# Patient Record
Sex: MALE | Race: WHITE | Employment: OTHER | URBAN - NONMETROPOLITAN AREA
[De-identification: names, ages, dates, MRNs, and addresses within clinical notes are randomized per-mention and may not be internally consistent; named-entity substitution may affect disease eponyms.]

---

## 2017-02-06 DIAGNOSIS — E78.5 HYPERLIPIDEMIA: Primary | ICD-10-CM

## 2017-02-06 RX ORDER — SIMVASTATIN 20 MG
TABLET ORAL
Qty: 30 TABLET | Refills: 0 | Status: SHIPPED | OUTPATIENT
Start: 2017-02-06 | End: 2017-02-09 | Stop reason: DRUGHIGH

## 2017-02-09 ENCOUNTER — OFFICE VISIT (OUTPATIENT)
Dept: CARDIOLOGY | Age: 72
End: 2017-02-09
Payer: MEDICARE

## 2017-02-09 VITALS
WEIGHT: 171 LBS | HEIGHT: 71 IN | DIASTOLIC BLOOD PRESSURE: 70 MMHG | SYSTOLIC BLOOD PRESSURE: 118 MMHG | BODY MASS INDEX: 23.94 KG/M2 | HEART RATE: 60 BPM

## 2017-02-09 DIAGNOSIS — Z95.810 AICD (AUTOMATIC CARDIOVERTER/DEFIBRILLATOR) PRESENT: ICD-10-CM

## 2017-02-09 DIAGNOSIS — I10 ESSENTIAL HYPERTENSION: ICD-10-CM

## 2017-02-09 DIAGNOSIS — I25.5 ISCHEMIC CARDIOMYOPATHY: Primary | ICD-10-CM

## 2017-02-09 DIAGNOSIS — I25.10 CORONARY ARTERY DISEASE INVOLVING NATIVE CORONARY ARTERY OF NATIVE HEART WITHOUT ANGINA PECTORIS: ICD-10-CM

## 2017-02-09 PROCEDURE — 3017F COLORECTAL CA SCREEN DOC REV: CPT | Performed by: CLINICAL NURSE SPECIALIST

## 2017-02-09 PROCEDURE — 93289 INTERROG DEVICE EVAL HEART: CPT | Performed by: CLINICAL NURSE SPECIALIST

## 2017-02-09 PROCEDURE — 1036F TOBACCO NON-USER: CPT | Performed by: CLINICAL NURSE SPECIALIST

## 2017-02-09 PROCEDURE — 99213 OFFICE O/P EST LOW 20 MIN: CPT | Performed by: CLINICAL NURSE SPECIALIST

## 2017-02-09 PROCEDURE — G8420 CALC BMI NORM PARAMETERS: HCPCS | Performed by: CLINICAL NURSE SPECIALIST

## 2017-02-09 PROCEDURE — 1123F ACP DISCUSS/DSCN MKR DOCD: CPT | Performed by: CLINICAL NURSE SPECIALIST

## 2017-02-09 PROCEDURE — G8484 FLU IMMUNIZE NO ADMIN: HCPCS | Performed by: CLINICAL NURSE SPECIALIST

## 2017-02-09 PROCEDURE — G8427 DOCREV CUR MEDS BY ELIG CLIN: HCPCS | Performed by: CLINICAL NURSE SPECIALIST

## 2017-02-09 PROCEDURE — G8598 ASA/ANTIPLAT THER USED: HCPCS | Performed by: CLINICAL NURSE SPECIALIST

## 2017-02-09 PROCEDURE — 4040F PNEUMOC VAC/ADMIN/RCVD: CPT | Performed by: CLINICAL NURSE SPECIALIST

## 2017-02-09 RX ORDER — SIMVASTATIN 10 MG
TABLET ORAL
COMMUNITY
Start: 2017-01-04 | End: 2018-02-09 | Stop reason: SDUPTHER

## 2017-05-11 ENCOUNTER — TELEPHONE (OUTPATIENT)
Dept: CARDIOLOGY | Age: 72
End: 2017-05-11

## 2017-05-11 DIAGNOSIS — I25.5 ISCHEMIC CARDIOMYOPATHY: ICD-10-CM

## 2017-05-11 DIAGNOSIS — Z95.810 AICD (AUTOMATIC CARDIOVERTER/DEFIBRILLATOR) PRESENT: Primary | ICD-10-CM

## 2017-05-11 PROCEDURE — 93296 REM INTERROG EVL PM/IDS: CPT | Performed by: CLINICAL NURSE SPECIALIST

## 2017-05-11 PROCEDURE — 93295 DEV INTERROG REMOTE 1/2/MLT: CPT | Performed by: CLINICAL NURSE SPECIALIST

## 2017-08-16 ENCOUNTER — OFFICE VISIT (OUTPATIENT)
Dept: CARDIOLOGY | Age: 72
End: 2017-08-16
Payer: MEDICARE

## 2017-08-16 VITALS
SYSTOLIC BLOOD PRESSURE: 118 MMHG | WEIGHT: 167 LBS | HEART RATE: 73 BPM | HEIGHT: 70 IN | BODY MASS INDEX: 23.91 KG/M2 | DIASTOLIC BLOOD PRESSURE: 70 MMHG

## 2017-08-16 DIAGNOSIS — I25.5 ISCHEMIC CARDIOMYOPATHY: ICD-10-CM

## 2017-08-16 DIAGNOSIS — Z95.810 AICD (AUTOMATIC CARDIOVERTER/DEFIBRILLATOR) PRESENT: ICD-10-CM

## 2017-08-16 PROCEDURE — 99214 OFFICE O/P EST MOD 30 MIN: CPT | Performed by: INTERNAL MEDICINE

## 2017-08-16 PROCEDURE — 3017F COLORECTAL CA SCREEN DOC REV: CPT | Performed by: INTERNAL MEDICINE

## 2017-08-16 PROCEDURE — 93282 PRGRMG EVAL IMPLANTABLE DFB: CPT | Performed by: INTERNAL MEDICINE

## 2017-08-16 PROCEDURE — G8420 CALC BMI NORM PARAMETERS: HCPCS | Performed by: INTERNAL MEDICINE

## 2017-08-16 PROCEDURE — 1123F ACP DISCUSS/DSCN MKR DOCD: CPT | Performed by: INTERNAL MEDICINE

## 2017-08-16 PROCEDURE — 1036F TOBACCO NON-USER: CPT | Performed by: INTERNAL MEDICINE

## 2017-08-16 PROCEDURE — G8427 DOCREV CUR MEDS BY ELIG CLIN: HCPCS | Performed by: INTERNAL MEDICINE

## 2017-08-16 PROCEDURE — G8598 ASA/ANTIPLAT THER USED: HCPCS | Performed by: INTERNAL MEDICINE

## 2017-08-16 PROCEDURE — 4040F PNEUMOC VAC/ADMIN/RCVD: CPT | Performed by: INTERNAL MEDICINE

## 2017-08-16 RX ORDER — CARVEDILOL 6.25 MG/1
3.12 TABLET ORAL 2 TIMES DAILY WITH MEALS
COMMUNITY
End: 2018-04-19 | Stop reason: SDUPTHER

## 2017-09-25 ENCOUNTER — OFFICE VISIT (OUTPATIENT)
Dept: GASTROENTEROLOGY | Facility: CLINIC | Age: 72
End: 2017-09-25

## 2017-09-25 VITALS
HEART RATE: 60 BPM | OXYGEN SATURATION: 98 % | TEMPERATURE: 96 F | BODY MASS INDEX: 23.38 KG/M2 | WEIGHT: 167 LBS | DIASTOLIC BLOOD PRESSURE: 62 MMHG | SYSTOLIC BLOOD PRESSURE: 106 MMHG | HEIGHT: 71 IN

## 2017-09-25 DIAGNOSIS — Z80.0 FAMILY HX OF COLON CANCER: ICD-10-CM

## 2017-09-25 DIAGNOSIS — R12 HEARTBURN: ICD-10-CM

## 2017-09-25 DIAGNOSIS — R13.14 PHARYNGOESOPHAGEAL DYSPHAGIA: ICD-10-CM

## 2017-09-25 DIAGNOSIS — Z86.010 HX OF COLONIC POLYP: Primary | ICD-10-CM

## 2017-09-25 PROBLEM — Z86.0100 HX OF COLONIC POLYP: Status: ACTIVE | Noted: 2017-09-25

## 2017-09-25 PROCEDURE — 99214 OFFICE O/P EST MOD 30 MIN: CPT | Performed by: NURSE PRACTITIONER

## 2017-09-25 RX ORDER — ASPIRIN 81 MG/1
81 TABLET ORAL DAILY
COMMUNITY

## 2017-09-25 RX ORDER — ISOSORBIDE MONONITRATE 30 MG/1
30 TABLET, EXTENDED RELEASE ORAL DAILY
COMMUNITY
Start: 2016-06-21

## 2017-09-25 RX ORDER — CARVEDILOL 3.12 MG/1
3.12 TABLET ORAL 2 TIMES DAILY WITH MEALS
COMMUNITY

## 2017-09-25 RX ORDER — NITROGLYCERIN 0.4 MG/1
0.4 TABLET SUBLINGUAL
COMMUNITY
Start: 2015-06-24

## 2017-09-25 RX ORDER — SIMVASTATIN 10 MG
10 TABLET ORAL NIGHTLY
COMMUNITY
Start: 2017-01-04 | End: 2020-12-17

## 2017-09-25 NOTE — PROGRESS NOTES
[facility]    Primary Physician Josiah Martines MD    9/25/2017    Zackery Molina   1945      Chief Complaint   Patient presents with   • Colonoscopy   Difficulty swallowing  Reflux    Subjective     HPI    Zackery Molina is a 72 y.o. male who presents as a referral for preventative maintenance. He has no complaints of nausea or vomiting. No change in bowels. No wt loss. No BRBPR. No melena. No abdominal pain.   His father has history of colon polyps in his 70s.  Patient also has personal history of colon polyps.  His last colonoscopy was June 2012 noting mild diverticulosis and was recommended repeat colonoscopy again in 5 years.    He also has had intermittent dysphagia over the last year.  He points to the nape of the neck where he feels like solids or liquids will hang.  Coughing will usually help when these situations occur.  He does have some heartburn at least 2 days a week and is not taking prescription medications.  He has tried Tums or Rolaids over-the-counter.  No history of upper endoscopy.  No hematemesis.  No nausea or vomiting.  No fevers or chills.  No unintentional weight loss.  No abdominal pain.    Past Medical History:   Diagnosis Date   • CHF (congestive heart failure)     dr crow    • Colon polyp    • Dysuria    • GERD (gastroesophageal reflux disease)    • Hyperlipidemia    • Myocardial infarction    • Shingles        Past Surgical History:   Procedure Laterality Date   • APPENDECTOMY     • CHOLECYSTECTOMY     • COLONOSCOPY     • CORONARY ARTERY BYPASS GRAFT     • ENDOSCOPY  04/06/2016   • HIP SURGERY Left        Outpatient Prescriptions Marked as Taking for the 9/25/17 encounter (Office Visit) with MOODY Reese   Medication Sig Dispense Refill   • aspirin 81 MG EC tablet Take  by mouth.     • carvedilol (COREG) 6.25 MG tablet Take 3.125 mg by mouth 2 (Two) Times a Day With Meals.     • isosorbide mononitrate (IMDUR) 30 MG 24 hr tablet Take  by  mouth.     • nitroglycerin (NITROSTAT) 0.4 MG SL tablet Place  under the tongue.     • simvastatin (ZOCOR) 10 MG tablet Every Night.         No Known Allergies    Social History     Social History   • Marital status:      Spouse name: N/A   • Number of children: N/A   • Years of education: N/A     Occupational History   • Not on file.     Social History Main Topics   • Smoking status: Former Smoker   • Smokeless tobacco: Never Used   • Alcohol use No   • Drug use: No   • Sexual activity: Not on file     Other Topics Concern   • Not on file     Social History Narrative       Family History   Problem Relation Age of Onset   • Colon cancer Father        Review of Systems   Constitutional: Negative for appetite change, chills, fatigue, fever and unexpected weight change.   HENT: Positive for trouble swallowing. Negative for sore throat.    Respiratory: Negative for cough, chest tightness, shortness of breath and wheezing.    Cardiovascular: Negative for chest pain and palpitations.   Gastrointestinal: Negative for abdominal distention, abdominal pain, anal bleeding, blood in stool, constipation, diarrhea, nausea, rectal pain and vomiting.        As mentioned in hpi   Genitourinary: Negative for difficulty urinating, dysuria and hematuria.   Musculoskeletal: Negative for arthralgias and back pain.   Skin: Negative for color change and rash.   Neurological: Negative for dizziness, syncope, light-headedness and headaches.   Psychiatric/Behavioral: Negative for confusion. The patient is not nervous/anxious.        Objective     Vitals:    09/25/17 0913   BP: 106/62   Pulse: 60   Temp: 96 °F (35.6 °C)   SpO2: 98%     Last 2 weights    09/25/17 0913   Weight: 167 lb (75.8 kg)     Body mass index is 23.62 kg/(m^2).    Physical Exam   Constitutional: He appears well-developed and well-nourished. No distress.   HENT:   Head: Normocephalic and atraumatic.   Eyes: EOM are normal. No scleral icterus.   Neck: Neck supple. No  JVD present.   Cardiovascular: Normal rate, regular rhythm and normal heart sounds.    Pulmonary/Chest: Effort normal and breath sounds normal. No stridor.   Abdominal: Soft. Bowel sounds are normal. He exhibits no distension and no mass. There is no tenderness. There is no rebound and no guarding.   Musculoskeletal: He exhibits no edema.   Neurological: He is alert.   Skin: Skin is warm and dry. No rash noted.   Psychiatric: He has a normal mood and affect. His behavior is normal.   Vitals reviewed.      Imaging Results (most recent)     None          Assessment/Plan     Zackery was seen today for colonoscopy.    Diagnoses and all orders for this visit:    Hx of colonic polyp  -     Case Request; Standing  -     Case Request    Family hx of colon cancer  -     Case Request; Standing  -     Case Request    Pharyngoesophageal dysphagia  -     Case Request; Standing  -     Case Request    Heartburn    Other orders  -     Implement Anesthesia Orders Day of Procedure; Standing  -     Obtain Informed Consent; Standing  -     polyethylene glycol (GOLYTELY) 236 g solution; Take 4,000 mL by mouth 1 (One) Time for 1 dose. Take as directed per instruction sheet.    Plan for colonoscopy.  In regards to difficulty swallowing, differential diagnosis discussed, recommend cut food in small pieces and chew well.  We discussed antireflux precautions and would recommend he follow those.  He declines trying any kind of medication for acid reflux.  He is agreeable to having upper endoscopy as well.    ESOPHAGOGASTRODUODENOSCOPY WITH ANESTHESIA (N/A), COLONOSCOPY WITH ANESTHESIA (N/A)  All risks, benefits, alternatives, and indications of colonoscopy procedure have been discussed with the patient. Risks to include perforation of the colon requiring possible surgery or colostomy, risk of bleeding from biopsies or removal of colon tissue, possibility of missing a colon polyp or cancer, or adverse drug reaction.  Benefits to include the  diagnosis and management of disease of the colon and rectum. Alternatives to include barium enema, radiographic evaluation, lab testing or no intervention. Pt verbalizes understanding and agrees.       Risk, benefits, and alternatives of endoscopy were explained in full.  They understand that there is a risk of bleeding, perforation, and infection.  The risk of perforation goes up with esophageal dilation.  Other options to evaluate UGI complaints could involve barium swallow or UGI series, but these would be diagnostic tests only.  Patient was given time to ask questions.  I answered them to their satisfaction and they are agreeable to proceeding      MOODY Aponte      EMR Dragon/transcription disclaimer:  Much of this encounter note is electronic transcription/translation of spoken language to printed text.  The electronic translation of spoken language may be erroneous, or at times, nonsensical words or phrases may be inadvertently transcribed.  Although I have reviewed the note for such errors, some may still exist.

## 2017-10-20 ENCOUNTER — HOSPITAL ENCOUNTER (OUTPATIENT)
Facility: HOSPITAL | Age: 72
Setting detail: HOSPITAL OUTPATIENT SURGERY
Discharge: HOME | End: 2017-10-20
Attending: INTERNAL MEDICINE | Admitting: INTERNAL MEDICINE

## 2017-10-20 ENCOUNTER — ANESTHESIA (OUTPATIENT)
Dept: GASTROENTEROLOGY | Facility: HOSPITAL | Age: 72
End: 2017-10-20

## 2017-10-20 ENCOUNTER — ANESTHESIA EVENT (OUTPATIENT)
Dept: GASTROENTEROLOGY | Facility: HOSPITAL | Age: 72
End: 2017-10-20

## 2017-10-20 VITALS
OXYGEN SATURATION: 95 % | BODY MASS INDEX: 23.19 KG/M2 | DIASTOLIC BLOOD PRESSURE: 68 MMHG | RESPIRATION RATE: 18 BRPM | SYSTOLIC BLOOD PRESSURE: 110 MMHG | WEIGHT: 162 LBS | HEIGHT: 70 IN | HEART RATE: 55 BPM | TEMPERATURE: 97.6 F

## 2017-10-20 DIAGNOSIS — Z86.010 HX OF COLONIC POLYP: ICD-10-CM

## 2017-10-20 DIAGNOSIS — Z80.0 FAMILY HX OF COLON CANCER: ICD-10-CM

## 2017-10-20 DIAGNOSIS — R13.14 PHARYNGOESOPHAGEAL DYSPHAGIA: ICD-10-CM

## 2017-10-20 PROCEDURE — 43239 EGD BIOPSY SINGLE/MULTIPLE: CPT | Performed by: INTERNAL MEDICINE

## 2017-10-20 PROCEDURE — 43248 EGD GUIDE WIRE INSERTION: CPT | Performed by: INTERNAL MEDICINE

## 2017-10-20 PROCEDURE — 45385 COLONOSCOPY W/LESION REMOVAL: CPT | Performed by: INTERNAL MEDICINE

## 2017-10-20 PROCEDURE — 88305 TISSUE EXAM BY PATHOLOGIST: CPT | Performed by: INTERNAL MEDICINE

## 2017-10-20 PROCEDURE — 25010000002 PROPOFOL 10 MG/ML EMULSION: Performed by: NURSE ANESTHETIST, CERTIFIED REGISTERED

## 2017-10-20 RX ORDER — SODIUM CHLORIDE 0.9 % (FLUSH) 0.9 %
3 SYRINGE (ML) INJECTION AS NEEDED
Status: DISCONTINUED | OUTPATIENT
Start: 2017-10-20 | End: 2017-10-20 | Stop reason: HOSPADM

## 2017-10-20 RX ORDER — ONDANSETRON 2 MG/ML
4 INJECTION INTRAMUSCULAR; INTRAVENOUS ONCE AS NEEDED
Status: DISCONTINUED | OUTPATIENT
Start: 2017-10-20 | End: 2017-10-20 | Stop reason: HOSPADM

## 2017-10-20 RX ORDER — SODIUM CHLORIDE 9 MG/ML
500 INJECTION, SOLUTION INTRAVENOUS CONTINUOUS PRN
Status: DISCONTINUED | OUTPATIENT
Start: 2017-10-20 | End: 2017-10-20 | Stop reason: HOSPADM

## 2017-10-20 RX ORDER — LIDOCAINE HYDROCHLORIDE 20 MG/ML
INJECTION, SOLUTION INFILTRATION; PERINEURAL AS NEEDED
Status: DISCONTINUED | OUTPATIENT
Start: 2017-10-20 | End: 2017-10-20 | Stop reason: SURG

## 2017-10-20 RX ORDER — PROPOFOL 10 MG/ML
VIAL (ML) INTRAVENOUS AS NEEDED
Status: DISCONTINUED | OUTPATIENT
Start: 2017-10-20 | End: 2017-10-20 | Stop reason: SURG

## 2017-10-20 RX ADMIN — SODIUM CHLORIDE 500 ML: 0.9 INJECTION, SOLUTION INTRAVENOUS at 11:42

## 2017-10-20 RX ADMIN — PROPOFOL 100 MG: 10 INJECTION, EMULSION INTRAVENOUS at 13:05

## 2017-10-20 RX ADMIN — PROPOFOL 50 MG: 10 INJECTION, EMULSION INTRAVENOUS at 12:54

## 2017-10-20 RX ADMIN — PROPOFOL 50 MG: 10 INJECTION, EMULSION INTRAVENOUS at 12:58

## 2017-10-20 RX ADMIN — LIDOCAINE HYDROCHLORIDE 50 MG: 20 INJECTION, SOLUTION INFILTRATION; PERINEURAL at 12:54

## 2017-10-20 RX ADMIN — PROPOFOL 50 MG: 10 INJECTION, EMULSION INTRAVENOUS at 12:55

## 2017-10-20 NOTE — PLAN OF CARE
Problem: GI Endoscopy (Adult)  Goal: Signs and Symptoms of Listed Potential Problems Will be Absent or Manageable (GI Endoscopy)  Outcome: Outcome(s) achieved Date Met:  10/20/17

## 2017-10-20 NOTE — ANESTHESIA POSTPROCEDURE EVALUATION
"Patient: Zackery Molina    Procedure Summary     Date Anesthesia Start Anesthesia Stop Room / Location    10/20/17 1251 1325  [facility] / [facility]       Procedure Diagnosis Surgeon Provider    ESOPHAGOGASTRODUODENOSCOPY WITH ANESTHESIA (N/A Esophagus); COLONOSCOPY WITH ANESTHESIA (N/A ) Pharyngoesophageal dysphagia; Family hx of colon cancer; Hx of colonic polyp  (Pharyngoesophageal dysphagia [R13.14]; Family hx of colon cancer [Z80.0]; Hx of colonic polyp [Z86.010]) MD Christy Tejeda CRNA          Anesthesia Type: general  Last vitals  BP   110/68 (10/20/17 1125)   Temp   97.6 °F (36.4 °C) (10/20/17 1125)   Pulse   55 (10/20/17 1125)   Resp   18 (10/20/17 1125)     SpO2   95 % (10/20/17 1125)     Post Anesthesia Care and Evaluation    Patient location during evaluation: bedside  Patient participation: complete - patient participated  Level of consciousness: awake and awake and alert  Pain score: 0  Pain management: adequate  Airway patency: patent  Anesthetic complications: No anesthetic complications  PONV Status: none  Cardiovascular status: acceptable  Respiratory status: acceptable  Hydration status: acceptable    Comments: Patient discharged according to acceptable Jv score per RN assessment. See nursing records for further information.     Blood pressure 110/68, pulse 55, temperature 97.6 °F (36.4 °C), temperature source Temporal Artery , resp. rate 18, height 70\" (177.8 cm), weight 162 lb (73.5 kg), SpO2 95 %.        "

## 2017-10-20 NOTE — ANESTHESIA PREPROCEDURE EVALUATION
Anesthesia Evaluation     Patient summary reviewed   no history of anesthetic complications:  NPO Solid Status: > 8 hours  NPO Liquid Status: > 8 hours     Airway   Mallampati: I  TM distance: >3 FB  Neck ROM: full  no difficulty expected  Dental - normal exam     Pulmonary    (-) COPD, asthma, sleep apnea, not a smoker  Cardiovascular     Patient on routine beta blocker and Beta blocker given within 24 hours of surgery    (+) pacemaker, past MI , CAD, CABG (6 years ago, 4V), CHF,   (-) angina      Neuro/Psych  (-) seizures, TIA, CVA  GI/Hepatic/Renal/Endo    (+)  GERD,   (-) liver disease, no renal disease, diabetes    Musculoskeletal     Abdominal    Substance History      OB/GYN          Other                                        Anesthesia Plan    ASA 3     general   total IV anesthesia  intravenous induction   Anesthetic plan and risks discussed with patient.

## 2017-10-20 NOTE — PLAN OF CARE
Problem: Patient Care Overview (Adult)  Goal: Plan of Care Review  Outcome: Outcome(s) achieved Date Met:  10/20/17    10/20/17 4545   Coping/Psychosocial Response Interventions   Plan Of Care Reviewed With patient;spouse   Patient Care Overview   Progress improving   Outcome Evaluation   Outcome Summary/Follow up Plan discharge criteria met

## 2017-10-20 NOTE — H&P (VIEW-ONLY)
[facility]    Primary Physician Josiah Martines MD    9/25/2017    Zackery Molina   1945      Chief Complaint   Patient presents with   • Colonoscopy   Difficulty swallowing  Reflux    Subjective     HPI    Zackery Molina is a 72 y.o. male who presents as a referral for preventative maintenance. He has no complaints of nausea or vomiting. No change in bowels. No wt loss. No BRBPR. No melena. No abdominal pain.   His father has history of colon polyps in his 70s.  Patient also has personal history of colon polyps.  His last colonoscopy was June 2012 noting mild diverticulosis and was recommended repeat colonoscopy again in 5 years.    He also has had intermittent dysphagia over the last year.  He points to the nape of the neck where he feels like solids or liquids will hang.  Coughing will usually help when these situations occur.  He does have some heartburn at least 2 days a week and is not taking prescription medications.  He has tried Tums or Rolaids over-the-counter.  No history of upper endoscopy.  No hematemesis.  No nausea or vomiting.  No fevers or chills.  No unintentional weight loss.  No abdominal pain.    Past Medical History:   Diagnosis Date   • CHF (congestive heart failure)     dr crow    • Colon polyp    • Dysuria    • GERD (gastroesophageal reflux disease)    • Hyperlipidemia    • Myocardial infarction    • Shingles        Past Surgical History:   Procedure Laterality Date   • APPENDECTOMY     • CHOLECYSTECTOMY     • COLONOSCOPY     • CORONARY ARTERY BYPASS GRAFT     • ENDOSCOPY  04/06/2016   • HIP SURGERY Left        Outpatient Prescriptions Marked as Taking for the 9/25/17 encounter (Office Visit) with MOODY Reese   Medication Sig Dispense Refill   • aspirin 81 MG EC tablet Take  by mouth.     • carvedilol (COREG) 6.25 MG tablet Take 3.125 mg by mouth 2 (Two) Times a Day With Meals.     • isosorbide mononitrate (IMDUR) 30 MG 24 hr tablet Take  by  mouth.     • nitroglycerin (NITROSTAT) 0.4 MG SL tablet Place  under the tongue.     • simvastatin (ZOCOR) 10 MG tablet Every Night.         No Known Allergies    Social History     Social History   • Marital status:      Spouse name: N/A   • Number of children: N/A   • Years of education: N/A     Occupational History   • Not on file.     Social History Main Topics   • Smoking status: Former Smoker   • Smokeless tobacco: Never Used   • Alcohol use No   • Drug use: No   • Sexual activity: Not on file     Other Topics Concern   • Not on file     Social History Narrative       Family History   Problem Relation Age of Onset   • Colon cancer Father        Review of Systems   Constitutional: Negative for appetite change, chills, fatigue, fever and unexpected weight change.   HENT: Positive for trouble swallowing. Negative for sore throat.    Respiratory: Negative for cough, chest tightness, shortness of breath and wheezing.    Cardiovascular: Negative for chest pain and palpitations.   Gastrointestinal: Negative for abdominal distention, abdominal pain, anal bleeding, blood in stool, constipation, diarrhea, nausea, rectal pain and vomiting.        As mentioned in hpi   Genitourinary: Negative for difficulty urinating, dysuria and hematuria.   Musculoskeletal: Negative for arthralgias and back pain.   Skin: Negative for color change and rash.   Neurological: Negative for dizziness, syncope, light-headedness and headaches.   Psychiatric/Behavioral: Negative for confusion. The patient is not nervous/anxious.        Objective     Vitals:    09/25/17 0913   BP: 106/62   Pulse: 60   Temp: 96 °F (35.6 °C)   SpO2: 98%     Last 2 weights    09/25/17 0913   Weight: 167 lb (75.8 kg)     Body mass index is 23.62 kg/(m^2).    Physical Exam   Constitutional: He appears well-developed and well-nourished. No distress.   HENT:   Head: Normocephalic and atraumatic.   Eyes: EOM are normal. No scleral icterus.   Neck: Neck supple. No  JVD present.   Cardiovascular: Normal rate, regular rhythm and normal heart sounds.    Pulmonary/Chest: Effort normal and breath sounds normal. No stridor.   Abdominal: Soft. Bowel sounds are normal. He exhibits no distension and no mass. There is no tenderness. There is no rebound and no guarding.   Musculoskeletal: He exhibits no edema.   Neurological: He is alert.   Skin: Skin is warm and dry. No rash noted.   Psychiatric: He has a normal mood and affect. His behavior is normal.   Vitals reviewed.      Imaging Results (most recent)     None          Assessment/Plan     Zackery was seen today for colonoscopy.    Diagnoses and all orders for this visit:    Hx of colonic polyp  -     Case Request; Standing  -     Case Request    Family hx of colon cancer  -     Case Request; Standing  -     Case Request    Pharyngoesophageal dysphagia  -     Case Request; Standing  -     Case Request    Heartburn    Other orders  -     Implement Anesthesia Orders Day of Procedure; Standing  -     Obtain Informed Consent; Standing  -     polyethylene glycol (GOLYTELY) 236 g solution; Take 4,000 mL by mouth 1 (One) Time for 1 dose. Take as directed per instruction sheet.    Plan for colonoscopy.  In regards to difficulty swallowing, differential diagnosis discussed, recommend cut food in small pieces and chew well.  We discussed antireflux precautions and would recommend he follow those.  He declines trying any kind of medication for acid reflux.  He is agreeable to having upper endoscopy as well.    ESOPHAGOGASTRODUODENOSCOPY WITH ANESTHESIA (N/A), COLONOSCOPY WITH ANESTHESIA (N/A)  All risks, benefits, alternatives, and indications of colonoscopy procedure have been discussed with the patient. Risks to include perforation of the colon requiring possible surgery or colostomy, risk of bleeding from biopsies or removal of colon tissue, possibility of missing a colon polyp or cancer, or adverse drug reaction.  Benefits to include the  diagnosis and management of disease of the colon and rectum. Alternatives to include barium enema, radiographic evaluation, lab testing or no intervention. Pt verbalizes understanding and agrees.       Risk, benefits, and alternatives of endoscopy were explained in full.  They understand that there is a risk of bleeding, perforation, and infection.  The risk of perforation goes up with esophageal dilation.  Other options to evaluate UGI complaints could involve barium swallow or UGI series, but these would be diagnostic tests only.  Patient was given time to ask questions.  I answered them to their satisfaction and they are agreeable to proceeding      MOODY Aponte      EMR Dragon/transcription disclaimer:  Much of this encounter note is electronic transcription/translation of spoken language to printed text.  The electronic translation of spoken language may be erroneous, or at times, nonsensical words or phrases may be inadvertently transcribed.  Although I have reviewed the note for such errors, some may still exist.

## 2017-10-23 LAB
CYTO UR: NORMAL
LAB AP CASE REPORT: NORMAL
LAB AP CLINICAL INFORMATION: NORMAL
Lab: NORMAL
PATH REPORT.FINAL DX SPEC: NORMAL
PATH REPORT.GROSS SPEC: NORMAL

## 2017-11-16 DIAGNOSIS — I25.5 ISCHEMIC CARDIOMYOPATHY: ICD-10-CM

## 2017-11-16 DIAGNOSIS — Z95.810 AICD (AUTOMATIC CARDIOVERTER/DEFIBRILLATOR) PRESENT: Primary | ICD-10-CM

## 2017-11-16 PROCEDURE — 93295 DEV INTERROG REMOTE 1/2/MLT: CPT | Performed by: CLINICAL NURSE SPECIALIST

## 2017-11-16 PROCEDURE — 93296 REM INTERROG EVL PM/IDS: CPT | Performed by: CLINICAL NURSE SPECIALIST

## 2018-02-09 RX ORDER — SIMVASTATIN 10 MG
10 TABLET ORAL NIGHTLY
Qty: 90 TABLET | Refills: 0 | Status: SHIPPED | OUTPATIENT
Start: 2018-02-09 | End: 2018-04-16 | Stop reason: SDUPTHER

## 2018-02-21 ENCOUNTER — OFFICE VISIT (OUTPATIENT)
Dept: CARDIOLOGY | Age: 73
End: 2018-02-21
Payer: MEDICARE

## 2018-02-21 VITALS
HEIGHT: 70 IN | SYSTOLIC BLOOD PRESSURE: 132 MMHG | DIASTOLIC BLOOD PRESSURE: 64 MMHG | HEART RATE: 70 BPM | BODY MASS INDEX: 24.48 KG/M2 | WEIGHT: 171 LBS

## 2018-02-21 DIAGNOSIS — I10 ESSENTIAL HYPERTENSION: ICD-10-CM

## 2018-02-21 DIAGNOSIS — Z95.810 AICD (AUTOMATIC CARDIOVERTER/DEFIBRILLATOR) PRESENT: ICD-10-CM

## 2018-02-21 DIAGNOSIS — I25.5 ISCHEMIC CARDIOMYOPATHY: Primary | ICD-10-CM

## 2018-02-21 DIAGNOSIS — I25.10 CORONARY ARTERY DISEASE INVOLVING NATIVE CORONARY ARTERY OF NATIVE HEART WITHOUT ANGINA PECTORIS: ICD-10-CM

## 2018-02-21 PROCEDURE — 4040F PNEUMOC VAC/ADMIN/RCVD: CPT | Performed by: CLINICAL NURSE SPECIALIST

## 2018-02-21 PROCEDURE — 1123F ACP DISCUSS/DSCN MKR DOCD: CPT | Performed by: CLINICAL NURSE SPECIALIST

## 2018-02-21 PROCEDURE — 99213 OFFICE O/P EST LOW 20 MIN: CPT | Performed by: CLINICAL NURSE SPECIALIST

## 2018-02-21 PROCEDURE — 1036F TOBACCO NON-USER: CPT | Performed by: CLINICAL NURSE SPECIALIST

## 2018-02-21 PROCEDURE — G8484 FLU IMMUNIZE NO ADMIN: HCPCS | Performed by: CLINICAL NURSE SPECIALIST

## 2018-02-21 PROCEDURE — 93289 INTERROG DEVICE EVAL HEART: CPT | Performed by: CLINICAL NURSE SPECIALIST

## 2018-02-21 PROCEDURE — G8598 ASA/ANTIPLAT THER USED: HCPCS | Performed by: CLINICAL NURSE SPECIALIST

## 2018-02-21 PROCEDURE — 3017F COLORECTAL CA SCREEN DOC REV: CPT | Performed by: CLINICAL NURSE SPECIALIST

## 2018-02-21 PROCEDURE — G8427 DOCREV CUR MEDS BY ELIG CLIN: HCPCS | Performed by: CLINICAL NURSE SPECIALIST

## 2018-02-21 PROCEDURE — G8420 CALC BMI NORM PARAMETERS: HCPCS | Performed by: CLINICAL NURSE SPECIALIST

## 2018-02-21 RX ORDER — ISOSORBIDE MONONITRATE 30 MG/1
30 TABLET, EXTENDED RELEASE ORAL DAILY
Qty: 90 TABLET | Refills: 3 | Status: SHIPPED | OUTPATIENT
Start: 2018-02-21 | End: 2019-03-11 | Stop reason: SDUPTHER

## 2018-02-21 NOTE — PATIENT INSTRUCTIONS
[facility] will send automatically in 3 months  Follow up in 6 mos With Dr. Cooper Anis  Call with any questions or concerns  Follow up with Josseline Means MD for non cardiac problems  Report any new problems  Cardiovascular Fitness-Exercise as tolerated. Strive for 30 minutes of exercise most days of the week. Cardiac / Healthy Diet  Continue current medications as directed  Continue plan of treatment  It is always recommended that you bring your medications bottles with you to each visit - this is for your safety!

## 2018-04-17 RX ORDER — SIMVASTATIN 10 MG
TABLET ORAL
Qty: 90 TABLET | Refills: 3 | Status: SHIPPED | OUTPATIENT
Start: 2018-04-17 | End: 2019-06-10 | Stop reason: SDUPTHER

## 2018-04-19 RX ORDER — CARVEDILOL 6.25 MG/1
TABLET ORAL
Qty: 180 TABLET | Refills: 3 | Status: SHIPPED | OUTPATIENT
Start: 2018-04-19 | End: 2018-08-29 | Stop reason: DRUGHIGH

## 2018-05-23 DIAGNOSIS — I25.5 ISCHEMIC CARDIOMYOPATHY: ICD-10-CM

## 2018-05-23 DIAGNOSIS — Z95.810 AICD (AUTOMATIC CARDIOVERTER/DEFIBRILLATOR) PRESENT: Primary | ICD-10-CM

## 2018-05-23 PROCEDURE — 93296 REM INTERROG EVL PM/IDS: CPT | Performed by: CLINICAL NURSE SPECIALIST

## 2018-05-23 PROCEDURE — 93295 DEV INTERROG REMOTE 1/2/MLT: CPT | Performed by: CLINICAL NURSE SPECIALIST

## 2018-05-24 NOTE — PROGRESS NOTES
Subjective    Mr. Molina is 72 y.o. male    Chief Complaint: Elevated PSA    History of Present Illness     Elevated PSA  Patient is here with an elevated PSA. The PSA was drawn1 week(s). He does not have a family history of prostate cancer. His AUA Symptom Score is 2 /35. Voiding symptoms include Urgency. Denies Incomplete emptying, Frequency, Intermittency and Weakened stream. Voiding symptoms began several years  . These have been gradual in onset. none  Previous PSA values are :   No results found for: PSA         PSA 01/2016 3.0   PSA 03/16/2018 5.9 (H)   PSA 04/27/2018 4.9 (H)     Most recent PSA on 04/27/2018 is 4.9.    The following portions of the patient's history were reviewed and updated as appropriate: allergies, current medications, past family history, past medical history, past social history, past surgical history and problem list.    Review of Systems   Constitutional: Negative for appetite change, chills, fever and unexpected weight change.   HENT: Negative for congestion, ear pain, facial swelling, hearing loss, nosebleeds, trouble swallowing and voice change.    Eyes: Negative for photophobia, pain, discharge and visual disturbance.   Respiratory: Negative for cough, choking, chest tightness and shortness of breath.    Cardiovascular: Negative for chest pain and palpitations.   Gastrointestinal: Negative for abdominal distention, abdominal pain, blood in stool, constipation, diarrhea, nausea and vomiting.   Endocrine: Negative for cold intolerance, heat intolerance and polydipsia.   Genitourinary: Positive for urgency. Negative for discharge, dysuria, flank pain, frequency, genital sores, hematuria, scrotal swelling and testicular pain.   Musculoskeletal: Negative for arthralgias, joint swelling, neck pain and neck stiffness.   Skin: Negative for pallor and rash.   Allergic/Immunologic: Negative for immunocompromised state.   Neurological: Negative for dizziness, tremors, seizures, syncope,  light-headedness and headaches.   Hematological: Negative for adenopathy. Does not bruise/bleed easily.   Psychiatric/Behavioral: Negative for agitation, confusion, dysphoric mood, hallucinations, self-injury and suicidal ideas.         Current Outpatient Prescriptions:   •  aspirin 81 MG EC tablet, Take  by mouth., Disp: , Rfl:   •  carvedilol (COREG) 6.25 MG tablet, Take 3.125 mg by mouth 2 (Two) Times a Day With Meals., Disp: , Rfl:   •  isosorbide mononitrate (IMDUR) 30 MG 24 hr tablet, Take  by mouth., Disp: , Rfl:   •  nitroglycerin (NITROSTAT) 0.4 MG SL tablet, Place  under the tongue., Disp: , Rfl:   •  simvastatin (ZOCOR) 10 MG tablet, Every Night., Disp: , Rfl:     Past Medical History:   Diagnosis Date   • CHF (congestive heart failure)     dr crow    • Colon polyp    • Coronary artery disease    • Dysuria    • GERD (gastroesophageal reflux disease)    • Hyperlipidemia    • Myocardial infarction    • Shingles        Past Surgical History:   Procedure Laterality Date   • APPENDECTOMY     • CARDIAC DEFIBRILLATOR PLACEMENT     • CHOLECYSTECTOMY     • COLONOSCOPY     • COLONOSCOPY N/A 10/20/2017    Procedure: COLONOSCOPY WITH ANESTHESIA;  Surgeon: Donny Mock MD;  Location: [facility] ENDOSCOPY;  Service:    • CORONARY ARTERY BYPASS GRAFT     • ENDOSCOPY  04/06/2016   • ENDOSCOPY N/A 10/20/2017    Procedure: ESOPHAGOGASTRODUODENOSCOPY WITH ANESTHESIA;  Surgeon: Donny Mock MD;  Location: [facility] ENDOSCOPY;  Service:    • HIP SURGERY Left    • PACEMAKER IMPLANTATION      defibrillator also medtronic       Social History     Social History   • Marital status:      Social History Main Topics   • Smoking status: Former Smoker     Quit date: 10/20/1978   • Smokeless tobacco: Never Used   • Alcohol use No   • Drug use: No     Other Topics Concern   • Not on file       Family History   Problem Relation Age of Onset   • Colon cancer Father        Objective    /66   Temp 98.4 °F (36.9 °C)    "Ht 177.8 cm (70\")   Wt 77.9 kg (171 lb 12.8 oz)   BMI 24.65 kg/m²     Physical Exam   Constitutional: He is oriented to person, place, and time. He appears well-developed and well-nourished. No distress.   HENT:   Nose: Nose normal.   Neck: Normal range of motion. Neck supple. No tracheal deviation present. No thyromegaly present.   Cardiovascular: Normal rate, regular rhythm and intact distal pulses.    No significant edema or tenderness    Pulmonary/Chest: Breath sounds normal. No accessory muscle usage. No respiratory distress.   Abdominal: Soft. Bowel sounds are normal. He exhibits no distension, no ascites and no mass. There is no hepatosplenomegaly. There is no tenderness. There is no rebound, no guarding and no CVA tenderness. No hernia.   Stool specimen is not indicated for my portion of the exam   Genitourinary: Testes normal and penis normal. Rectal exam shows no mass, no tenderness, anal tone normal and guaiac negative stool. Tender: no nodules. Right testis shows no mass, no swelling and no tenderness. Left testis shows no mass, no swelling and no tenderness. No penile tenderness (no lesion or deformities).   Genitourinary Comments:  The urethral meatus normal in position without evidence of stricture. Epididymis without mass or tenderness. Vas Deferens is palpably normal.Anus and perineum without mass or tenderness. The seminal vesicle are without mass or enlargement. The prostate is approximately 35 ml. It is Symmetric, with a Soft consistency. There are no nodules present. . The seminal vesicles are Not palpable due to the size of the prostate.     Lymphadenopathy:     He has no cervical adenopathy. No inguinal adenopathy noted on the right or left side.        Right: No inguinal adenopathy present.        Left: No inguinal adenopathy present.   Neurological: He is alert and oriented to person, place, and time.   Skin: Skin is warm and dry. No rash noted. He is not diaphoretic. No pallor. "   Psychiatric: He has a normal mood and affect. His behavior is normal.   Vitals reviewed.          Results for orders placed or performed in visit on 05/25/18   POC Urinalysis Dipstick, Automated   Result Value Ref Range    Color Yellow Yellow, Straw, Dark Yellow, [place]    Clarity, UA Clear Clear    Glucose, UA Negative Negative, 1000 mg/dL (3+) mg/dL    Bilirubin Negative Negative    Ketones, UA Negative Negative    Specific Gravity  1.020 1.005 - 1.030    Blood, UA Trace (A) Negative    pH, Urine 5.5 5.0 - 8.0    Protein, POC Negative Negative mg/dL    Urobilinogen, UA Normal Normal    Leukocytes Negative Negative    Nitrite, UA Negative Negative     Assessment and Plan    Zackery was seen today for elevated psa.    Diagnoses and all orders for this visit:    Elevated prostate specific antigen (PSA)  -     POC Urinalysis Dipstick, Automated    BPH with urinary obstruction    Regarding his PSA,  I have recommended a transrectal ultrasound guided prostate biopsy.  We discussed risks including hematuria,  hematochezia, hematospermia.  I discussed the 4% risk of infection requiring hospitalization.  Also discussed 1% risk of urinary retention.  I discussed my antimicrobial regimen which includes 3 days of antibiotics prior plus shot of IM gentamicin  The day of the procedure.  The patient voiced understanding of this and wishes to proceed.

## 2018-05-25 ENCOUNTER — OFFICE VISIT (OUTPATIENT)
Dept: UROLOGY | Facility: CLINIC | Age: 73
End: 2018-05-25

## 2018-05-25 VITALS
DIASTOLIC BLOOD PRESSURE: 66 MMHG | BODY MASS INDEX: 24.6 KG/M2 | HEIGHT: 70 IN | TEMPERATURE: 98.4 F | WEIGHT: 171.8 LBS | SYSTOLIC BLOOD PRESSURE: 114 MMHG

## 2018-05-25 DIAGNOSIS — N40.1 BPH WITH URINARY OBSTRUCTION: ICD-10-CM

## 2018-05-25 DIAGNOSIS — R97.20 ELEVATED PROSTATE SPECIFIC ANTIGEN (PSA): Primary | ICD-10-CM

## 2018-05-25 DIAGNOSIS — N13.8 BPH WITH URINARY OBSTRUCTION: ICD-10-CM

## 2018-05-25 LAB
BILIRUB BLD-MCNC: NEGATIVE MG/DL
CLARITY, POC: CLEAR
COLOR UR: YELLOW
GLUCOSE UR STRIP-MCNC: NEGATIVE MG/DL
KETONES UR QL: NEGATIVE
LEUKOCYTE EST, POC: NEGATIVE
NITRITE UR-MCNC: NEGATIVE MG/ML
PH UR: 5.5 [PH] (ref 5–8)
PROT UR STRIP-MCNC: NEGATIVE MG/DL
RBC # UR STRIP: ABNORMAL /UL
SP GR UR: 1.02 (ref 1–1.03)
UROBILINOGEN UR QL: NORMAL

## 2018-05-25 PROCEDURE — 99204 OFFICE O/P NEW MOD 45 MIN: CPT | Performed by: UROLOGY

## 2018-05-25 PROCEDURE — 81001 URINALYSIS AUTO W/SCOPE: CPT | Performed by: UROLOGY

## 2018-05-25 RX ORDER — CEPHALEXIN 500 MG/1
500 CAPSULE ORAL 3 TIMES DAILY
Qty: 9 CAPSULE | Refills: 0 | Status: SHIPPED | OUTPATIENT
Start: 2018-05-25 | End: 2018-05-28

## 2018-06-21 ENCOUNTER — PROCEDURE VISIT (OUTPATIENT)
Dept: UROLOGY | Facility: CLINIC | Age: 73
End: 2018-06-21

## 2018-06-21 DIAGNOSIS — R97.20 ELEVATED PROSTATE SPECIFIC ANTIGEN (PSA): Primary | ICD-10-CM

## 2018-06-21 LAB
BILIRUB BLD-MCNC: NEGATIVE MG/DL
CLARITY, POC: CLEAR
COLOR UR: YELLOW
GLUCOSE UR STRIP-MCNC: NEGATIVE MG/DL
KETONES UR QL: NEGATIVE
LEUKOCYTE EST, POC: NEGATIVE
NITRITE UR-MCNC: NEGATIVE MG/ML
PH UR: 5.5 [PH] (ref 5–8)
PROT UR STRIP-MCNC: NEGATIVE MG/DL
RBC # UR STRIP: ABNORMAL /UL
SP GR UR: 1.01 (ref 1–1.03)
UROBILINOGEN UR QL: NORMAL

## 2018-06-21 PROCEDURE — 55700 PR PROSTATE NEEDLE BIOPSY ANY APPROACH: CPT | Performed by: UROLOGY

## 2018-06-21 PROCEDURE — 76872 US TRANSRECTAL: CPT | Performed by: UROLOGY

## 2018-06-21 PROCEDURE — 96372 THER/PROPH/DIAG INJ SC/IM: CPT | Performed by: UROLOGY

## 2018-06-21 PROCEDURE — 81002 URINALYSIS NONAUTO W/O SCOPE: CPT | Performed by: UROLOGY

## 2018-06-21 PROCEDURE — G0416 PROSTATE BIOPSY, ANY MTHD: HCPCS | Performed by: UROLOGY

## 2018-06-21 PROCEDURE — 76942 ECHO GUIDE FOR BIOPSY: CPT | Performed by: UROLOGY

## 2018-06-21 RX ORDER — GENTAMICIN SULFATE 40 MG/ML
80 INJECTION, SOLUTION INTRAMUSCULAR; INTRAVENOUS ONCE
Status: COMPLETED | OUTPATIENT
Start: 2018-06-21 | End: 2018-06-21

## 2018-06-21 RX ADMIN — GENTAMICIN SULFATE 80 MG: 40 INJECTION, SOLUTION INTRAMUSCULAR; INTRAVENOUS at 11:35

## 2018-06-21 NOTE — PROGRESS NOTES
Indications:  Elevated PSA    Pre-operative prep:  fleets enema, oral antibiotic, IM gentamicin and stopped aspirin, coumadin, and other anticoagulants    Last PSA:  4.9    Procedure:    After proper identification of patient and procedure, patient was placed in the left later decubitus position.  2% lidocaine jelly was instilled per rectum  for topical anesthesia.  The ultrasound probe was gently inserted per rectum. Prostate was scanned from the base of the bladder to the apex.  20 cc of 1% lidocaine plain was then used to perform a prostate nerve block injecting the junction of the seminal vesicle and bladder laterally.      Prostate length: 4.57 cm    Prostate width: 4.57 cm    Prostate height: 2.96 cm    Prostate volume: 32.4 cc    PSA density: 0.15    Abnormal findings:  Hypoechoic lesions right mid    Median lobe:  no    A total of 12 biopsies were taken from the base, apex, and mid portion of the gland on both the right and the left sides.     Patient tolerated the procedure well    Complications: none    Estimated blood loss: minimal    Mr. Molina was given instructions for follow up.  He will notify the office if he has excessive hematuria, hematochezia, fevers, perineal, or abdominal pain.    Follow up:   Call with pathology results and schedule follow up.

## 2018-06-24 LAB
CYTO UR: NORMAL
LAB AP CASE REPORT: NORMAL
PATH REPORT.FINAL DX SPEC: NORMAL
PATH REPORT.GROSS SPEC: NORMAL

## 2018-06-25 DIAGNOSIS — R97.20 ELEVATED PROSTATE SPECIFIC ANTIGEN (PSA): Primary | ICD-10-CM

## 2018-08-29 ENCOUNTER — OFFICE VISIT (OUTPATIENT)
Dept: CARDIOLOGY | Age: 73
End: 2018-08-29
Payer: MEDICARE

## 2018-08-29 VITALS
SYSTOLIC BLOOD PRESSURE: 120 MMHG | DIASTOLIC BLOOD PRESSURE: 74 MMHG | WEIGHT: 168 LBS | BODY MASS INDEX: 24.05 KG/M2 | HEART RATE: 62 BPM | HEIGHT: 70 IN

## 2018-08-29 DIAGNOSIS — I25.10 CORONARY ARTERY DISEASE INVOLVING NATIVE CORONARY ARTERY OF NATIVE HEART WITHOUT ANGINA PECTORIS: Primary | ICD-10-CM

## 2018-08-29 DIAGNOSIS — I25.5 ISCHEMIC CARDIOMYOPATHY: ICD-10-CM

## 2018-08-29 DIAGNOSIS — I10 ESSENTIAL HYPERTENSION: ICD-10-CM

## 2018-08-29 DIAGNOSIS — Z95.810 AICD (AUTOMATIC CARDIOVERTER/DEFIBRILLATOR) PRESENT: ICD-10-CM

## 2018-08-29 PROCEDURE — 1123F ACP DISCUSS/DSCN MKR DOCD: CPT | Performed by: CLINICAL NURSE SPECIALIST

## 2018-08-29 PROCEDURE — 1036F TOBACCO NON-USER: CPT | Performed by: CLINICAL NURSE SPECIALIST

## 2018-08-29 PROCEDURE — 4040F PNEUMOC VAC/ADMIN/RCVD: CPT | Performed by: CLINICAL NURSE SPECIALIST

## 2018-08-29 PROCEDURE — 93289 INTERROG DEVICE EVAL HEART: CPT | Performed by: CLINICAL NURSE SPECIALIST

## 2018-08-29 PROCEDURE — 3017F COLORECTAL CA SCREEN DOC REV: CPT | Performed by: CLINICAL NURSE SPECIALIST

## 2018-08-29 PROCEDURE — G8598 ASA/ANTIPLAT THER USED: HCPCS | Performed by: CLINICAL NURSE SPECIALIST

## 2018-08-29 PROCEDURE — 99213 OFFICE O/P EST LOW 20 MIN: CPT | Performed by: CLINICAL NURSE SPECIALIST

## 2018-08-29 PROCEDURE — G8420 CALC BMI NORM PARAMETERS: HCPCS | Performed by: CLINICAL NURSE SPECIALIST

## 2018-08-29 PROCEDURE — G8427 DOCREV CUR MEDS BY ELIG CLIN: HCPCS | Performed by: CLINICAL NURSE SPECIALIST

## 2018-08-29 PROCEDURE — 1101F PT FALLS ASSESS-DOCD LE1/YR: CPT | Performed by: CLINICAL NURSE SPECIALIST

## 2018-08-29 RX ORDER — CARVEDILOL 6.25 MG/1
TABLET ORAL
COMMUNITY
End: 2019-06-10 | Stop reason: SDUPTHER

## 2018-08-29 NOTE — PATIENT INSTRUCTIONS
Take your diuretic with you to [place] in case of swelling  Follow up in 6 mos With Dr. Bria Rizo  Call with any questions or concerns  Follow up with Danni Mejia MD for non cardiac problems  Report any new problems  Cardiovascular Fitness-Exercise as tolerated. Strive for 15 minutes of exercise most days of the week. Cardiac / Healthy Diet  Continue current medications as directed  Continue plan of treatment  It is always recommended that you bring your medications bottles with you to each visit - this is for your safety!      ICD will send in 3 mos automatically

## 2018-08-29 NOTE — PROGRESS NOTES
hip removed     Family History   Problem Relation Age of Onset    Diabetes Mother     Cancer Sister         Pancreatic     Social History   Substance Use Topics    Smoking status: Former Smoker     Types: Cigarettes    Smokeless tobacco: Never Used    Alcohol use No      Current Outpatient Prescriptions   Medication Sig Dispense Refill    carvedilol (COREG) 6.25 MG tablet Take 0.5 tablet twice daily      simvastatin (ZOCOR) 10 MG tablet TAKE 1 TABLET EVERY NIGHT  (MUST  HAVE  LABS BEFORE ANY FURTHER REFILLS) 90 tablet 3    isosorbide mononitrate (IMDUR) 30 MG extended release tablet Take 1 tablet by mouth daily 90 tablet 3    nitroGLYCERIN (NITROSTAT) 0.4 MG SL tablet Place 1 tablet under the tongue every 5 minutes as needed 25 tablet 3    aspirin 81 MG EC tablet Take 81 mg by mouth daily. No current facility-administered medications for this visit. Allergies: Patient has no known allergies. Review of Systems  Constitutional - no significant activity change, appetite change, or unexpected weight change. No fever, chills or diaphoresis. No fatigue. HEENT - no significant rhinorrhea or epistaxis. No tinnitus or significant hearing loss. Eyes - no sudden vision change or amaurosis. Respiratory - no significant wheezing, stridor, apnea or cough. No dyspnea on exertion or shortness of breath. Cardiovascular - no exertional chest pain, orthopnea or PND. No sensation of arrhythmia or slow heart rate. No claudication or leg edema. Gastrointestinal - no abdominal swelling or pain. No blood in stool. No severe constipation, diarrhea, nausea, or vomiting. Genitourinary - no difficulty urinating, dysuria, frequency, or urgency. No flank pain or hematuria. Musculoskeletal - no back pain, gait disturbance, or myalgia. Skin - no color change or rash. No pallor. No new surgical incision. Neurologic - no speech difficulty, facial asymmetry or lateralizing weakness.   No seizures, stable. Mode VVI at 40  Recheck in 3 mos. Via [facility] monitoring system    Blood pressure and heart rate control. Medical management with aspirin, beta blocker, long-acting nitrate and statin    He was weaned off his diuretic 2 years ago and doing well. 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 We discussed watching for fluid retention with traveling in altitude. Suggestive take his diuretic with him to take as needed   States taking medications as prescribed  Stable cardiovascular status. No evidence of overt heart failure, angina or dysrhythmia. Plan  Take your diuretic with you to [place] in case of swelling  Follow up in 6 mos With Dr. Ryann Puga  Call with any questions or concerns  Follow up with Maida Buerger, MD for non cardiac problems  Report any new problems  Cardiovascular Fitness-Exercise as tolerated. Strive for 30 minutes of exercise most days of the week. Cardiac / Healthy Diet  Continue current medications as directed  Continue plan of treatment  It is always recommended that you bring your medications bottles with you to each visit - this is for your safety!        JONATHAN Gregg

## 2018-09-28 ENCOUNTER — TRANSCRIBE ORDERS (OUTPATIENT)
Dept: ADMINISTRATIVE | Facility: HOSPITAL | Age: 73
End: 2018-09-28

## 2018-09-28 DIAGNOSIS — S83.8X1A MENISCAL INJURY, RIGHT, INITIAL ENCOUNTER: Primary | ICD-10-CM

## 2018-10-04 ENCOUNTER — HOSPITAL ENCOUNTER (OUTPATIENT)
Dept: GENERAL RADIOLOGY | Facility: HOSPITAL | Age: 73
Discharge: HOME | End: 2018-10-04
Attending: ORTHOPAEDIC SURGERY | Admitting: ORTHOPAEDIC SURGERY

## 2018-10-04 ENCOUNTER — HOSPITAL ENCOUNTER (OUTPATIENT)
Dept: CT IMAGING | Facility: HOSPITAL | Age: 73
Discharge: HOME | End: 2018-10-04
Attending: ORTHOPAEDIC SURGERY

## 2018-10-04 VITALS — SYSTOLIC BLOOD PRESSURE: 132 MMHG | OXYGEN SATURATION: 97 % | DIASTOLIC BLOOD PRESSURE: 87 MMHG | HEART RATE: 71 BPM

## 2018-10-04 DIAGNOSIS — S83.8X1A MENISCAL INJURY, RIGHT, INITIAL ENCOUNTER: ICD-10-CM

## 2018-10-04 PROCEDURE — 77002 NEEDLE LOCALIZATION BY XRAY: CPT

## 2018-10-04 PROCEDURE — 73700 CT LOWER EXTREMITY W/O DYE: CPT

## 2018-10-04 PROCEDURE — 0 IOPAMIDOL 41 % SOLUTION: Performed by: ORTHOPAEDIC SURGERY

## 2018-10-04 RX ORDER — LIDOCAINE HYDROCHLORIDE 10 MG/ML
5 INJECTION, SOLUTION INFILTRATION; PERINEURAL ONCE
Status: COMPLETED | OUTPATIENT
Start: 2018-10-04 | End: 2018-10-04

## 2018-10-04 RX ADMIN — LIDOCAINE HYDROCHLORIDE 5 ML: 10 INJECTION, SOLUTION INFILTRATION; PERINEURAL at 10:33

## 2018-10-04 RX ADMIN — IOPAMIDOL 20 ML: 408 INJECTION, SOLUTION INTRATHECAL at 10:22

## 2018-11-01 ENCOUNTER — APPOINTMENT (OUTPATIENT)
Dept: PREADMISSION TESTING | Facility: HOSPITAL | Age: 73
End: 2018-11-01

## 2018-11-01 VITALS
DIASTOLIC BLOOD PRESSURE: 62 MMHG | HEART RATE: 69 BPM | OXYGEN SATURATION: 96 % | HEIGHT: 70 IN | BODY MASS INDEX: 24.27 KG/M2 | RESPIRATION RATE: 18 BRPM | SYSTOLIC BLOOD PRESSURE: 113 MMHG | WEIGHT: 169.53 LBS

## 2018-11-01 LAB
ANION GAP SERPL CALCULATED.3IONS-SCNC: 11 MMOL/L (ref 4–13)
BUN BLD-MCNC: 15 MG/DL (ref 5–21)
BUN/CREAT SERPL: 17 (ref 7–25)
CALCIUM SPEC-SCNC: 9.3 MG/DL (ref 8.4–10.4)
CHLORIDE SERPL-SCNC: 101 MMOL/L (ref 98–110)
CO2 SERPL-SCNC: 29 MMOL/L (ref 24–31)
CREAT BLD-MCNC: 0.88 MG/DL (ref 0.5–1.4)
DEPRECATED RDW RBC AUTO: 40.2 FL (ref 40–54)
ERYTHROCYTE [DISTWIDTH] IN BLOOD BY AUTOMATED COUNT: 11.9 % (ref 12–15)
GFR SERPL CREATININE-BSD FRML MDRD: 85 ML/MIN/1.73
GLUCOSE BLD-MCNC: 107 MG/DL (ref 70–100)
HCT VFR BLD AUTO: 39.5 % (ref 40–52)
HGB BLD-MCNC: 13.5 G/DL (ref 14–18)
MCH RBC QN AUTO: 31.3 PG (ref 28–32)
MCHC RBC AUTO-ENTMCNC: 34.2 G/DL (ref 33–36)
MCV RBC AUTO: 91.4 FL (ref 82–95)
PLATELET # BLD AUTO: 240 10*3/MM3 (ref 130–400)
PMV BLD AUTO: 11 FL (ref 6–12)
POTASSIUM BLD-SCNC: 4.3 MMOL/L (ref 3.5–5.3)
RBC # BLD AUTO: 4.32 10*6/MM3 (ref 4.8–5.9)
SODIUM BLD-SCNC: 141 MMOL/L (ref 135–145)
WBC NRBC COR # BLD: 5.97 10*3/MM3 (ref 4.8–10.8)

## 2018-11-01 PROCEDURE — 80048 BASIC METABOLIC PNL TOTAL CA: CPT | Performed by: ORTHOPAEDIC SURGERY

## 2018-11-01 PROCEDURE — 36415 COLL VENOUS BLD VENIPUNCTURE: CPT

## 2018-11-01 PROCEDURE — 85027 COMPLETE CBC AUTOMATED: CPT | Performed by: ORTHOPAEDIC SURGERY

## 2018-11-01 PROCEDURE — 93010 ELECTROCARDIOGRAM REPORT: CPT | Performed by: INTERNAL MEDICINE

## 2018-11-01 PROCEDURE — 93005 ELECTROCARDIOGRAM TRACING: CPT

## 2018-11-01 NOTE — DISCHARGE INSTRUCTIONS
DAY OF SURGERY INSTRUCTIONS        YOUR SURGEON: JONAH SCHULER    PROCEDURE: RIGHT KNEE ARTHROSCOPY WITH MENISECTOMY    DATE OF SURGERY: 11/7/2018    ARRIVAL TIME: AS DIRECTED BY OFFICE    DAY OF SURGERY TAKE ONLY THESE MEDICATIONS UNLESS OTHERWISE INSTRUCTED BY YOUR PHYSICIAN: COREG AND IMDUR        MANAGING PAIN AFTER SURGERY    We know you are probably wondering what your pain will be like after surgery.  Following surgery it is unrealistic to expect you will not have pain.   Pain is how our bodies let us know that something is wrong or cautions us to be careful.  That said, our goal is to make your pain tolerable.    Methods we may use to treat your pain include (oral or IV medications, PCAs, epidurals, nerve blocks, etc.)   While some procedures require IV pain medications for a short time after surgery, transitioning to pain medications by mouth allows for better management of pain.   Your nurse will encourage you to take oral pain medications whenever possible.  IV medications work almost immediately, but only last a short while.  Taking medications by mouth allows for a more constant level of medication in your blood stream for a longer period of time.      Once your pain is out of control it is harder to get back under control.  It is important you are aware when your next dose of pain medication is due.  If you are admitted, your nurse may write the time of your next dose on the white board in your room to help you remember.      We are interested in your pain and encourage you to inform us about aggravating factors during your visit.   Many times a simple repositioning every few hours can make a big difference.    If your physician says it is okay, do not let your pain prevent you from getting out of bed. Be sure to call your nurse for assistance prior to getting up so you do not fall.      Before surgery, please decide your tolerable pain goal.  These faces help describe the pain ratings we use on a  0-10 scale.   Be prepared to tell us your goal and whether or not you take pain or anxiety medications at home.          BEFORE YOU COME TO THE HOSPITAL  (Pre-op instructions)  • Do not eat, drink, smoke or chew gum after midnight the night before surgery.  This also includes no mints.  • Morning of surgery take only the medicines you have been instructed with a sip of water unless otherwise instructed  by your physician.  • Do not shave, wear makeup or dark nail polish.  • Remove all jewelry including rings.  • Leave anything you consider valuable at home.  • Leave your suitcase in the car until after your surgery.  • Bring the following with you if applicable:  o Picture ID and insurance, Medicare or Medicaid cards  o Co-pay/deductible required by insurance (cash, check, credit card)  o Copy of advance directive, living will or power-of- documents if not brought to PAT  o CPAP or BIPAP mask and tubing  o Relaxation aids (MP3 player, book, magazine)  • On the day of surgery check in at registration located at the main entrance of the hospital.       Outpatient Surgery Guidelines, Adult  Outpatient procedures are those for which the person having the procedure is allowed to go home the same day as the procedure. Various procedures are done on an outpatient basis. You should follow some general guidelines if you will be having an outpatient procedure.  LET YOUR HEALTH CARE PROVIDER KNOW ABOUT:  · Any allergies you have.  · All medicines you are taking, including vitamins, herbs, eye drops, creams, and over-the-counter medicines.  · Previous problems you or members of your family have had with the use of anesthetics.  · Any blood disorders you have.  · Previous surgeries you have had.  · Medical conditions you have.  RISKS AND COMPLICATIONS  Your health care provider will discuss possible risks and complications with you before surgery. Common risks and complications include:    · Problems due to the use of  anesthetics.  · Blood loss and replacement (does not apply to minor surgical procedures).  · Temporary increase in pain due to surgery.  · Uncorrected pain or problems that the surgery was meant to correct.  · Infection.  · New damage.  BEFORE THE PROCEDURE  · Ask your health care provider about changing or stopping your regular medicines. You may need to stop taking certain medicines in the days or weeks before the procedure.  · Stop smoking at least 2 weeks before surgery. This lowers your risk for complications during and after surgery. Ask your health care provider for help with this if needed.  · Eat your usual meals and a light supper the day before surgery. Continue fluid intake. Do not drink alcohol.  · Do not eat or drink after midnight the night before your surgery.   · Arrange for someone to take you home and to stay with you for 24 hours after the procedure. Medicine given for your procedure may affect your ability to drive or to care for yourself.  · Call your health care provider's office if you develop an illness or problem that may prevent you from safely having your procedure.  AFTER THE PROCEDURE  After surgery, you will be taken to a recovery area, where your progress will be monitored. If there are no complications, you will be allowed to go home when you are awake, stable, and taking fluids well. You may have numbness around the surgical site. Healing will take some time. You will have tenderness at the surgical site and may have some swelling and bruising. You may also have some nausea.  HOME CARE INSTRUCTIONS  · Do not drive for 24 hours, or as directed by your health care provider. Do not drive while taking prescription pain medicines.  · Do not drink alcohol for 24 hours.  · Do not make important decisions or sign legal documents for 24 hours.  · You may resume a normal diet and activities as directed.  · Do not lift anything heavier than 10 pounds (4.5 kg) or play contact sports until your  health care provider says it is okay.  · Change your bandages (dressings) as directed.  · Only take over-the-counter or prescription medicines as directed by your health care provider.  · Follow up with your health care provider as directed.  SEEK MEDICAL CARE IF:  · You have increased bleeding (more than a small spot) from the surgical site.  · You have redness, swelling, or increasing pain in the wound.  · You see pus coming from the wound.  · You have a fever.  · You notice a bad smell coming from the wound or dressing.  · You feel lightheaded or faint.  · You develop a rash.  · You have trouble breathing.  · You develop allergies.  MAKE SURE YOU:  · Understand these instructions.  · Will watch your condition.  · Will get help right away if you are not doing well or get worse.     This information is not intended to replace advice given to you by your health care provider. Make sure you discuss any questions you have with your health care provider.     Document Released: 09/12/2002 Document Revised: 05/03/2016 Document Reviewed: 05/22/2014  [employer] Interactive Patient Education ©2016 [employer]       Fall Prevention in Hospitals, Adult  As a hospital patient, your condition and the treatments you receive can increase your risk for falls. Some additional risk factors for falls in a hospital include:  · Being in an unfamiliar environment.  · Being on bed rest.  · Your surgery.  · Taking certain medicines.  · Your tubing requirements, such as intravenous (IV) therapy or catheters.  It is important that you learn how to decrease fall risks while at the hospital. Below are important tips that can help prevent falls.  SAFETY TIPS FOR PREVENTING FALLS  Talk about your risk of falling.  · Ask your health care provider why you are at risk for falling. Is it your medicine, illness, tubing placement, or something else?  · Make a plan with your health care provider to keep you safe from falls.  · Ask your health care  provider or pharmacist about side effects of your medicines. Some medicines can make you dizzy or affect your coordination.  Ask for help.  · Ask for help before getting out of bed. You may need to press your call button.  · Ask for assistance in getting safely to the toilet.  · Ask for a walker or cane to be put at your bedside. Ask that most of the side rails on your bed be placed up before your health care provider leaves the room.  · Ask family or friends to sit with you.  · Ask for things that are out of your reach, such as your glasses, hearing aids, telephone, bedside table, or call button.  Follow these tips to avoid falling:  · Stay lying or seated, rather than standing, while waiting for help.  · Wear rubber-soled slippers or shoes whenever you walk in the hospital.  · Avoid quick, sudden movements.  ¨ Change positions slowly.  ¨ Sit on the side of your bed before standing.  ¨ Stand up slowly and wait before you start to walk.  · Let your health care provider know if there is a spill on the floor.  · Pay careful attention to the medical equipment, electrical cords, and tubes around you.  · When you need help, use your call button by your bed or in the bathroom. Wait for one of your health care providers to help you.  · If you feel dizzy or unsure of your footing, return to bed and wait for assistance.  · Avoid being distracted by the TV, telephone, or another person in your room.  · Do not lean or support yourself on rolling objects, such as IV poles or bedside tables.     This information is not intended to replace advice given to you by your health care provider. Make sure you discuss any questions you have with your health care provider.     Document Released: 12/15/2001 Document Revised: 01/08/2016 Document Reviewed: 08/25/2013  [employer] Interactive Patient Education ©2016 [employer]       Surgical Site Infections FAQs  What is a Surgical Site Infection (SSI)?  A surgical site infection is an  infection that occurs after surgery in the part of the body where the surgery took place. Most patients who have surgery do not develop an infection. However, infections develop in about 1 to 3 out of every 100 patients who have surgery.  Some of the common symptoms of a surgical site infection are:  · Redness and pain around the area where you had surgery  · Drainage of cloudy fluid from your surgical wound  · Fever  Can SSIs be treated?  Yes. Most surgical site infections can be treated with antibiotics. The antibiotic given to you depends on the bacteria (germs) causing the infection. Sometimes patients with SSIs also need another surgery to treat the infection.  What are some of the things that hospitals are doing to prevent SSIs?  To prevent SSIs, doctors, nurses, and other healthcare providers:  · Clean their hands and arms up to their elbows with an antiseptic agent just before the surgery.  · Clean their hands with soap and water or an alcohol-based hand rub before and after caring for each patient.  · May remove some of your hair immediately before your surgery using electric clippers if the hair is in the same area where the procedure will occur. They should not shave you with a razor.  · Wear special hair covers, masks, gowns, and gloves during surgery to keep the surgery area clean.  · Give you antibiotics before your surgery starts. In most cases, you should get antibiotics within 60 minutes before the surgery starts and the antibiotics should be stopped within 24 hours after surgery.  · Clean the skin at the site of your surgery with a special soap that kills germs.  What can I do to help prevent SSIs?  Before your surgery:  · Tell your doctor about other medical problems you may have. Health problems such as allergies, diabetes, and obesity could affect your surgery and your treatment.  · Quit smoking. Patients who smoke get more infections. Talk to your doctor about how you can quit before your  surgery.  · Do not shave near where you will have surgery. Shaving with a razor can irritate your skin and make it easier to develop an infection.  At the time of your surgery:  · Speak up if someone tries to shave you with a razor before surgery. Ask why you need to be shaved and talk with your surgeon if you have any concerns.  · Ask if you will get antibiotics before surgery.  After your surgery:  · Make sure that your healthcare providers clean their hands before examining you, either with soap and water or an alcohol-based hand rub.  · If you do not see your providers clean their hands, please ask them to do so.  · Family and friends who visit you should not touch the surgical wound or dressings.  · Family and friends should clean their hands with soap and water or an alcohol-based hand rub before and after visiting you. If you do not see them clean their hands, ask them to clean their hands.  What do I need to do when I go home from the hospital?  · Before you go home, your doctor or nurse should explain everything you need to know about taking care of your wound. Make sure you understand how to care for your wound before you leave the hospital.  · Always clean your hands before and after caring for your wound.  · Before you go home, make sure you know who to contact if you have questions or problems after you get home.  · If you have any symptoms of an infection, such as redness and pain at the surgery site, drainage, or fever, call your doctor immediately.  If you have additional questions, please ask your doctor or nurse.  Developed and co-sponsored by The Society for Healthcare Epidemiology of [place] (SHEA); Infectious Diseases Society of [place] (IDSA); American Hospital Association; Association for Professionals in Infection Control and Epidemiology (APIC); Centers for Disease Control and Prevention (CDC); and The Joint Commission.     This information is not intended to replace advice given to you by  your health care provider. Make sure you discuss any questions you have with your health care provider.     Document Released: 12/23/2014 Document Revised: 01/08/2016 Document Reviewed: 03/02/2016  [employer] Interactive Patient Education ©2016 [employer]       [facility]  CHG 4% Patient Instruction Sheet    Preparing the Skin Before Surgery  Preparing or “prepping” skin before surgery can reduce the risk of infection at the surgical site. To make the process easier,[facility] has chosen 4% Chlorhexidine Gluconate (CHG) antiseptic solution.   The steps below outline the prepping process and should be carefully followed.                                                                                                                                                      Prep the skin at the following time(s):                                                      We recommend you shower the night before surgery, and again the morning of surgery with the 4% CHG antiseptic solution using half of the bottle and a cloth each time.  Dress in clean clothes/sleepwear after showering.  See instructions below for application.          Do not apply any lotions or moisturizers.       Do not shave the area to be prepped for at least 2 days prior to surgery.    Clipping the hair may be done immediately prior to your surgery at the hospital    if needed.    Directions:  Thoroughly rinse your body with water.  Apply 4% CHG to a cloth and wash skin gently, paying special attention to the operative site.  Rinse again thoroughly.  Once you have begun using this product do not apply anything else to your skin. If itching or redness persists, rinse affected areas and discontinue use.    When using this product:  • Keep out of eyes, ears, and mouth.  • If solution should contact these areas, rinse out promptly and thoroughly with water.  • For external use only.  • Do not use in genital area, on your face or  head.      PATIENT/FAMILY/RESPONSIBLE PARTY VERBALIZES UNDERSTANDING OF ABOVE EDUCATION.  COPY OF PAIN SCALE GIVEN AND REVIEWED WITH VERBALIZED UNDERSTANDING.

## 2018-11-07 ENCOUNTER — ANESTHESIA (OUTPATIENT)
Dept: PERIOP | Facility: HOSPITAL | Age: 73
End: 2018-11-07

## 2018-11-07 ENCOUNTER — ANESTHESIA EVENT (OUTPATIENT)
Dept: PERIOP | Facility: HOSPITAL | Age: 73
End: 2018-11-07

## 2018-11-07 ENCOUNTER — HOSPITAL ENCOUNTER (OUTPATIENT)
Facility: HOSPITAL | Age: 73
Setting detail: HOSPITAL OUTPATIENT SURGERY
Discharge: HOME | End: 2018-11-07
Attending: ORTHOPAEDIC SURGERY | Admitting: ORTHOPAEDIC SURGERY

## 2018-11-07 VITALS
RESPIRATION RATE: 16 BRPM | DIASTOLIC BLOOD PRESSURE: 66 MMHG | SYSTOLIC BLOOD PRESSURE: 120 MMHG | OXYGEN SATURATION: 97 % | HEART RATE: 70 BPM | TEMPERATURE: 97.4 F

## 2018-11-07 PROCEDURE — 25010000002 FENTANYL CITRATE (PF) 100 MCG/2ML SOLUTION: Performed by: NURSE ANESTHETIST, CERTIFIED REGISTERED

## 2018-11-07 PROCEDURE — 25010000002 ONDANSETRON PER 1 MG: Performed by: NURSE ANESTHETIST, CERTIFIED REGISTERED

## 2018-11-07 PROCEDURE — 25010000002 KETOROLAC TROMETHAMINE PER 15 MG: Performed by: NURSE ANESTHETIST, CERTIFIED REGISTERED

## 2018-11-07 PROCEDURE — 25010000002 PROPOFOL 10 MG/ML EMULSION: Performed by: NURSE ANESTHETIST, CERTIFIED REGISTERED

## 2018-11-07 PROCEDURE — 25010000002 DEXAMETHASONE PER 1 MG: Performed by: NURSE ANESTHETIST, CERTIFIED REGISTERED

## 2018-11-07 PROCEDURE — 25010000002 METHYLPREDNISOLONE PER 80 MG: Performed by: ORTHOPAEDIC SURGERY

## 2018-11-07 RX ORDER — ONDANSETRON 2 MG/ML
4 INJECTION INTRAMUSCULAR; INTRAVENOUS ONCE AS NEEDED
Status: DISCONTINUED | OUTPATIENT
Start: 2018-11-07 | End: 2018-11-07 | Stop reason: HOSPADM

## 2018-11-07 RX ORDER — DEXAMETHASONE SODIUM PHOSPHATE 4 MG/ML
INJECTION, SOLUTION INTRA-ARTICULAR; INTRALESIONAL; INTRAMUSCULAR; INTRAVENOUS; SOFT TISSUE AS NEEDED
Status: DISCONTINUED | OUTPATIENT
Start: 2018-11-07 | End: 2018-11-07 | Stop reason: SURG

## 2018-11-07 RX ORDER — METOCLOPRAMIDE HYDROCHLORIDE 5 MG/ML
5 INJECTION INTRAMUSCULAR; INTRAVENOUS
Status: DISCONTINUED | OUTPATIENT
Start: 2018-11-07 | End: 2018-11-07 | Stop reason: HOSPADM

## 2018-11-07 RX ORDER — PROPOFOL 10 MG/ML
VIAL (ML) INTRAVENOUS AS NEEDED
Status: DISCONTINUED | OUTPATIENT
Start: 2018-11-07 | End: 2018-11-07 | Stop reason: SURG

## 2018-11-07 RX ORDER — OXYCODONE AND ACETAMINOPHEN 7.5; 325 MG/1; MG/1
2 TABLET ORAL ONCE AS NEEDED
Status: DISCONTINUED | OUTPATIENT
Start: 2018-11-07 | End: 2018-11-07 | Stop reason: HOSPADM

## 2018-11-07 RX ORDER — NALOXONE HCL 0.4 MG/ML
0.4 VIAL (ML) INJECTION AS NEEDED
Status: DISCONTINUED | OUTPATIENT
Start: 2018-11-07 | End: 2018-11-07 | Stop reason: HOSPADM

## 2018-11-07 RX ORDER — IBUPROFEN 600 MG/1
600 TABLET ORAL ONCE AS NEEDED
Status: DISCONTINUED | OUTPATIENT
Start: 2018-11-07 | End: 2018-11-07 | Stop reason: HOSPADM

## 2018-11-07 RX ORDER — FENTANYL CITRATE 50 UG/ML
25 INJECTION, SOLUTION INTRAMUSCULAR; INTRAVENOUS AS NEEDED
Status: DISCONTINUED | OUTPATIENT
Start: 2018-11-07 | End: 2018-11-07 | Stop reason: HOSPADM

## 2018-11-07 RX ORDER — OXYCODONE AND ACETAMINOPHEN 10; 325 MG/1; MG/1
1 TABLET ORAL ONCE AS NEEDED
Status: DISCONTINUED | OUTPATIENT
Start: 2018-11-07 | End: 2018-11-07 | Stop reason: HOSPADM

## 2018-11-07 RX ORDER — BUPIVACAINE HYDROCHLORIDE AND EPINEPHRINE 5; 5 MG/ML; UG/ML
INJECTION, SOLUTION EPIDURAL; INTRACAUDAL; PERINEURAL AS NEEDED
Status: DISCONTINUED | OUTPATIENT
Start: 2018-11-07 | End: 2018-11-07 | Stop reason: HOSPADM

## 2018-11-07 RX ORDER — MEPERIDINE HYDROCHLORIDE 25 MG/ML
12.5 INJECTION INTRAMUSCULAR; INTRAVENOUS; SUBCUTANEOUS
Status: DISCONTINUED | OUTPATIENT
Start: 2018-11-07 | End: 2018-11-07 | Stop reason: HOSPADM

## 2018-11-07 RX ORDER — SODIUM CHLORIDE 0.9 % (FLUSH) 0.9 %
3 SYRINGE (ML) INJECTION EVERY 12 HOURS SCHEDULED
Status: DISCONTINUED | OUTPATIENT
Start: 2018-11-07 | End: 2018-11-07 | Stop reason: HOSPADM

## 2018-11-07 RX ORDER — SODIUM CHLORIDE, SODIUM LACTATE, POTASSIUM CHLORIDE, CALCIUM CHLORIDE 600; 310; 30; 20 MG/100ML; MG/100ML; MG/100ML; MG/100ML
9 INJECTION, SOLUTION INTRAVENOUS CONTINUOUS
Status: DISCONTINUED | OUTPATIENT
Start: 2018-11-07 | End: 2018-11-07 | Stop reason: HOSPADM

## 2018-11-07 RX ORDER — HYDROCODONE BITARTRATE AND ACETAMINOPHEN 7.5; 325 MG/1; MG/1
1 TABLET ORAL EVERY 6 HOURS PRN
Qty: 40 TABLET | Refills: 0 | Status: SHIPPED | OUTPATIENT
Start: 2018-11-07 | End: 2018-11-17

## 2018-11-07 RX ORDER — SODIUM CHLORIDE 0.9 % (FLUSH) 0.9 %
1-10 SYRINGE (ML) INJECTION AS NEEDED
Status: DISCONTINUED | OUTPATIENT
Start: 2018-11-07 | End: 2018-11-07 | Stop reason: HOSPADM

## 2018-11-07 RX ORDER — SODIUM CHLORIDE, SODIUM LACTATE, POTASSIUM CHLORIDE, CALCIUM CHLORIDE 600; 310; 30; 20 MG/100ML; MG/100ML; MG/100ML; MG/100ML
100 INJECTION, SOLUTION INTRAVENOUS CONTINUOUS PRN
Status: DISCONTINUED | OUTPATIENT
Start: 2018-11-07 | End: 2018-11-07 | Stop reason: HOSPADM

## 2018-11-07 RX ORDER — METHYLPREDNISOLONE ACETATE 80 MG/ML
INJECTION, SUSPENSION INTRA-ARTICULAR; INTRALESIONAL; INTRAMUSCULAR; SOFT TISSUE AS NEEDED
Status: DISCONTINUED | OUTPATIENT
Start: 2018-11-07 | End: 2018-11-07 | Stop reason: HOSPADM

## 2018-11-07 RX ORDER — ONDANSETRON 2 MG/ML
INJECTION INTRAMUSCULAR; INTRAVENOUS AS NEEDED
Status: DISCONTINUED | OUTPATIENT
Start: 2018-11-07 | End: 2018-11-07 | Stop reason: SURG

## 2018-11-07 RX ORDER — MAGNESIUM HYDROXIDE 1200 MG/15ML
LIQUID ORAL AS NEEDED
Status: DISCONTINUED | OUTPATIENT
Start: 2018-11-07 | End: 2018-11-07 | Stop reason: HOSPADM

## 2018-11-07 RX ORDER — DEXTROSE MONOHYDRATE 25 G/50ML
12.5 INJECTION, SOLUTION INTRAVENOUS AS NEEDED
Status: DISCONTINUED | OUTPATIENT
Start: 2018-11-07 | End: 2018-11-07 | Stop reason: HOSPADM

## 2018-11-07 RX ORDER — DEXAMETHASONE SODIUM PHOSPHATE 4 MG/ML
4 INJECTION, SOLUTION INTRA-ARTICULAR; INTRALESIONAL; INTRAMUSCULAR; INTRAVENOUS; SOFT TISSUE ONCE AS NEEDED
Status: DISCONTINUED | OUTPATIENT
Start: 2018-11-07 | End: 2018-11-07 | Stop reason: HOSPADM

## 2018-11-07 RX ORDER — LABETALOL HYDROCHLORIDE 5 MG/ML
5 INJECTION, SOLUTION INTRAVENOUS
Status: DISCONTINUED | OUTPATIENT
Start: 2018-11-07 | End: 2018-11-07 | Stop reason: HOSPADM

## 2018-11-07 RX ORDER — PHENYLEPHRINE HCL IN 0.9% NACL 0.8MG/10ML
SYRINGE (ML) INTRAVENOUS AS NEEDED
Status: DISCONTINUED | OUTPATIENT
Start: 2018-11-07 | End: 2018-11-07 | Stop reason: SURG

## 2018-11-07 RX ORDER — IPRATROPIUM BROMIDE AND ALBUTEROL SULFATE 2.5; .5 MG/3ML; MG/3ML
3 SOLUTION RESPIRATORY (INHALATION) ONCE AS NEEDED
Status: DISCONTINUED | OUTPATIENT
Start: 2018-11-07 | End: 2018-11-07 | Stop reason: HOSPADM

## 2018-11-07 RX ORDER — KETOROLAC TROMETHAMINE 30 MG/ML
INJECTION, SOLUTION INTRAMUSCULAR; INTRAVENOUS AS NEEDED
Status: DISCONTINUED | OUTPATIENT
Start: 2018-11-07 | End: 2018-11-07 | Stop reason: SURG

## 2018-11-07 RX ORDER — FENTANYL CITRATE 50 UG/ML
25 INJECTION, SOLUTION INTRAMUSCULAR; INTRAVENOUS
Status: DISCONTINUED | OUTPATIENT
Start: 2018-11-07 | End: 2018-11-07 | Stop reason: HOSPADM

## 2018-11-07 RX ORDER — FENTANYL CITRATE 50 UG/ML
INJECTION, SOLUTION INTRAMUSCULAR; INTRAVENOUS AS NEEDED
Status: DISCONTINUED | OUTPATIENT
Start: 2018-11-07 | End: 2018-11-07 | Stop reason: SURG

## 2018-11-07 RX ADMIN — FENTANYL CITRATE 75 MCG: 50 INJECTION, SOLUTION INTRAMUSCULAR; INTRAVENOUS at 10:41

## 2018-11-07 RX ADMIN — LIDOCAINE HYDROCHLORIDE 0.5 ML: 10 INJECTION, SOLUTION EPIDURAL; INFILTRATION; INTRACAUDAL; PERINEURAL at 08:40

## 2018-11-07 RX ADMIN — PROPOFOL 100 MG: 10 INJECTION, EMULSION INTRAVENOUS at 10:41

## 2018-11-07 RX ADMIN — FENTANYL CITRATE 25 MCG: 50 INJECTION, SOLUTION INTRAMUSCULAR; INTRAVENOUS at 10:45

## 2018-11-07 RX ADMIN — Medication 80 MCG: at 10:41

## 2018-11-07 RX ADMIN — SODIUM CHLORIDE, POTASSIUM CHLORIDE, SODIUM LACTATE AND CALCIUM CHLORIDE 100 ML/HR: 600; 310; 30; 20 INJECTION, SOLUTION INTRAVENOUS at 08:40

## 2018-11-07 RX ADMIN — EPHEDRINE SULFATE 10 MG: 50 INJECTION INTRAMUSCULAR; INTRAVENOUS; SUBCUTANEOUS at 11:03

## 2018-11-07 RX ADMIN — PROPOFOL 20 MG: 10 INJECTION, EMULSION INTRAVENOUS at 10:42

## 2018-11-07 RX ADMIN — KETOROLAC TROMETHAMINE 10 MG: 30 INJECTION, SOLUTION INTRAMUSCULAR at 10:51

## 2018-11-07 RX ADMIN — DEXAMETHASONE SODIUM PHOSPHATE 4 MG: 4 INJECTION, SOLUTION INTRAMUSCULAR; INTRAVENOUS at 10:52

## 2018-11-07 RX ADMIN — ONDANSETRON HYDROCHLORIDE 4 MG: 2 SOLUTION INTRAMUSCULAR; INTRAVENOUS at 10:52

## 2018-11-07 RX ADMIN — EPHEDRINE SULFATE 15 MG: 50 INJECTION INTRAMUSCULAR; INTRAVENOUS; SUBCUTANEOUS at 10:55

## 2018-11-07 RX ADMIN — CEFAZOLIN 1 G: 1 INJECTION, POWDER, FOR SOLUTION INTRAMUSCULAR; INTRAVENOUS; PARENTERAL at 10:52

## 2018-11-07 NOTE — ANESTHESIA PREPROCEDURE EVALUATION
Anesthesia Evaluation     Patient summary reviewed   no history of anesthetic complications:  NPO Solid Status: > 8 hours             Airway   Mallampati: I  TM distance: >3 FB  Neck ROM: full  Dental      Pulmonary    (-) asthma, sleep apnea, not a smoker  Cardiovascular   Exercise tolerance: excellent (>7 METS)    ECG reviewed  Patient on routine beta blocker and Beta blocker given within 24 hours of surgery    (+) pacemaker (medtronic protecta) ICD interrogated 1-3 months ago, past MI , CAD, CABG (2011), CHF,   (-) angina, cardiac stents      Neuro/Psych  (-) seizures, TIA, CVA  GI/Hepatic/Renal/Endo    (+)  GERD,    (-) liver disease, no renal disease, diabetes    Musculoskeletal     Abdominal    Substance History      OB/GYN          Other                        Anesthesia Plan    ASA 3     general   (5 lead ekg)  intravenous induction   Anesthetic plan, all risks, benefits, and alternatives have been provided, discussed and informed consent has been obtained with: patient.

## 2018-11-07 NOTE — DISCHARGE INSTRUCTIONS

## 2018-11-07 NOTE — ANESTHESIA POSTPROCEDURE EVALUATION
Patient: Zackery Molina    Procedure Summary     Date:  11/07/18 Room / Location:  [facility] OR  /  [facility] OR    Anesthesia Start:  1036 Anesthesia Stop:  1139    Procedure:  KNEE ARTHROSCOPY WITH MENISCECTOMY (Right Knee) Diagnosis:  (S83.231D)    Surgeon:  Hernesto Rodriguez MD Provider:  Zay Owens CRNA    Anesthesia Type:  general ASA Status:  3          Anesthesia Type: general  Last vitals  BP   129/74 (11/07/18 1200)   Temp   97.4 °F (36.3 °C) (11/07/18 1150)   Pulse   79 (11/07/18 1200)   Resp   16 (11/07/18 1200)     SpO2   95 % (11/07/18 1200)     Post Anesthesia Care and Evaluation    Patient location during evaluation: PACU  Patient participation: complete - patient participated  Level of consciousness: awake and alert  Pain management: adequate  Airway patency: patent  Anesthetic complications: No anesthetic complications    Cardiovascular status: acceptable  Respiratory status: acceptable  Hydration status: acceptable    Comments: Blood pressure 129/74, pulse 79, temperature 97.4 °F (36.3 °C), temperature source Temporal Artery , resp. rate 16, SpO2 95 %.    Pt discharged from PACU based on williams score >8

## 2018-11-07 NOTE — ANESTHESIA PROCEDURE NOTES
Airway    General Information and Staff    CRNA: CHRISTIAN SOLOMON    Indications and Patient Condition  Indications for airway management: CNS depression    Preoxygenated: yes  Mask difficulty assessment: 0 - not attempted    Final Airway Details  Final airway type: supraglottic airway      Successful airway: unique  Size 4    Number of attempts at approach: 1    Additional Comments  Atraumatic Insertion

## 2018-11-07 NOTE — OP NOTE
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    PREPARATION: Routine.    STAFF: Circulator: Isa Peck RN  Scrub Person: Harper Hicks  Assistant: Adrianne Mendenhall    ESTIMATED BLOOD LOSS: None    SPECIMENS: None    COMPLICATIONS: None    INDICATIONS: Zackery Molina is a 73 y.o. male who twisted his right knee has had pain along the medial joint line up.  A CT arthrogram revealed a torn medial meniscus.  Surgeries found to have a complex medial meniscus tear which was resected.  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  The lateral compartment was free of any chondromalacia or cartilage tears patellofemoral compartment was free of any major arthritis. The indications, risks, and possible complications of the procedure were explained to the patient, who voiced understanding and wished to proceed with surgery.     PROCEDURE IN DETAIL: The patient was taken to the operating room and placed on the operating table in a supine position. After general anesthesia was obtained, the right lower extremity was exsanguinated and a tourniquet was inflated to 300 mmHg.  It was then secured in the leg waldron and then it was prepped and draped in a sterile manner.  Anterolateral portal was then created and the arthroscope was inserted and the knee was inflated with saline.  A needle directed inferomedial portal was then created as an operating portal.  A sequential examination of the knee was carried out and the above findings were documented.  Attention was  then placed to the medial compartment using an up-biting Ronjair a partial medial meniscectomy was carried out.  A shaver was then inserted in a bits of meniscal debris were removed and the meniscal remnant was shaved an appropriate contour.  Any bits of cartilaginous debris were then shaved off the medial femoral condyle.  The knee was then thoroughly irrigated with saline and drained of any excess saline.  The portals were then closed with nylon suture.  80 mg of Depo-Medrol and Marcaine was then infiltrated into the knee.  Sterile dressings were applied. The patient tolerated the procedure well. Sponge and needle counts were correct. The patient was then awakened and extubated in the operating room and taken to the recovery room in good condition.    Hernesto Rodriguez MD  Date: 11/7/2018 Time: 11:38 AM

## 2018-11-29 DIAGNOSIS — Z95.810 AICD (AUTOMATIC CARDIOVERTER/DEFIBRILLATOR) PRESENT: Primary | ICD-10-CM

## 2018-11-29 DIAGNOSIS — I25.5 ISCHEMIC CARDIOMYOPATHY: ICD-10-CM

## 2018-11-29 PROCEDURE — 93296 REM INTERROG EVL PM/IDS: CPT | Performed by: CLINICAL NURSE SPECIALIST

## 2018-11-29 PROCEDURE — 93295 DEV INTERROG REMOTE 1/2/MLT: CPT | Performed by: CLINICAL NURSE SPECIALIST

## 2018-12-19 ENCOUNTER — TELEPHONE (OUTPATIENT)
Dept: UROLOGY | Facility: CLINIC | Age: 73
End: 2018-12-19

## 2018-12-19 NOTE — TELEPHONE ENCOUNTER
Called and spoke with the patient about his missed lab appointment. Patient went on ahead and rescheduled their lab appointment for the next day. I did inform the patient of [facility] 24 hr cancellation and no show policy. Patient verbally understood.

## 2018-12-21 LAB
PSA FREE MFR SERPL: 11 %
PSA FREE SERPL-MCNC: 0.77 NG/ML
PSA SERPL-MCNC: 7 NG/ML (ref 0–4)

## 2018-12-21 NOTE — PROGRESS NOTES
Subjective    Mr. Molina is 73 y.o. male    Chief Complaint: Elevated PSA    History of Present Illness     Patient is here with an elevated PSA. The PSA was drawn1 week(s). He does not have a family history of prostate cancer. His AUA Symptom Score is 2 /35. Voiding symptoms include Urgency. Denies Incomplete emptying, Frequency, Intermittency and Weakened stream. Voiding symptoms began several years  . These have been gradual in onset. None. Most recent PSA on 04/27/2018 is 4.9  Lab Results   Component Value Date    PSA 7.0 (H) 12/20/2018         The following portions of the patient's history were reviewed and updated as appropriate: allergies, current medications, past family history, past medical history, past social history, past surgical history and problem list.    Review of Systems   Constitutional: Negative for chills and fever.   Gastrointestinal: Negative for abdominal pain, anal bleeding and blood in stool.   Genitourinary: Negative for decreased urine volume, difficulty urinating, discharge, dysuria, enuresis, flank pain, frequency, genital sores, hematuria, penile pain, penile swelling, scrotal swelling, testicular pain and urgency.         Current Outpatient Medications:   •  aspirin 81 MG EC tablet, Take 81 mg by mouth Daily. WILL STOP 11/2/18, Disp: , Rfl:   •  carvedilol (COREG) 6.25 MG tablet, Take 3.125 mg by mouth 2 (Two) Times a Day With Meals., Disp: , Rfl:   •  isosorbide mononitrate (IMDUR) 30 MG 24 hr tablet, 30 mg Daily., Disp: , Rfl:   •  nitroglycerin (NITROSTAT) 0.4 MG SL tablet, Place  under the tongue., Disp: , Rfl:   •  simvastatin (ZOCOR) 10 MG tablet, Every Night., Disp: , Rfl:     Past Medical History:   Diagnosis Date   • CHF (congestive heart failure) (CMS/HCC)     dr crow    • Colon polyp    • Coronary artery disease    • Dysuria    • GERD (gastroesophageal reflux disease)    • Hyperlipidemia    • Myocardial infarction (CMS/HCC)    • Shingles        Past Surgical History:  "  Procedure Laterality Date   • APPENDECTOMY     • CARDIAC DEFIBRILLATOR PLACEMENT     • CHOLECYSTECTOMY     • COLONOSCOPY     • COLONOSCOPY N/A 10/20/2017    Procedure: COLONOSCOPY WITH ANESTHESIA;  Surgeon: Donny Mock MD;  Location: [facility] ENDOSCOPY;  Service:    • CORONARY ARTERY BYPASS GRAFT     • ENDOSCOPY  2016   • ENDOSCOPY N/A 10/20/2017    Procedure: ESOPHAGOGASTRODUODENOSCOPY WITH ANESTHESIA;  Surgeon: Donny Mock MD;  Location: [facility] ENDOSCOPY;  Service:    • HIP SURGERY Left    • KNEE ARTHROSCOPY Right 2018    Procedure: KNEE ARTHROSCOPY WITH MENISCECTOMY;  Surgeon: Hernesto Rodriguez MD;  Location: [facility] OR;  Service: Orthopedics   • PACEMAKER IMPLANTATION      defibrillator also medtronic       Social History     Socioeconomic History   • Marital status:      Spouse name: Not on file   • Number of children: Not on file   • Years of education: Not on file   • Highest education level: Not on file   Tobacco Use   • Smoking status: Former Smoker     Last attempt to quit: 10/20/1978     Years since quittin.2   • Smokeless tobacco: Never Used   Substance and Sexual Activity   • Alcohol use: No   • Drug use: No   • Sexual activity: Defer       Family History   Problem Relation Age of Onset   • Colon cancer Father        Objective    Temp 98.2 °F (36.8 °C)   Ht 177.8 cm (70\")   Wt 77.1 kg (170 lb)   BMI 24.39 kg/m²     Physical Exam   Constitutional: He is oriented to person, place, and time. He appears well-developed and well-nourished. No distress.   Pulmonary/Chest: Effort normal.   Abdominal: Soft. He exhibits no distension and no mass. There is no tenderness. There is no rebound and no guarding. No hernia.   Neurological: He is alert and oriented to person, place, and time.   Skin: Skin is warm and dry. He is not diaphoretic.   Psychiatric: He has a normal mood and affect.   Vitals reviewed.          Results for orders placed or performed in visit on 18 "   POC Urinalysis Dipstick, Multipro   Result Value Ref Range    Color Yellow Yellow, Straw, Dark Yellow, [place]    Clarity, UA Clear Clear    Glucose, UA Negative Negative, 1000 mg/dL (3+) mg/dL    Bilirubin Negative Negative    Ketones, UA Negative Negative    Specific Gravity  1.005 1.005 - 1.030    Blood, UA Trace (A) Negative    pH, Urine 6.5 5.0 - 8.0    Protein, POC Negative Negative mg/dL    Urobilinogen, UA Normal Normal    Nitrite, UA Negative Negative    Leukocytes Trace (A) Negative   Patient's Body mass index is 24.39 kg/m². BMI is above normal parameters. Recommendations include: educational material.    Assessment and Plan    Diagnoses and all orders for this visit:    Elevated prostate specific antigen (PSA)  -     POC Urinalysis Dipstick, Multipro  -     PSA, Total & Free; Future    BPH with urinary obstruction      Patient had a PSA of 4.9 and underwent transrectal ultrasound-guided guided prostate biopsy June 2018.  This revealed  a volume of 32 mL as well as several biopsies with inflammation.    He returns with a PSA of 7% free of 11%.  I think it is reasonable to continue to watch this as he did have inflammation his last biopsy which certainly can increase his PSA.  If his PSA continues to increase we will proceed with MRI of his prostate.

## 2018-12-22 ENCOUNTER — RESULTS ENCOUNTER (OUTPATIENT)
Dept: UROLOGY | Facility: CLINIC | Age: 73
End: 2018-12-22

## 2018-12-22 DIAGNOSIS — R97.20 ELEVATED PROSTATE SPECIFIC ANTIGEN (PSA): ICD-10-CM

## 2018-12-26 ENCOUNTER — OFFICE VISIT (OUTPATIENT)
Dept: UROLOGY | Facility: CLINIC | Age: 73
End: 2018-12-26

## 2018-12-26 VITALS — WEIGHT: 170 LBS | BODY MASS INDEX: 24.34 KG/M2 | HEIGHT: 70 IN | TEMPERATURE: 98.2 F

## 2018-12-26 DIAGNOSIS — R97.20 ELEVATED PROSTATE SPECIFIC ANTIGEN (PSA): Primary | ICD-10-CM

## 2018-12-26 DIAGNOSIS — N13.8 BPH WITH URINARY OBSTRUCTION: ICD-10-CM

## 2018-12-26 DIAGNOSIS — N40.1 BPH WITH URINARY OBSTRUCTION: ICD-10-CM

## 2018-12-26 LAB
BILIRUB BLD-MCNC: NEGATIVE MG/DL
CLARITY, POC: CLEAR
COLOR UR: YELLOW
GLUCOSE UR STRIP-MCNC: NEGATIVE MG/DL
KETONES UR QL: NEGATIVE
LEUKOCYTE EST, POC: ABNORMAL
NITRITE UR-MCNC: NEGATIVE MG/ML
PH UR: 6.5 [PH] (ref 5–8)
PROT UR STRIP-MCNC: NEGATIVE MG/DL
RBC # UR STRIP: ABNORMAL /UL
SP GR UR: 1 (ref 1–1.03)
UROBILINOGEN UR QL: NORMAL

## 2018-12-26 PROCEDURE — 81001 URINALYSIS AUTO W/SCOPE: CPT | Performed by: UROLOGY

## 2018-12-26 PROCEDURE — 99213 OFFICE O/P EST LOW 20 MIN: CPT | Performed by: UROLOGY

## 2018-12-26 NOTE — PATIENT INSTRUCTIONS

## 2019-03-01 DIAGNOSIS — Z95.810 AICD (AUTOMATIC CARDIOVERTER/DEFIBRILLATOR) PRESENT: Primary | ICD-10-CM

## 2019-03-01 DIAGNOSIS — I25.5 ISCHEMIC CARDIOMYOPATHY: ICD-10-CM

## 2019-03-01 PROCEDURE — 93295 DEV INTERROG REMOTE 1/2/MLT: CPT | Performed by: CLINICAL NURSE SPECIALIST

## 2019-03-01 PROCEDURE — 93296 REM INTERROG EVL PM/IDS: CPT | Performed by: CLINICAL NURSE SPECIALIST

## 2019-03-11 DIAGNOSIS — I25.10 CORONARY ARTERY DISEASE INVOLVING NATIVE CORONARY ARTERY OF NATIVE HEART WITHOUT ANGINA PECTORIS: ICD-10-CM

## 2019-03-11 RX ORDER — ISOSORBIDE MONONITRATE 30 MG/1
TABLET, EXTENDED RELEASE ORAL
Qty: 90 TABLET | Refills: 3 | Status: SHIPPED | OUTPATIENT
Start: 2019-03-11 | End: 2019-06-10 | Stop reason: SDUPTHER

## 2019-03-14 RX ORDER — NITROGLYCERIN 0.4 MG/1
0.4 TABLET SUBLINGUAL EVERY 5 MIN PRN
Qty: 25 TABLET | Refills: 3 | Status: SHIPPED | OUTPATIENT
Start: 2019-03-14 | End: 2022-08-24 | Stop reason: SDUPTHER

## 2019-03-28 ENCOUNTER — OFFICE VISIT (OUTPATIENT)
Dept: CARDIOLOGY | Age: 74
End: 2019-03-28
Payer: MEDICARE

## 2019-03-28 VITALS
HEIGHT: 70 IN | HEART RATE: 72 BPM | BODY MASS INDEX: 25.2 KG/M2 | SYSTOLIC BLOOD PRESSURE: 102 MMHG | DIASTOLIC BLOOD PRESSURE: 64 MMHG | WEIGHT: 176 LBS

## 2019-03-28 DIAGNOSIS — I10 ESSENTIAL HYPERTENSION: ICD-10-CM

## 2019-03-28 DIAGNOSIS — I25.5 ISCHEMIC CARDIOMYOPATHY: ICD-10-CM

## 2019-03-28 DIAGNOSIS — Z95.810 AICD (AUTOMATIC CARDIOVERTER/DEFIBRILLATOR) PRESENT: ICD-10-CM

## 2019-03-28 DIAGNOSIS — I25.10 CORONARY ARTERY DISEASE INVOLVING NATIVE CORONARY ARTERY OF NATIVE HEART WITHOUT ANGINA PECTORIS: Primary | ICD-10-CM

## 2019-03-28 PROCEDURE — G8598 ASA/ANTIPLAT THER USED: HCPCS | Performed by: CLINICAL NURSE SPECIALIST

## 2019-03-28 PROCEDURE — 99213 OFFICE O/P EST LOW 20 MIN: CPT | Performed by: CLINICAL NURSE SPECIALIST

## 2019-03-28 PROCEDURE — 1036F TOBACCO NON-USER: CPT | Performed by: CLINICAL NURSE SPECIALIST

## 2019-03-28 PROCEDURE — G8419 CALC BMI OUT NRM PARAM NOF/U: HCPCS | Performed by: CLINICAL NURSE SPECIALIST

## 2019-03-28 PROCEDURE — 3017F COLORECTAL CA SCREEN DOC REV: CPT | Performed by: CLINICAL NURSE SPECIALIST

## 2019-03-28 PROCEDURE — G8427 DOCREV CUR MEDS BY ELIG CLIN: HCPCS | Performed by: CLINICAL NURSE SPECIALIST

## 2019-03-28 PROCEDURE — 93289 INTERROG DEVICE EVAL HEART: CPT | Performed by: CLINICAL NURSE SPECIALIST

## 2019-03-28 PROCEDURE — G8484 FLU IMMUNIZE NO ADMIN: HCPCS | Performed by: CLINICAL NURSE SPECIALIST

## 2019-03-28 PROCEDURE — 1123F ACP DISCUSS/DSCN MKR DOCD: CPT | Performed by: CLINICAL NURSE SPECIALIST

## 2019-03-28 PROCEDURE — 4040F PNEUMOC VAC/ADMIN/RCVD: CPT | Performed by: CLINICAL NURSE SPECIALIST

## 2019-05-08 ENCOUNTER — OFFICE VISIT (OUTPATIENT)
Dept: OTOLARYNGOLOGY | Facility: CLINIC | Age: 74
End: 2019-05-08

## 2019-05-08 VITALS
BODY MASS INDEX: 24.62 KG/M2 | OXYGEN SATURATION: 99 % | HEIGHT: 70 IN | TEMPERATURE: 98.6 F | WEIGHT: 172 LBS | DIASTOLIC BLOOD PRESSURE: 62 MMHG | SYSTOLIC BLOOD PRESSURE: 108 MMHG | HEART RATE: 72 BPM

## 2019-05-08 DIAGNOSIS — L82.0 INFLAMED SEBORRHEIC KERATOSIS: Primary | ICD-10-CM

## 2019-05-08 PROCEDURE — 99202 OFFICE O/P NEW SF 15 MIN: CPT | Performed by: OTOLARYNGOLOGY

## 2019-06-10 DIAGNOSIS — I25.10 CORONARY ARTERY DISEASE INVOLVING NATIVE CORONARY ARTERY OF NATIVE HEART WITHOUT ANGINA PECTORIS: ICD-10-CM

## 2019-06-10 RX ORDER — CARVEDILOL 6.25 MG/1
3.12 TABLET ORAL 2 TIMES DAILY WITH MEALS
Qty: 60 TABLET | Refills: 5 | Status: SHIPPED | OUTPATIENT
Start: 2019-06-10 | End: 2019-06-21 | Stop reason: SDUPTHER

## 2019-06-10 RX ORDER — ISOSORBIDE MONONITRATE 30 MG/1
TABLET, EXTENDED RELEASE ORAL
Qty: 90 TABLET | Refills: 3 | Status: SHIPPED | OUTPATIENT
Start: 2019-06-10 | End: 2020-05-12

## 2019-06-10 RX ORDER — SIMVASTATIN 10 MG
TABLET ORAL
Qty: 90 TABLET | Refills: 3 | Status: SHIPPED | OUTPATIENT
Start: 2019-06-10 | End: 2019-06-28 | Stop reason: SDUPTHER

## 2019-06-21 RX ORDER — CARVEDILOL 6.25 MG/1
TABLET ORAL
Qty: 180 TABLET | Refills: 3 | Status: SHIPPED | OUTPATIENT
Start: 2019-06-21 | End: 2019-10-03 | Stop reason: DRUGHIGH

## 2019-06-24 ENCOUNTER — RESULTS ENCOUNTER (OUTPATIENT)
Dept: UROLOGY | Facility: CLINIC | Age: 74
End: 2019-06-24

## 2019-06-24 DIAGNOSIS — R97.20 ELEVATED PROSTATE SPECIFIC ANTIGEN (PSA): ICD-10-CM

## 2019-06-25 ENCOUNTER — PROCEDURE VISIT (OUTPATIENT)
Dept: OTOLARYNGOLOGY | Facility: CLINIC | Age: 74
End: 2019-06-25

## 2019-06-25 VITALS
DIASTOLIC BLOOD PRESSURE: 84 MMHG | TEMPERATURE: 97.2 F | HEART RATE: 75 BPM | HEIGHT: 70 IN | BODY MASS INDEX: 24.2 KG/M2 | WEIGHT: 169 LBS | SYSTOLIC BLOOD PRESSURE: 128 MMHG | RESPIRATION RATE: 16 BRPM

## 2019-06-25 DIAGNOSIS — L82.0 INFLAMED SEBORRHEIC KERATOSIS: Primary | ICD-10-CM

## 2019-06-25 PROCEDURE — 88305 TISSUE EXAM BY PATHOLOGIST: CPT | Performed by: OTOLARYNGOLOGY

## 2019-06-25 PROCEDURE — 13132 CMPLX RPR F/C/C/M/N/AX/G/H/F: CPT | Performed by: OTOLARYNGOLOGY

## 2019-06-25 PROCEDURE — 11443 EXC FACE-MM B9+MARG 2.1-3 CM: CPT | Performed by: OTOLARYNGOLOGY

## 2019-06-25 NOTE — PROGRESS NOTES
Preprocedure diagnosis: Inflamed seborrheic keratosis skin of left temple    Post procedure diagnosis: Inflamed seborrheic keratosis skin of left temple    Procedure performed: 1) Excision neoplasm of skin of left temple, 2.2 x 3.7 cm     2) complex closure of skin of left temple, 6 cm     Surgeon: Ash Watkins M.D.    Anesthesia: Local with 10 cc of 1% lidocaine with 1:100,000 epinephrine    Specimen: Inflamed seborrheic keratosis of left temple- stitch at 12 o'clock     Complications: None    Disposition: Home    Lesion: 2.0 x 3.5 cm  Margins: 1 mm  Defect: 2.2 cm x 3.7 cm  Depth: Through dermis  Closure Length: 6 cm    Details: After patient verification and informed consent was obtained, the skin was prepped with alcohol and infiltrated with 1% lidocaine with 1:100,000 epinephrine.  After approximately 10-15 minutes the skin was tested for anesthesia.  The skin was then sterilely prepped with Hibiclens and the patient was sterilely draped.    The skin surrounding the lesion was incised in oval fashion with a 15 blade beveling the incision laterally to facilitate wound eversion.  The skin was grasped and the lesion was removed from the underlying tissue utilizing sharp dissection with the 15 blade.  The specimen was oriented with a stitch at 12 o'clock and sent for permanent section analysis.  Hemostasis was obtained with bipolar cautery set at 12.      The surrounding skin was undermined in the subcutaneous plane for approximately 2.5 cm in each direction utilizing a fresh 15 blade.  Hemostasis was again obtained with bipolar cautery set at 12.  The tissue was advanced and closed utilizing 3-0 and 4-0 undyed Vicryl suture in buried fashion, followed by vertical mattress 4-0 nylon followed by running, locking 4-0 and 5-0 nylon.  Antibiotic ointment was placed to the incision.    The patient tolerated the procedure without any complication.

## 2019-06-26 DIAGNOSIS — R97.20 ELEVATED PROSTATE SPECIFIC ANTIGEN (PSA): Primary | ICD-10-CM

## 2019-06-27 ENCOUNTER — RESULTS ENCOUNTER (OUTPATIENT)
Dept: UROLOGY | Facility: CLINIC | Age: 74
End: 2019-06-27

## 2019-06-27 DIAGNOSIS — R97.20 ELEVATED PROSTATE SPECIFIC ANTIGEN (PSA): ICD-10-CM

## 2019-06-27 LAB
CYTO UR: NORMAL
LAB AP CASE REPORT: NORMAL
LAB AP CLINICAL INFORMATION: NORMAL
PATH REPORT.FINAL DX SPEC: NORMAL
PATH REPORT.GROSS SPEC: NORMAL

## 2019-06-28 ENCOUNTER — TELEPHONE (OUTPATIENT)
Dept: UROLOGY | Facility: CLINIC | Age: 74
End: 2019-06-28

## 2019-06-28 DIAGNOSIS — Z95.810 AICD (AUTOMATIC CARDIOVERTER/DEFIBRILLATOR) PRESENT: Primary | ICD-10-CM

## 2019-06-28 DIAGNOSIS — I25.5 ISCHEMIC CARDIOMYOPATHY: ICD-10-CM

## 2019-06-28 PROCEDURE — 93295 DEV INTERROG REMOTE 1/2/MLT: CPT | Performed by: CLINICAL NURSE SPECIALIST

## 2019-06-28 PROCEDURE — 93296 REM INTERROG EVL PM/IDS: CPT | Performed by: CLINICAL NURSE SPECIALIST

## 2019-06-28 RX ORDER — SIMVASTATIN 10 MG
TABLET ORAL
Qty: 90 TABLET | Refills: 3 | Status: SHIPPED | OUTPATIENT
Start: 2019-06-28 | End: 2019-07-03 | Stop reason: SDUPTHER

## 2019-06-28 NOTE — TELEPHONE ENCOUNTER
Pt did not show for his appt 6-28-19.  I tried to call Pt but he did not answer and there is no voicemail.    I am sending letter to Pt about No Show.      Thanks,    Woo

## 2019-07-03 ENCOUNTER — OFFICE VISIT (OUTPATIENT)
Dept: OTOLARYNGOLOGY | Facility: CLINIC | Age: 74
End: 2019-07-03

## 2019-07-03 VITALS — BODY MASS INDEX: 24.34 KG/M2 | HEIGHT: 70 IN | WEIGHT: 170 LBS | RESPIRATION RATE: 16 BRPM | TEMPERATURE: 98 F

## 2019-07-03 DIAGNOSIS — L82.0 INFLAMED SEBORRHEIC KERATOSIS: Primary | ICD-10-CM

## 2019-07-03 DIAGNOSIS — R97.20 ELEVATED PROSTATE SPECIFIC ANTIGEN (PSA): Primary | ICD-10-CM

## 2019-07-03 PROCEDURE — 99024 POSTOP FOLLOW-UP VISIT: CPT | Performed by: OTOLARYNGOLOGY

## 2019-07-03 RX ORDER — SIMVASTATIN 10 MG
TABLET ORAL
Qty: 10 TABLET | Refills: 0 | Status: SHIPPED | OUTPATIENT
Start: 2019-07-03 | End: 2020-06-23 | Stop reason: SDUPTHER

## 2019-07-03 NOTE — PROGRESS NOTES
"Zackery Molina returns to the office following excision of a neoplasm of the left temple with complex closure on June 25, 2019.    SUBJECTIVE:  He is doing well without complaint.  He denies any significant pain or wound drainage.  He is heading to [facility] for vacation.    OBJECTIVE:  Temp 98 °F (36.7 °C)   Resp 16   Ht 177.8 cm (70\")   Wt 77.1 kg (170 lb)   BMI 24.39 kg/m²   Incision is healing well without evidence of infection.  The sutures were removed.  His brow elevation is symmetric and intact.    Pathology:      ASSESSMENT:  Zackery was seen today for post-op.    Diagnoses and all orders for this visit:    Inflamed seborrheic keratosis        PLAN:     Pathology discussion - benign.  Protect the incisions from sunlight. Sunlight to the incisions will cause permanent pigmentation to the incision line and make the incision more noticeable. After the incision has reepithelialized (typically 2-3 weeks after the procedure), you may begin to use sunscreen with an SPF of 15 or greater    I discussed the use of a silicone-based wound cream to the incisions to optimize the end result. Apply topically twice daily, or as directed, to help optimize wound healing and decrease redness.    F/U 6 months for recheck.      Ash Watkins MD   07/03/2019  9:28 AM    "

## 2019-07-16 ENCOUNTER — LAB (OUTPATIENT)
Dept: LAB | Facility: HOSPITAL | Age: 74
End: 2019-07-16

## 2019-07-16 DIAGNOSIS — R97.20 ELEVATED PROSTATE SPECIFIC ANTIGEN (PSA): ICD-10-CM

## 2019-07-16 PROCEDURE — 84153 ASSAY OF PSA TOTAL: CPT | Performed by: UROLOGY

## 2019-07-16 PROCEDURE — 36415 COLL VENOUS BLD VENIPUNCTURE: CPT

## 2019-07-16 PROCEDURE — 84154 ASSAY OF PSA FREE: CPT | Performed by: UROLOGY

## 2019-07-17 LAB
PSA FREE MFR SERPL: 13.1 %
PSA FREE SERPL-MCNC: 0.94 NG/ML
PSA SERPL-MCNC: 7.2 NG/ML (ref 0–4)

## 2019-07-18 NOTE — PROGRESS NOTES
Subjective    Mr. Molina is 74 y.o. male    Chief Complaint: Elevated PSA    History of Present Illness       Patient is here with an elevated PSA. The PSA was drawn1 week(s). He does not have a family history of prostate cancer. His AUA Symptom Score is 2 /35. Voiding symptoms include Urgency. Denies Incomplete emptying, Frequency, Intermittency and Weakened stream. Voiding symptoms began several years  . These have been gradual in onset. None. Most recent PSA 7.2    Lab Results   Component Value Date    PSA 7.2 (H) 07/16/2019    PSA 7.0 (H) 12/20/2018       The following portions of the patient's history were reviewed and updated as appropriate: allergies, current medications, past family history, past medical history, past social history, past surgical history and problem list.    Review of Systems   Constitutional: Negative for chills and fever.   Gastrointestinal: Negative for abdominal pain, anal bleeding and blood in stool.   Genitourinary: Positive for urgency. Negative for decreased urine volume, difficulty urinating, discharge, dysuria, enuresis, flank pain, frequency, genital sores, hematuria, penile pain, penile swelling, scrotal swelling and testicular pain.         Current Outpatient Medications:   •  aspirin 81 MG EC tablet, Take 81 mg by mouth Daily. WILL STOP 11/2/18, Disp: , Rfl:   •  carvedilol (COREG) 3.125 MG tablet, Take 3.125 mg by mouth 2 (Two) Times a Day With Meals., Disp: , Rfl:   •  isosorbide mononitrate (IMDUR) 30 MG 24 hr tablet, 30 mg Daily., Disp: , Rfl:   •  nitroglycerin (NITROSTAT) 0.4 MG SL tablet, Place  under the tongue., Disp: , Rfl:   •  simvastatin (ZOCOR) 10 MG tablet, Every Night., Disp: , Rfl:     Past Medical History:   Diagnosis Date   • CHF (congestive heart failure) (CMS/[facility])     dr crow    • Childhood asthma    • Colon polyp    • Coronary artery disease    • Dysuria    • GERD (gastroesophageal reflux disease)    • Hyperlipidemia    • Myocardial infarction  (CMS/[facility])    • Seborrheic keratosis 2019    Left [facility]    • Shingles        Past Surgical History:   Procedure Laterality Date   • APPENDECTOMY     • CARDIAC DEFIBRILLATOR PLACEMENT     • CHOLECYSTECTOMY     • COLONOSCOPY     • COLONOSCOPY N/A 10/20/2017    Procedure: COLONOSCOPY WITH ANESTHESIA;  Surgeon: Donny Mock MD;  Location: [facility] ENDOSCOPY;  Service:    • CORONARY ARTERY BYPASS GRAFT     • ENDOSCOPY  2016   • ENDOSCOPY N/A 10/20/2017    Procedure: ESOPHAGOGASTRODUODENOSCOPY WITH ANESTHESIA;  Surgeon: Donny Mock MD;  Location: [facility] ENDOSCOPY;  Service:    • EXCISION LESION Left 2019    Left [facility]  Path- Inflamed pigmented seborrheic keratosis   • HIP SURGERY Left    • KNEE ARTHROSCOPY Right 2018    Procedure: KNEE ARTHROSCOPY WITH MENISCECTOMY;  Surgeon: Hernesto Rodriguez MD;  Location: [facility] OR;  Service: Orthopedics   • PACEMAKER IMPLANTATION      defibrillator also medtronic       Social History     Socioeconomic History   • Marital status:      Spouse name: Not on file   • Number of children: Not on file   • Years of education: Not on file   • Highest education level: Not on file   Tobacco Use   • Smoking status: Former Smoker     Last attempt to quit: 10/20/1978     Years since quittin.7   • Smokeless tobacco: Never Used   Substance and Sexual Activity   • Alcohol use: No   • Drug use: No   • Sexual activity: Defer       Family History   Problem Relation Age of Onset   • Colon cancer Father        Objective    There were no vitals taken for this visit.    Physical Exam   Constitutional: He is oriented to person, place, and time. He appears well-developed and well-nourished. No distress.   Pulmonary/Chest: Effort normal.   Abdominal: Soft. He exhibits no distension and no mass. There is no tenderness. There is no rebound and no guarding. No hernia.   Neurological: He is alert and oriented to person, place, and time.   Skin: Skin is warm and dry. He  is not diaphoretic.   Psychiatric: He has a normal mood and affect.   Vitals reviewed.          Results for orders placed or performed in visit on 07/16/19   PSA, Total & Free   Result Value Ref Range    PSA 7.2 (H) 0.0 - 4.0 ng/mL    PSA, Free 0.94 N/A ng/mL    PSA, Free % 13.1 %   Patient's There is no height or weight on file to calculate BMI. BMI is within normal parameters. No follow-up required..    Assessment and Plan    Diagnoses and all orders for this visit:    Elevated prostate specific antigen (PSA)  -     Cancel: POC Urinalysis Dipstick, Multipro  -     MRI Pelvis With & Without Contrast; Future    BPH with urinary obstruction    Patient had a PSA of 4.9 and underwent transrectal ultrasound-guided guided prostate biopsy June 2018.  This revealed  a volume of 32 mL as well as several biopsies with inflammation.     His PSA has increased to 7.2.  His PSA continues to increase and I am concerned about the possibility of prostate cancer I am going to proceed with MRI of his prostate with potential MRI fusion biopsy.

## 2019-07-25 ENCOUNTER — OFFICE VISIT (OUTPATIENT)
Dept: UROLOGY | Facility: CLINIC | Age: 74
End: 2019-07-25

## 2019-07-25 VITALS — BODY MASS INDEX: 24.34 KG/M2 | WEIGHT: 170 LBS | HEIGHT: 70 IN | TEMPERATURE: 98 F

## 2019-07-25 DIAGNOSIS — N40.1 BPH WITH URINARY OBSTRUCTION: ICD-10-CM

## 2019-07-25 DIAGNOSIS — R97.20 ELEVATED PROSTATE SPECIFIC ANTIGEN (PSA): Primary | ICD-10-CM

## 2019-07-25 DIAGNOSIS — N13.8 BPH WITH URINARY OBSTRUCTION: ICD-10-CM

## 2019-07-25 PROCEDURE — 99214 OFFICE O/P EST MOD 30 MIN: CPT | Performed by: UROLOGY

## 2019-08-06 ENCOUNTER — HOSPITAL ENCOUNTER (OUTPATIENT)
Dept: MRI IMAGING | Facility: HOSPITAL | Age: 74
Discharge: HOME | End: 2019-08-06

## 2019-08-06 DIAGNOSIS — R97.20 ELEVATED PROSTATE SPECIFIC ANTIGEN (PSA): ICD-10-CM

## 2019-08-09 DIAGNOSIS — R97.20 ELEVATED PROSTATE SPECIFIC ANTIGEN (PSA): Primary | ICD-10-CM

## 2019-08-09 RX ORDER — CEPHALEXIN 500 MG/1
500 CAPSULE ORAL 3 TIMES DAILY
Qty: 9 CAPSULE | Refills: 0 | Status: SHIPPED | OUTPATIENT
Start: 2019-08-09 | End: 2019-08-12

## 2019-08-13 ENCOUNTER — TELEPHONE (OUTPATIENT)
Dept: UROLOGY | Facility: CLINIC | Age: 74
End: 2019-08-13

## 2019-08-13 NOTE — TELEPHONE ENCOUNTER
Pt called and stated he cancelled the MRI and appt for tomorrow bc of his pacemaker.   He is wanting to schedule a biopsy.  Latosha stated MRI needs to be done before biopsy.      Can you or Dr. Warren advise on the next step for pt please?    Thanks in advance.

## 2019-08-13 NOTE — TELEPHONE ENCOUNTER
Pt has a Biopsy sched. Wed. 8/21/19, pt cannot get MRI due to pacemaker and Dr. Warren is aware Thanks.

## 2019-09-11 ENCOUNTER — PROCEDURE VISIT (OUTPATIENT)
Dept: UROLOGY | Facility: CLINIC | Age: 74
End: 2019-09-11

## 2019-09-11 DIAGNOSIS — R97.20 ELEVATED PROSTATE SPECIFIC ANTIGEN (PSA): Primary | ICD-10-CM

## 2019-09-11 PROCEDURE — 76942 ECHO GUIDE FOR BIOPSY: CPT | Performed by: UROLOGY

## 2019-09-11 PROCEDURE — 96372 THER/PROPH/DIAG INJ SC/IM: CPT | Performed by: UROLOGY

## 2019-09-11 PROCEDURE — G0416 PROSTATE BIOPSY, ANY MTHD: HCPCS | Performed by: UROLOGY

## 2019-09-11 PROCEDURE — 88342 IMHCHEM/IMCYTCHM 1ST ANTB: CPT | Performed by: UROLOGY

## 2019-09-11 PROCEDURE — 55700 PR PROSTATE NEEDLE BIOPSY ANY APPROACH: CPT | Performed by: UROLOGY

## 2019-09-11 RX ORDER — GENTAMICIN SULFATE 40 MG/ML
80 INJECTION, SOLUTION INTRAMUSCULAR; INTRAVENOUS EVERY 12 HOURS
Status: COMPLETED | OUTPATIENT
Start: 2019-09-11 | End: 2019-09-11

## 2019-09-11 RX ADMIN — GENTAMICIN SULFATE 80 MG: 40 INJECTION, SOLUTION INTRAMUSCULAR; INTRAVENOUS at 09:03

## 2019-09-11 NOTE — PROGRESS NOTES
Indications:  Elevated PSA    Pre-operative prep:  fleets enema, oral antibiotic, IM gentamicin and stopped aspirin, coumadin, and other anticoagulants    Last PSA:  7.2    Procedure:    After proper identification of patient and procedure, patient was placed in the left later decubitus position.  2% lidocaine jelly was instilled per rectum  for topical anesthesia.  The ultrasound probe was gently inserted per rectum. Prostate was scanned from the base of the bladder to the apex.  20 cc of 1% lidocaine plain was then used to perform a prostate nerve block injecting the junction of the seminal vesicle and bladder laterally.      Prostate length: 4.36 cm    Prostate width: 46.3 cm    Prostate height: 2.96 cm    Prostate volume: 31.3 cc    PSA density: 0.23    Abnormal findings:  Hypoechoic lesions left base and mid    Median lobe:  no    A total of 12 biopsies were taken from the base, apex, and mid portion of the gland on both the right and the left sides.     Patient tolerated the procedure well    Complications: none    Estimated blood loss: minimal    Mr. Molina was given instructions for follow up.  He will notify the office if he has excessive hematuria, hematochezia, fevers, perineal, or abdominal pain.    Follow up:   Call with pathology results and schedule follow up.

## 2019-09-13 LAB
CYTO UR: NORMAL
LAB AP CASE REPORT: NORMAL
LAB AP INTRADEPARTMENTAL CONSULT: NORMAL
LAB AP SYNOPTIC CHECKLIST: NORMAL
PATH REPORT.FINAL DX SPEC: NORMAL
PATH REPORT.GROSS SPEC: NORMAL

## 2019-09-25 ENCOUNTER — OFFICE VISIT (OUTPATIENT)
Dept: UROLOGY | Facility: CLINIC | Age: 74
End: 2019-09-25

## 2019-09-25 VITALS — WEIGHT: 165 LBS | BODY MASS INDEX: 23.62 KG/M2 | TEMPERATURE: 97.1 F | HEIGHT: 70 IN

## 2019-09-25 DIAGNOSIS — C61 PROSTATE CANCER (HCC): Primary | ICD-10-CM

## 2019-09-25 PROCEDURE — 99215 OFFICE O/P EST HI 40 MIN: CPT | Performed by: UROLOGY

## 2019-09-25 NOTE — PROGRESS NOTES
Subjective    Mr. Molina is 74 y.o. male    Chief Complaint: Prostate Cancer    History of Present Illness     He is here today to discuss his newly diagnosed prostate cancer.  His biopsy was done 2 week(s) ago. This was done in the context of Elevated PSA. Severity best described as adenocarcinoma in 1/12 cores with the maximum liss grade of 3+4. The patient did not need imaging based on risk stratification. . Symptoms include obstructive voiding symptoms.  The patients potency status can be defined as Difficulty obtaining an erection.    The following portions of the patient's history were reviewed and updated as appropriate: allergies, current medications, past family history, past medical history, past social history, past surgical history and problem list.    Review of Systems   Constitutional: Negative for chills and fever.   Gastrointestinal: Negative for abdominal pain, anal bleeding and blood in stool.   Genitourinary: Positive for urgency. Negative for decreased urine volume, difficulty urinating, discharge, dysuria, enuresis, flank pain, frequency, genital sores, hematuria, penile pain, penile swelling, scrotal swelling and testicular pain.         Current Outpatient Medications:   •  aspirin 81 MG EC tablet, Take 81 mg by mouth Daily. WILL STOP 11/2/18, Disp: , Rfl:   •  carvedilol (COREG) 3.125 MG tablet, Take 3.125 mg by mouth 2 (Two) Times a Day With Meals., Disp: , Rfl:   •  isosorbide mononitrate (IMDUR) 30 MG 24 hr tablet, 30 mg Daily., Disp: , Rfl:   •  nitroglycerin (NITROSTAT) 0.4 MG SL tablet, Place  under the tongue., Disp: , Rfl:   •  simvastatin (ZOCOR) 10 MG tablet, Every Night., Disp: , Rfl:     Past Medical History:   Diagnosis Date   • CHF (congestive heart failure) (CMS/HCC)     dr crow    • Childhood asthma    • Colon polyp    • Coronary artery disease    • Dysuria    • GERD (gastroesophageal reflux disease)    • Hyperlipidemia    • Myocardial infarction (CMS/HCC)    •  "Seborrheic keratosis 2019    Left [place]    • Shingles        Past Surgical History:   Procedure Laterality Date   • APPENDECTOMY     • CARDIAC DEFIBRILLATOR PLACEMENT     • CHOLECYSTECTOMY     • COLONOSCOPY     • COLONOSCOPY N/A 10/20/2017    Procedure: COLONOSCOPY WITH ANESTHESIA;  Surgeon: Donny Mock MD;  Location: [facility] ENDOSCOPY;  Service:    • CORONARY ARTERY BYPASS GRAFT     • ENDOSCOPY  2016   • ENDOSCOPY N/A 10/20/2017    Procedure: ESOPHAGOGASTRODUODENOSCOPY WITH ANESTHESIA;  Surgeon: Donny Mock MD;  Location: [facility] ENDOSCOPY;  Service:    • EXCISION LESION Left 2019    Left [place]  Path- Inflamed pigmented seborrheic keratosis   • HIP SURGERY Left    • KNEE ARTHROSCOPY Right 2018    Procedure: KNEE ARTHROSCOPY WITH MENISCECTOMY;  Surgeon: Hernesto Rodriguez MD;  Location: [facility] OR;  Service: Orthopedics   • PACEMAKER IMPLANTATION      defibrillator also medtronic       Social History     Socioeconomic History   • Marital status:      Spouse name: Not on file   • Number of children: Not on file   • Years of education: Not on file   • Highest education level: Not on file   Tobacco Use   • Smoking status: Former Smoker     Last attempt to quit: 10/20/1978     Years since quittin.9   • Smokeless tobacco: Never Used   Substance and Sexual Activity   • Alcohol use: No   • Drug use: No   • Sexual activity: Defer       Family History   Problem Relation Age of Onset   • Colon cancer Father        Objective    Temp 97.1 °F (36.2 °C)   Ht 177.8 cm (70\")   Wt 74.8 kg (165 lb)   BMI 23.68 kg/m²     Physical Exam   Constitutional: He is oriented to person, place, and time. He appears well-developed and well-nourished. No distress.   Pulmonary/Chest: Effort normal.   Abdominal: Soft. He exhibits no distension and no mass. There is no tenderness. There is no rebound and no guarding. No hernia.   Neurological: He is alert and oriented to person, place, and time. "   Skin: Skin is warm and dry. He is not diaphoretic.   Psychiatric: He has a normal mood and affect.   Vitals reviewed.          Results for orders placed or performed in visit on 09/11/19   Tissue Pathology Exam   Result Value Ref Range    Case Report       Surgical Pathology Report                         Case: [id number]                                  Authorizing Provider:  Haim Warren MD  Collected:           09/11/2019 08:11 AM          Ordering Location:     [facility]     Received:            09/12/2019 08:24 AM                                 GROUP UROLOGY                                                                Pathologist:           Sheeba Chiu MD                                                          Specimens:   1) - Prostate, Needle Biopsy Left Lateral Base                                                      2) - Prostate, Needle Biopsy Left Lateral Mid                                                       3) - Prostate, Needle Biopsy Left Lateral [place]                                                      4) - Prostate, Needle Biopsy Left Base                                                              5) - Prostate, Needle Biopsy Left Mid                                                                6) - Prostate, Needle Biopsy Left [place]                                                              7) - Prostate, Needle Biopsy Right Base                                                             8) - Prostate, Needle Biopsy Right Mid                                                              9) - Prostate, Needle Biopsy Right [place]                                                             10) - Prostate, Needle Biopsy Right Lateral Base                                                    11) - Prostate, Needle Biopsy Right Lateral Mid                                                     12) - Prostate, Needle Biopsy Right Lateral [place]                                            Final Diagnosis       1.  Prostate, left lateral base, needle biopsy:  Benign prostatic glands and stroma with atrophy    2.  Prostate, left lateral mid, needle biopsy:  Benign prostatic glands and stroma with atrophy    3.  Prostate, left lateral apex, needle biopsy:  Benign prostatic glands and stroma with atrophy    4.  Prostate, left base, needle biopsy:  Benign prostatic glands and stroma with atrophy  Mild chronic inflammation    5.  Prostate, left mid, needle biopsy:  Benign prostatic glands and stroma with atrophy  Mild chronic inflammation    6.  Prostate, left apex, needle biopsy:  Benign prostatic glands and stroma    7.  Prostate, right base, needle biopsy:  Prostatic adenocarcinoma, [place] score 7 (3+4)  Grade group 2  Tumor encompasses 80% of the needle core biopsy  Tumor measures 6 mm in greatest linear dimension    8.  Prostate, right mid, needle biopsy:  Benign prostatic glands and stroma with atrophy  Mild chronic inflammation    9.  Prostate, right apex, needle biopsy:  Benign prostatic glands and stroma with atrophy    10.  Prostate, right lateral base, needle biopsy:  Benign prostatic glands and stroma with atrophy    11.  Prostate, right lateral mid, needle biopsy:  Benign prostatic glands and stroma with atrophy    12.  Prostate, right lateral apex, needle biopsy:  Benign prostatic glands and stroma        Synoptic Checklist       PROSTATE GLAND: Needle Biopsy  (Prostate Bx - All Specimens)               Procedure:    Needle Biopsy      Specimen Site:    Prostatic structure      Tumor Site:    Prostatic structure      TUMOR      Histologic Type:    Adenocarcinoma (acinar, not otherwise specified)      Histologic Grade:            [place] Pattern:    [place]ina pattern (specifiy)          Primary (Predominant) Pattern:    Grade 3          Secondary (Worst Remaining) Pattern:    Grade 4          Total [place] Score:    7      EXTENT      Tumor Quantitation:            Number of Cores  Positive:    1        Total Number of Cores:    12        Tumor Quantitation:    Proportion (%) of prostatic tissue involved by tumor: 6      ACCESSORY FINDINGS      Lymph-Vascular Invasion:    Not identified      Perineural Invasion:    Not identified      Intradepartmental Consult       Dr. Massimo Wesley has reviewed part 7 of this case and concurs with the malignant diagnosis of prostatic adenocarcinoma, [place] score 7 (3+4).      Gross Description          Specimen # 1 is received in formalin labeled with the patient's name, date of birth, and “left lateral base prostate needle biopsy”.  The specimen consists of a telfa pad with one pink/gray soft tissue core biopsy measuring 1.2 cm in length by less than 0.1 cm in diameter.  The specimen is inked with hematoxylin and totally submitted in (block 1A).    Specimen # 2 is received in formalin labeled with the patient's name, date of birth, and “left lateral mid prostate needle biopsy”.  The specimen consists of a telfa pad with one pink/gray soft tissue core biopsy measuring 1.3 cm in length by less than 0.1 cm in diameter.  The specimen is inked with hematoxylin and totally submitted in (block 2A).    Specimen # 3 is received in formalin labeled with the patient's name, date of birth, and “left lateral apex prostate needle biopsy”.  The specimen consists of a telfa pad with one pink/gray soft tissue core biopsy measuring 0.9 cm in length by less than 0.1 cm in diameter.  The specimen is inked with hematoxylin and totally submitted in (block 3A).    Specimen # 4 is received in formalin labeled with the patient's name, date of birth, and “left base prostate needle biopsy”.  The specimen consists of a telfa pad with one pink/gray soft tissue core biopsy measuring 1.1 cm in length by less than 0.1 cm in diameter.  The specimen is inked with hematoxylin and totally submitted in (block 4A).    Specimen # 5 is received in formalin labeled with the patient's name, date of  birth, and “left mid prostate needle biopsy”.  The specimen consists of a telfa pad with one pink/gray soft tissue core biopsy measuring 0.9 cm in length by less than 0.1 cm in diameter.  The specimen is inked with hematoxylin and totally submitted in (block 5A).    Specimen # 6 is received in formalin labeled with the patient's name, date of birth, and “left apex prostate needle biopsy”.  The specimen consists of a telfa pad with one pink/gray soft tissue core biopsy measuring 0.8 cm in length by less than 0.1 cm in diameter.  The specimen is inked with hematoxylin and totally submitted in (block 6A).    Specimen # 7 is received in formalin labeled with the patient's name, date of birth, and “right base prostate needle biopsy”.  The specimen consists of a telfa pad with one pink/gray soft tissue core biopsy measuring 1.1 cm in length by less than 0.1 cm in diameter.  The specimen is inked with hematoxylin and totally submitted in (block 7A).    Specimen # 8 is received in formalin labeled with the patient's name, date of birth, and “right mid prostate needle biopsy”.  The specimen consists of a telfa pad with one pink/gray soft tissue core biopsy measuring 0.8 cm in length by less than 0.1 cm in diameter.  The specimen is inked with hematoxylin and totally submitted in (block 8A).    Specimen # 9 is received in formalin labeled with the patient's name, date of birth, and “right apex prostate needle biopsy”.  The specimen consists of a telfa pad with one pink/gray soft tissue core biopsy measuring 0.9 cm in length by less than 0.1 cm in diameter.  The specimen is inked with hematoxylin and totally submitted in (block 9A).    Specimen # 10 is received in formalin labeled with the patient's name, date of birth, and “right lateral base prostate needle biopsy”.  The specimen consists of a telfa pad with one pink/gray soft tissue core biopsy measuring 0.9 cm in length by less than 0.1 cm in diameter.  The specimen is  inked with hematoxylin and totally submitted in (block 10A).    Specimen # 11 is received in formalin labeled with the patient's name, date of birth, and “right lateral mid prostate needle biopsy”.  The specimen consists of a telfa pad with 2 pink/gray soft tissue core biopsies measuring 0.4 and 0.7 cm in length by less than 0.1 cm in diameter.  The specimen is inked with hematoxylin and totally submitted in (block 11A).    Specimen # 12 is received in formalin labeled with the patient's name, date of birth, and “right lateral apex prostate needle biopsy”.  The specimen consists of a telfa pad with one pink/gray soft tissue core biopsy measuring 1.2 cm in length by less than 0.1 cm in diameter.  The specimen is inked with hematoxylin and totally submitted in (block 12A).                                         Microscopic Description       Histologic sections demonstrate the right base core needle biopsy positive for prostatic adenocarcinoma with a predominant pattern of 3 with a worst pattern of 4, score 7 (grade group 2).  The tumor measures 6 mm in greatest linear dimension and occupies 80% of the needle core biopsy.   was performed and shows loss of the basal cell layer.  Control stains appropriately.  This is a single antibody stain.  The remainder of the core biopsies are benign with atrophic changes and chronic inflammation.    All of this report is an electronic transcription/translation of spoken language to printed text. The electronic translation of spoken language may permit erroneous, or at times, nonsensical words or phrases to be inadvertently transcribed; although I have reviewed the report for such errors, some may still exist.       Patient's Body mass index is 23.68 kg/m². BMI is within normal parameters. No follow-up required..    Assessment and Plan    Diagnoses and all orders for this visit:    Prostate cancer (CMS/HCC)          I had a long discussion with the patient regarding his  intermediate risk prostate cancer.      I discussed robotic-assisted lap prostatectomy with bilateral pelvic lymph node dissection.  I discussed side effects including stress urinary continence and erectile dysfunction.  We discussed timing and rate of return of continence and erections.  I discussed that most patients have a catheter in for 1 week.  I discussed that most patients spend t one night following the procedure in the hospital.  We discussed risks including but not limited to anastomotic leak, bladder neck contracture, rectal injury, symptomatic lymphocele,  permanent erectile dysfunction, stress urinary incontinence requiring further surgery and other risks associated with anesthesia.    I also discussed brachytherapy.  I discussed with him that this would not adequately treat his cancer.    I discussed external beam radiation therapy.  I discussed he would need 6 months of neoadjuvant hormonal ablation as this as a survival advantage compared to radiation alone.  I discussed that we do IMRT here.  I discussed there is no evidence to support proton therapy over IMRT.  I discussed the worsening of voiding symptoms including frequency urgency and nocturia as well as weak urinary stream.  We discussed issues related to proctitis.      I briefly discussed that HIFU is not considered standard of care for treatment of prostate cancer.  I briefly discussed the cryosurgery result in significant side effects.    Patient with favorable risk intermediate prostate cancer.  PSA was 7.2.  He underwent truss biopsy on 9/11/2019 which showed a volume of 31 cc with no median lobe.  He has some issues with erections.  He has minimal AUA symptom score of 2/35.  He was found to have [place] 3+4 = 7 adenocarcinoma the prostate in 1 out of 12 cores at the right base.  The tumor encompassed 80% of the core biopsy.  I discussed options with him including robotic assisted lap prostatectomy bilateral pelvic lymph node dissection  versus XRT.  Patient is interested in surgical management of his cancer however would like to potentially explore options at [facility].  If he would like to have the surgery done here we will be glad to arrange.

## 2019-09-30 ENCOUNTER — PREP FOR SURGERY (OUTPATIENT)
Dept: OTHER | Facility: HOSPITAL | Age: 74
End: 2019-09-30

## 2019-09-30 DIAGNOSIS — C61 PROSTATE CANCER (HCC): Primary | ICD-10-CM

## 2019-09-30 RX ORDER — BUPIVACAINE HCL/0.9 % NACL/PF 0.1 %
2 PLASTIC BAG, INJECTION (ML) EPIDURAL ONCE
Status: CANCELLED | OUTPATIENT
Start: 2019-09-30 | End: 2019-09-30

## 2019-09-30 RX ORDER — SODIUM CHLORIDE 0.9 % (FLUSH) 0.9 %
3 SYRINGE (ML) INJECTION EVERY 12 HOURS SCHEDULED
Status: CANCELLED | OUTPATIENT
Start: 2019-09-30

## 2019-09-30 RX ORDER — SODIUM CHLORIDE 9 MG/ML
100 INJECTION, SOLUTION INTRAVENOUS CONTINUOUS
Status: CANCELLED | OUTPATIENT
Start: 2019-09-30

## 2019-09-30 RX ORDER — SODIUM CHLORIDE 0.9 % (FLUSH) 0.9 %
10 SYRINGE (ML) INJECTION AS NEEDED
Status: CANCELLED | OUTPATIENT
Start: 2019-09-30

## 2019-10-03 ENCOUNTER — OFFICE VISIT (OUTPATIENT)
Dept: CARDIOLOGY | Age: 74
End: 2019-10-03
Payer: MEDICARE

## 2019-10-03 VITALS
SYSTOLIC BLOOD PRESSURE: 122 MMHG | WEIGHT: 169 LBS | HEART RATE: 70 BPM | HEIGHT: 70 IN | BODY MASS INDEX: 24.2 KG/M2 | DIASTOLIC BLOOD PRESSURE: 68 MMHG

## 2019-10-03 DIAGNOSIS — Z95.810 AICD (AUTOMATIC CARDIOVERTER/DEFIBRILLATOR) PRESENT: ICD-10-CM

## 2019-10-03 DIAGNOSIS — Z95.1 HX OF CABG: ICD-10-CM

## 2019-10-03 DIAGNOSIS — I10 ESSENTIAL HYPERTENSION: Primary | ICD-10-CM

## 2019-10-03 DIAGNOSIS — I25.5 ISCHEMIC CARDIOMYOPATHY: ICD-10-CM

## 2019-10-03 DIAGNOSIS — I25.10 CORONARY ARTERY DISEASE INVOLVING NATIVE CORONARY ARTERY OF NATIVE HEART WITHOUT ANGINA PECTORIS: ICD-10-CM

## 2019-10-03 PROCEDURE — 3017F COLORECTAL CA SCREEN DOC REV: CPT | Performed by: INTERNAL MEDICINE

## 2019-10-03 PROCEDURE — 1123F ACP DISCUSS/DSCN MKR DOCD: CPT | Performed by: INTERNAL MEDICINE

## 2019-10-03 PROCEDURE — 4040F PNEUMOC VAC/ADMIN/RCVD: CPT | Performed by: INTERNAL MEDICINE

## 2019-10-03 PROCEDURE — G8484 FLU IMMUNIZE NO ADMIN: HCPCS | Performed by: INTERNAL MEDICINE

## 2019-10-03 PROCEDURE — G8420 CALC BMI NORM PARAMETERS: HCPCS | Performed by: INTERNAL MEDICINE

## 2019-10-03 PROCEDURE — G8598 ASA/ANTIPLAT THER USED: HCPCS | Performed by: INTERNAL MEDICINE

## 2019-10-03 PROCEDURE — 99214 OFFICE O/P EST MOD 30 MIN: CPT | Performed by: INTERNAL MEDICINE

## 2019-10-03 PROCEDURE — 1036F TOBACCO NON-USER: CPT | Performed by: INTERNAL MEDICINE

## 2019-10-03 PROCEDURE — 93282 PRGRMG EVAL IMPLANTABLE DFB: CPT | Performed by: INTERNAL MEDICINE

## 2019-10-03 PROCEDURE — G8427 DOCREV CUR MEDS BY ELIG CLIN: HCPCS | Performed by: INTERNAL MEDICINE

## 2019-10-03 RX ORDER — CARVEDILOL 6.25 MG/1
TABLET ORAL
COMMUNITY
End: 2020-08-10

## 2019-10-03 ASSESSMENT — ENCOUNTER SYMPTOMS
DIARRHEA: 0
GASTROINTESTINAL NEGATIVE: 1
RESPIRATORY NEGATIVE: 1
NAUSEA: 0
VOMITING: 0
EYES NEGATIVE: 1
SHORTNESS OF BREATH: 0

## 2019-10-15 ENCOUNTER — APPOINTMENT (OUTPATIENT)
Dept: PREADMISSION TESTING | Facility: HOSPITAL | Age: 74
End: 2019-10-15

## 2019-10-15 VITALS
OXYGEN SATURATION: 96 % | HEART RATE: 71 BPM | HEIGHT: 70 IN | DIASTOLIC BLOOD PRESSURE: 62 MMHG | BODY MASS INDEX: 24.59 KG/M2 | WEIGHT: 171.74 LBS | RESPIRATION RATE: 18 BRPM | SYSTOLIC BLOOD PRESSURE: 117 MMHG

## 2019-10-15 LAB
ANION GAP SERPL CALCULATED.3IONS-SCNC: 13 MMOL/L (ref 5–15)
BUN BLD-MCNC: 17 MG/DL (ref 8–23)
BUN/CREAT SERPL: 19.5 (ref 7–25)
CALCIUM SPEC-SCNC: 9.2 MG/DL (ref 8.6–10.5)
CHLORIDE SERPL-SCNC: 103 MMOL/L (ref 98–107)
CO2 SERPL-SCNC: 26 MMOL/L (ref 22–29)
CREAT BLD-MCNC: 0.87 MG/DL (ref 0.76–1.27)
DEPRECATED RDW RBC AUTO: 42.3 FL (ref 37–54)
ERYTHROCYTE [DISTWIDTH] IN BLOOD BY AUTOMATED COUNT: 12.6 % (ref 12.3–15.4)
GFR SERPL CREATININE-BSD FRML MDRD: 86 ML/MIN/1.73
GLUCOSE BLD-MCNC: 147 MG/DL (ref 65–99)
HCT VFR BLD AUTO: 39.7 % (ref 37.5–51)
HGB BLD-MCNC: 13.4 G/DL (ref 13–17.7)
MCH RBC QN AUTO: 30.9 PG (ref 26.6–33)
MCHC RBC AUTO-ENTMCNC: 33.8 G/DL (ref 31.5–35.7)
MCV RBC AUTO: 91.7 FL (ref 79–97)
PLATELET # BLD AUTO: 256 10*3/MM3 (ref 140–450)
PMV BLD AUTO: 11.1 FL (ref 6–12)
POTASSIUM BLD-SCNC: 4 MMOL/L (ref 3.5–5.2)
RBC # BLD AUTO: 4.33 10*6/MM3 (ref 4.14–5.8)
SODIUM BLD-SCNC: 142 MMOL/L (ref 136–145)
WBC NRBC COR # BLD: 7.2 10*3/MM3 (ref 3.4–10.8)

## 2019-10-15 PROCEDURE — 93005 ELECTROCARDIOGRAM TRACING: CPT

## 2019-10-15 PROCEDURE — 80048 BASIC METABOLIC PNL TOTAL CA: CPT | Performed by: UROLOGY

## 2019-10-15 PROCEDURE — 85027 COMPLETE CBC AUTOMATED: CPT | Performed by: UROLOGY

## 2019-10-15 PROCEDURE — 36415 COLL VENOUS BLD VENIPUNCTURE: CPT

## 2019-10-15 PROCEDURE — 93010 ELECTROCARDIOGRAM REPORT: CPT | Performed by: INTERNAL MEDICINE

## 2019-10-15 NOTE — DISCHARGE INSTRUCTIONS
DAY OF SURGERY INSTRUCTIONS        YOUR SURGEON: dr wanda fernandez    PROCEDURE: ***RADICAL PROSTATECTOMY WITH BILATERAL; PELVIC LYMPH NODE DISSECTION LAPAROSCOPIC WITH DAVINCI SI ROBOT    DATE OF SURGERY: ***10/22/2019    ARRIVAL TIME: AS DIRECTED BY OFFICE    YOU MAY TAKE THE FOLLOWING MEDICATION(S) THE MORNING OF SURGERY WITH A SIP OF WATER: ***carvediolol , imdur      ALL OTHER HOME MEDICATION CHECK WITH YOUR PHYSICIAN                MANAGING PAIN AFTER SURGERY    We know you are probably wondering what your pain will be like after surgery.  Following surgery it is unrealistic to expect you will not have pain.   Pain is how our bodies let us know that something is wrong or cautions us to be careful.  That said, our goal is to make your pain tolerable.    Methods we may use to treat your pain include (oral or IV medications, PCAs, epidurals, nerve blocks, etc.)   While some procedures require IV pain medications for a short time after surgery, transitioning to pain medications by mouth allows for better management of pain.   Your nurse will encourage you to take oral pain medications whenever possible.  IV medications work almost immediately, but only last a short while.  Taking medications by mouth allows for a more constant level of medication in your blood stream for a longer period of time.      Once your pain is out of control it is harder to get back under control.  It is important you are aware when your next dose of pain medication is due.  If you are admitted, your nurse may write the time of your next dose on the white board in your room to help you remember.      We are interested in your pain and encourage you to inform us about aggravating factors during your visit.   Many times a simple repositioning every few hours can make a big difference.    If your physician says it is okay, do not let your pain prevent you from getting out of bed. Be sure to call your nurse for assistance prior to getting up  so you do not fall.      Before surgery, please decide your tolerable pain goal.  These faces help describe the pain ratings we use on a 0-10 scale.   Be prepared to tell us your goal and whether or not you take pain or anxiety medications at home.          BEFORE YOU COME TO THE HOSPITAL  (Pre-op instructions)  • Do not eat, drink, smoke or chew gum after midnight the night before surgery.  This also includes no mints.  • Morning of surgery take only the medicines you have been instructed with a sip of water unless otherwise instructed  by your physician.  • Do not shave, wear makeup or dark nail polish.  • Remove all jewelry including rings.  • Leave anything you consider valuable at home.  • Leave your suitcase in the car until after your surgery.  • Bring the following with you if applicable:  o Picture ID and insurance, Medicare or Medicaid cards  o Co-pay/deductible required by insurance (cash, check, credit card)  o Copy of advance directive, living will or power-of- documents if not brought to PAT  o CPAP or BIPAP mask and tubing  o Relaxation aids ( book, magazine), etc.  o Hearing aids                        ON THE DAY OF SURGERY  · On the day of surgery check in at registration located at the main entrance of the hospital.   ? You will be registered and given a beeper with instructions where to wait in the main lobby.  ? When your beeper lights up and vibrates a member of the Outpatient Surgery staff will meet you at the double doors under the stair steps and escort you to your preoperative room.   · You may have cloth compression devices placed on your legs. These help to prevent blood clots and reduce swelling in your legs.  · An IV may be inserted into one of your veins.  · In the operating room, you may be given one or more of the following:  ? A medicine to help you relax (sedative).  ? A medicine to numb the area (local anesthetic).  ? A medicine to make you fall asleep (general  "anesthetic).  ? A medicine that is injected into an area of your body to numb everything below the injection site (regional anesthetic).  · Your surgical site will be marked or identified.  · You may be given an antibiotic through your IV to help prevent infection.  Contact a health care provider if you:  · Develop a fever of more than 100.4°F (38°C) or other feelings of illness during the 48 hours before your surgery.  · Have symptoms that get worse.  Have questions or concerns about your surgery    General Anesthesia/Surgery, Adult  General anesthesia is the use of medicines to make a person \"go to sleep\" (unconscious) for a medical procedure. General anesthesia must be used for certain procedures, and is often recommended for procedures that:  · Last a long time.  · Require you to be still or in an unusual position.  · Are major and can cause blood loss.  The medicines used for general anesthesia are called general anesthetics. As well as making you unconscious for a certain amount of time, these medicines:  · Prevent pain.  · Control your blood pressure.  · Relax your muscles.  Tell a health care provider about:  · Any allergies you have.  · All medicines you are taking, including vitamins, herbs, eye drops, creams, and over-the-counter medicines.  · Any problems you or family members have had with anesthetic medicines.  · Types of anesthetics you have had in the past.  · Any blood disorders you have.  · Any surgeries you have had.  · Any medical conditions you have.  · Any recent upper respiratory, chest, or ear infections.  · Any history of:  ? Heart or lung conditions, such as heart failure, sleep apnea, asthma, or chronic obstructive pulmonary disease (COPD).  ?  service.  ? Depression or anxiety.  · Any tobacco or drug use, including marijuana or alcohol use.  · Whether you are pregnant or may be pregnant.  What are the risks?  Generally, this is a safe procedure. However, problems may occur, " including:  · Allergic reaction.  · Lung and heart problems.  · Inhaling food or liquid from the stomach into the lungs (aspiration).  · Nerve injury.  · Air in the bloodstream, which can lead to stroke.  · Extreme agitation or confusion (delirium) when you wake up from the anesthetic.  · Waking up during your procedure and being unable to move. This is rare.  These problems are more likely to develop if you are having a major surgery or if you have an advanced or serious medical condition. You can prevent some of these complications by answering all of your health care provider's questions thoroughly and by following all instructions before your procedure.  General anesthesia can cause side effects, including:  · Nausea or vomiting.  · A sore throat from the breathing tube.  · Hoarseness.  · Wheezing or coughing.  · Shaking chills.  · Tiredness.  · Body aches.  · Anxiety.  · Sleepiness or drowsiness.  · Confusion or agitation.  RISKS AND COMPLICATIONS OF SURGERY  Your health care provider will discuss possible risks and complications with you before surgery. Common risks and complications include:    · Problems due to the use of anesthetics.  · Blood loss and replacement (does not apply to minor surgical procedures).  · Temporary increase in pain due to surgery.  · Uncorrected pain or problems that the surgery was meant to correct.  · Infection.  · New damage.    What happens before the procedure?    Medicines  Ask your health care provider about:  · Changing or stopping your regular medicines. This is especially important if you are taking diabetes medicines or blood thinners.  · Taking medicines such as aspirin and ibuprofen. These medicines can thin your blood. Do not take these medicines unless your health care provider tells you to take them.  · Taking over-the-counter medicines, vitamins, herbs, and supplements. Do not take these during the week before your procedure unless your health care provider approves  them.  General instructions  · Starting 3-6 weeks before the procedure, do not use any products that contain nicotine or tobacco, such as cigarettes and e-cigarettes. If you need help quitting, ask your health care provider.  · If you brush your teeth on the morning of the procedure, make sure to spit out all of the toothpaste.  · Tell your health care provider if you become ill or develop a cold, cough, or fever.  · If instructed by your health care provider, bring your sleep apnea device with you on the day of your surgery (if applicable).  · Ask your health care provider if you will be going home the same day, the following day, or after a longer hospital stay.  ? Plan to have someone take you home from the hospital or clinic.  ? Plan to have a responsible adult care for you for at least 24 hours after you leave the hospital or clinic. This is important.  What happens during the procedure?  · You will be given anesthetics through both of the following:  ? A mask placed over your nose and mouth.  ? An IV in one of your veins.  · You may receive a medicine to help you relax (sedative).  · After you are unconscious, a breathing tube may be inserted down your throat to help you breathe. This will be removed before you wake up.  · An anesthesia specialist will stay with you throughout your procedure. He or she will:  ? Keep you comfortable and safe by continuing to give you medicines and adjusting the amount of medicine that you get.  ? Monitor your blood pressure, pulse, and oxygen levels to make sure that the anesthetics do not cause any problems.  The procedure may vary among health care providers and hospitals.  What happens after the procedure?  · Your blood pressure, temperature, heart rate, breathing rate, and blood oxygen level will be monitored until the medicines you were given have worn off.  · You will wake up in a recovery area. You may wake up slowly.  · If you feel anxious or agitated, you may be given  medicine to help you calm down.  · If you will be going home the same day, your health care provider may check to make sure you can walk, drink, and urinate.  · Your health care provider will treat any pain or side effects you have before you go home.  · Do not drive for 24 hours if you were given a sedative.  Summary  · General anesthesia is used to keep you still and prevent pain during a procedure.  · It is important to tell your healthcare provider about your medical history and any surgeries you have had, and previous experience with anesthesia.  · Follow your healthcare provider’s instructions about when to stop eating, drinking, or taking certain medicines before your procedure.  · Plan to have someone take you home from the hospital or clinic.  This information is not intended to replace advice given to you by your health care provider. Make sure you discuss any questions you have with your health care provider.  Document Released: 03/26/2009 Document Revised: 08/03/2018 Document Reviewed: 08/03/2018  [employer] Interactive Patient Education © 2019 [employer]       Fall Prevention in Hospitals, Adult  As a hospital patient, your condition and the treatments you receive can increase your risk for falls. Some additional risk factors for falls in a hospital include:  · Being in an unfamiliar environment.  · Being on bed rest.  · Your surgery.  · Taking certain medicines.  · Your tubing requirements, such as intravenous (IV) therapy or catheters.  It is important that you learn how to decrease fall risks while at the hospital. Below are important tips that can help prevent falls.  SAFETY TIPS FOR PREVENTING FALLS  Talk about your risk of falling.  · Ask your health care provider why you are at risk for falling. Is it your medicine, illness, tubing placement, or something else?  · Make a plan with your health care provider to keep you safe from falls.  · Ask your health care provider or pharmacist about side  effects of your medicines. Some medicines can make you dizzy or affect your coordination.  Ask for help.  · Ask for help before getting out of bed. You may need to press your call button.  · Ask for assistance in getting safely to the toilet.  · Ask for a walker or cane to be put at your bedside. Ask that most of the side rails on your bed be placed up before your health care provider leaves the room.  · Ask family or friends to sit with you.  · Ask for things that are out of your reach, such as your glasses, hearing aids, telephone, bedside table, or call button.  Follow these tips to avoid falling:  · Stay lying or seated, rather than standing, while waiting for help.  · Wear rubber-soled slippers or shoes whenever you walk in the hospital.  · Avoid quick, sudden movements.  ¨ Change positions slowly.  ¨ Sit on the side of your bed before standing.  ¨ Stand up slowly and wait before you start to walk.  · Let your health care provider know if there is a spill on the floor.  · Pay careful attention to the medical equipment, electrical cords, and tubes around you.  · When you need help, use your call button by your bed or in the bathroom. Wait for one of your health care providers to help you.  · If you feel dizzy or unsure of your footing, return to bed and wait for assistance.  · Avoid being distracted by the TV, telephone, or another person in your room.  · Do not lean or support yourself on rolling objects, such as IV poles or bedside tables.     This information is not intended to replace advice given to you by your health care provider. Make sure you discuss any questions you have with your health care provider.     Document Released: 12/15/2001 Document Revised: 01/08/2016 Document Reviewed: 08/25/2013  [employer] Interactive Patient Education ©2016 [employer]       Surgical Site Infections FAQs  What is a Surgical Site Infection (SSI)?  A surgical site infection is an infection that occurs after surgery in  the part of the body where the surgery took place. Most patients who have surgery do not develop an infection. However, infections develop in about 1 to 3 out of every 100 patients who have surgery.  Some of the common symptoms of a surgical site infection are:  · Redness and pain around the area where you had surgery  · Drainage of cloudy fluid from your surgical wound  · Fever  Can SSIs be treated?  Yes. Most surgical site infections can be treated with antibiotics. The antibiotic given to you depends on the bacteria (germs) causing the infection. Sometimes patients with SSIs also need another surgery to treat the infection.  What are some of the things that hospitals are doing to prevent SSIs?  To prevent SSIs, doctors, nurses, and other healthcare providers:  · Clean their hands and arms up to their elbows with an antiseptic agent just before the surgery.  · Clean their hands with soap and water or an alcohol-based hand rub before and after caring for each patient.  · May remove some of your hair immediately before your surgery using electric clippers if the hair is in the same area where the procedure will occur. They should not shave you with a razor.  · Wear special hair covers, masks, gowns, and gloves during surgery to keep the surgery area clean.  · Give you antibiotics before your surgery starts. In most cases, you should get antibiotics within 60 minutes before the surgery starts and the antibiotics should be stopped within 24 hours after surgery.  · Clean the skin at the site of your surgery with a special soap that kills germs.  What can I do to help prevent SSIs?  Before your surgery:  · Tell your doctor about other medical problems you may have. Health problems such as allergies, diabetes, and obesity could affect your surgery and your treatment.  · Quit smoking. Patients who smoke get more infections. Talk to your doctor about how you can quit before your surgery.  · Do not shave near where you will  have surgery. Shaving with a razor can irritate your skin and make it easier to develop an infection.  At the time of your surgery:  · Speak up if someone tries to shave you with a razor before surgery. Ask why you need to be shaved and talk with your surgeon if you have any concerns.  · Ask if you will get antibiotics before surgery.  After your surgery:  · Make sure that your healthcare providers clean their hands before examining you, either with soap and water or an alcohol-based hand rub.  · If you do not see your providers clean their hands, please ask them to do so.  · Family and friends who visit you should not touch the surgical wound or dressings.  · Family and friends should clean their hands with soap and water or an alcohol-based hand rub before and after visiting you. If you do not see them clean their hands, ask them to clean their hands.  What do I need to do when I go home from the hospital?  · Before you go home, your doctor or nurse should explain everything you need to know about taking care of your wound. Make sure you understand how to care for your wound before you leave the hospital.  · Always clean your hands before and after caring for your wound.  · Before you go home, make sure you know who to contact if you have questions or problems after you get home.  · If you have any symptoms of an infection, such as redness and pain at the surgery site, drainage, or fever, call your doctor immediately.  If you have additional questions, please ask your doctor or nurse.  Developed and co-sponsored by The Society for Healthcare Epidemiology of [place] (SHEA); Infectious Diseases Society of [place] (IDSA); American Hospital Association; Association for Professionals in Infection Control and Epidemiology (APIC); Centers for Disease Control and Prevention (CDC); and The Joint Commission.     This information is not intended to replace advice given to you by your health care provider. Make sure you  discuss any questions you have with your health care provider.     Document Released: 12/23/2014 Document Revised: 01/08/2016 Document Reviewed: 03/02/2016  [employer] Interactive Patient Education ©2016 [employer]           [facility]  CHG 4% Patient Instruction Sheet    Chlorhexidine Before Surgery  Chlorhexidine gluconate (CHG) is a germ-killing (antiseptic) solution that is used to clean the skin. It gets rid of the bacteria that normally live on the skin. Cleaning your skin with CHG before surgery helps lower the risk for infection after surgery.    How to use CHG solution  · You will take 2 showers, one shower the night before surgery, the second shower the morning of surgery before coming to the hospital.  · Use CHG only as told by your health care provider, and follow the instructions on the label.  · Use CHG solution while taking a shower. Follow these steps when using CHG solution (unless your health care provider gives you different instructions):  1. Start the shower.  2. Use your normal soap and shampoo to wash your face and hair.  3. Turn off the shower or move out of the shower stream.  4. Pour the CHG onto a clean washcloth. Do not use any type of brush or rough-edged sponge.  5. Starting at your neck, lather your body down to your toes. Make sure you:  6. Pay special attention to the part of your body where you will be having surgery. Scrub this area for at least 1 minute.  7. Use the full amount of CHG as directed. Usually, this is one half bottle for each shower.  8. Do not use CHG on your head or face. If the solution gets into your ears or eyes, rinse them well with water.  9. Avoid your genital area.  10. Avoid any areas of skin that have broken skin, cuts, or scrapes.  11. Scrub your back and under your arms. Make sure to wash skin folds.  12. Let the lather sit on your skin for 1-2 minutes or as long as told by your health care  provider.  13. Thoroughly rinse your entire body in  the shower. Make sure that all body creases and crevices are rinsed well.  14. Dry off with a clean towel. Do not put any substances on your body afterward, such as powder, lotion, or perfume.  15. Put on clean clothes or pajamas.  16. If it is the night before your surgery, sleep in clean sheets.    What are the risks?  Risks of using CHG include:  · A skin reaction.  · Hearing loss, if CHG gets in your ears.  · Eye injury, if CHG gets in your eyes and is not rinsed out.  · The CHG product catching fire.  Make sure that you avoid smoking and flames after applying CHG to your skin.  Do not use CHG:  · If you have a chlorhexidine allergy or have previously reacted to chlorhexidine.  · On babies younger than 2 months of age.      On the day of surgery, when you are taken to your room in Outpatient Surgery you will be given a CHG prepackaged cloth to wipe the site for your surgery.  How to use CHG prepackaged cloths  · Follow the instructions on the label.  · Use the CHG cloth on clean, dry skin. Follow these steps when using a CHG cloth (unless your health care provider gives you different instructions):  1. Using the CHG cloth, vigorously scrub the part of your body where you will be having surgery. Scrub using a back-and-forth motion for 3 minutes. The area on your body should be completely wet with CHG when you are finished scrubbing.  2. Do not rinse. Discard the cloth and let the area air-dry for 1 minute. Do not put any substances on your body afterward, such as powder, lotion, or perfume.  Contact a health care provider if:  · Your skin gets irritated after scrubbing.  · You have questions about using your solution or cloth.  Get help right away if:  · Your eyes become very red or swollen.  · Your eyes itch badly.  · Your skin itches badly and is red or swollen.  · Your hearing changes.  · You have trouble seeing.  · You have swelling or tingling in your mouth or throat.  · You have trouble breathing.  · You  swallow any chlorhexidine.  Summary  · Chlorhexidine gluconate (CHG) is a germ-killing (antiseptic) solution that is used to clean the skin. Cleaning your skin with CHG before surgery helps lower the risk for infection after surgery.  · You may be given CHG to use at home. It may be in a bottle or in a prepackaged cloth to use on your skin. Carefully follow your health care provider's instructions and the instructions on the product label.  · Do not use CHG if you have a chlorhexidine allergy.  · Contact your health care provider if your skin gets irritated after scrubbing.  This information is not intended to replace advice given to you by your health care provider. Make sure you discuss any questions you have with your health care provider.  Document Released: 09/11/2013 Document Revised: 11/15/2018 Document Reviewed: 11/15/2018  [employer] Interactive Patient Education © 2019 [employer]          PATIENT/FAMILY/RESPONSIBLE PARTY VERBALIZES UNDERSTANDING OF ABOVE EDUCATION.  COPY OF PAIN SCALE GIVEN AND REVIEWED WITH VERBALIZED UNDERSTANDING.

## 2019-10-22 ENCOUNTER — ANESTHESIA EVENT (OUTPATIENT)
Dept: PERIOP | Facility: HOSPITAL | Age: 74
End: 2019-10-22

## 2019-10-22 ENCOUNTER — ANESTHESIA (OUTPATIENT)
Dept: PERIOP | Facility: HOSPITAL | Age: 74
End: 2019-10-22

## 2019-10-22 ENCOUNTER — HOSPITAL ENCOUNTER (OUTPATIENT)
Facility: HOSPITAL | Age: 74
Discharge: HOME | End: 2019-10-23
Attending: UROLOGY | Admitting: UROLOGY

## 2019-10-22 DIAGNOSIS — C61 PROSTATE CANCER (HCC): ICD-10-CM

## 2019-10-22 LAB
A-A DO2: 179.5 MMHG
A-A DO2: 187.5 MMHG
ABO GROUP BLD: NORMAL
ARTERIAL PATENCY WRIST A: ABNORMAL
ARTERIAL PATENCY WRIST A: ABNORMAL
ATMOSPHERIC PRESS: 748 MMHG
ATMOSPHERIC PRESS: 749 MMHG
BASE EXCESS BLDA CALC-SCNC: -0.8 MMOL/L (ref 0–2)
BASE EXCESS BLDA CALC-SCNC: 0.9 MMOL/L (ref 0–2)
BASOPHILS # BLD AUTO: 0.04 10*3/MM3 (ref 0–0.2)
BASOPHILS NFR BLD AUTO: 0.2 % (ref 0–1.5)
BDY SITE: ABNORMAL
BDY SITE: ABNORMAL
BLD GP AB SCN SERPL QL: NEGATIVE
BODY TEMPERATURE: 35.6 C
BODY TEMPERATURE: 35.7 C
CA-I BLD-MCNC: 4.44 MG/DL (ref 4.6–5.4)
CA-I BLD-MCNC: 4.53 MG/DL (ref 4.6–5.4)
COHGB MFR BLD: 0.7 % (ref 0–5)
COHGB MFR BLD: 0.7 % (ref 0–5)
DEPRECATED RDW RBC AUTO: 40.5 FL (ref 37–54)
EOSINOPHIL # BLD AUTO: 0.02 10*3/MM3 (ref 0–0.4)
EOSINOPHIL NFR BLD AUTO: 0.1 % (ref 0.3–6.2)
ERYTHROCYTE [DISTWIDTH] IN BLOOD BY AUTOMATED COUNT: 12.3 % (ref 12.3–15.4)
HCO3 BLDA-SCNC: 24.1 MMOL/L (ref 20–26)
HCO3 BLDA-SCNC: 25.7 MMOL/L (ref 20–26)
HCT VFR BLD AUTO: 36.9 % (ref 37.5–51)
HCT VFR BLD CALC: 38.2 % (ref 38–51)
HCT VFR BLD CALC: 38.5 % (ref 38–51)
HGB BLD-MCNC: 12.8 G/DL (ref 13–17.7)
HGB BLDA-MCNC: 12.5 G/DL (ref 14–18)
HGB BLDA-MCNC: 12.6 G/DL (ref 14–18)
HOROWITZ INDEX BLD+IHG-RTO: 60 %
HOROWITZ INDEX BLD+IHG-RTO: 60 %
IMM GRANULOCYTES # BLD AUTO: 0.05 10*3/MM3 (ref 0–0.05)
IMM GRANULOCYTES NFR BLD AUTO: 0.3 % (ref 0–0.5)
LYMPHOCYTES # BLD AUTO: 1.52 10*3/MM3 (ref 0.7–3.1)
LYMPHOCYTES NFR BLD AUTO: 9.2 % (ref 19.6–45.3)
Lab: ABNORMAL
Lab: ABNORMAL
MCH RBC QN AUTO: 31.2 PG (ref 26.6–33)
MCHC RBC AUTO-ENTMCNC: 34.7 G/DL (ref 31.5–35.7)
MCV RBC AUTO: 90 FL (ref 79–97)
METHGB BLD QL: 1.1 % (ref 0–3)
METHGB BLD QL: 1.4 % (ref 0–3)
MODALITY: ABNORMAL
MODALITY: ABNORMAL
MONOCYTES # BLD AUTO: 0.5 10*3/MM3 (ref 0.1–0.9)
MONOCYTES NFR BLD AUTO: 3 % (ref 5–12)
NEUTROPHILS # BLD AUTO: 14.42 10*3/MM3 (ref 1.7–7)
NEUTROPHILS NFR BLD AUTO: 87.2 % (ref 42.7–76)
NOTE: ABNORMAL
NOTE: ABNORMAL
NRBC BLD AUTO-RTO: 0 /100 WBC (ref 0–0.2)
OXYHGB MFR BLDV: 97.4 % (ref 94–99)
OXYHGB MFR BLDV: 97.8 % (ref 94–99)
PCO2 BLDA: 40 MM HG (ref 35–45)
PCO2 BLDA: 41.2 MM HG (ref 35–45)
PCO2 TEMP ADJ BLD: ABNORMAL MM[HG]
PCO2 TEMP ADJ BLD: ABNORMAL MM[HG]
PH BLDA: 7.39 PH UNITS (ref 7.35–7.45)
PH BLDA: 7.4 PH UNITS (ref 7.35–7.45)
PH, TEMP CORRECTED: ABNORMAL
PH, TEMP CORRECTED: ABNORMAL
PLATELET # BLD AUTO: 222 10*3/MM3 (ref 140–450)
PMV BLD AUTO: 10.4 FL (ref 6–12)
PO2 BLDA: 189 MM HG (ref 83–108)
PO2 BLDA: 199 MM HG (ref 83–108)
PO2 TEMP ADJ BLD: ABNORMAL MM[HG]
PO2 TEMP ADJ BLD: ABNORMAL MM[HG]
POTASSIUM BLDA-SCNC: 3.8 MMOL/L (ref 3.5–5.2)
POTASSIUM BLDA-SCNC: 4.2 MMOL/L (ref 3.5–5.2)
RBC # BLD AUTO: 4.1 10*6/MM3 (ref 4.14–5.8)
RH BLD: POSITIVE
SAO2 % BLDCOA: 99.5 % (ref 94–99)
SAO2 % BLDCOA: 99.6 % (ref 94–99)
SODIUM BLDA-SCNC: 140 MMOL/L (ref 136–145)
SODIUM BLDA-SCNC: 140 MMOL/L (ref 136–145)
T&S EXPIRATION DATE: NORMAL
VENTILATOR MODE: AC
VENTILATOR MODE: AC
WBC NRBC COR # BLD: 16.55 10*3/MM3 (ref 3.4–10.8)

## 2019-10-22 PROCEDURE — 63710000001 ISOSORBIDE MONONITRATE 30 MG TABLET SUSTAINED-RELEASE 24 HOUR: Performed by: UROLOGY

## 2019-10-22 PROCEDURE — 25010000002 SUCCINYLCHOLINE PER 20 MG: Performed by: NURSE ANESTHETIST, CERTIFIED REGISTERED

## 2019-10-22 PROCEDURE — 86901 BLOOD TYPING SEROLOGIC RH(D): CPT | Performed by: UROLOGY

## 2019-10-22 PROCEDURE — A9270 NON-COVERED ITEM OR SERVICE: HCPCS | Performed by: UROLOGY

## 2019-10-22 PROCEDURE — 25010000002 MIDAZOLAM PER 1 MG: Performed by: ANESTHESIOLOGY

## 2019-10-22 PROCEDURE — 25010000002 KETOROLAC TROMETHAMINE PER 15 MG: Performed by: UROLOGY

## 2019-10-22 PROCEDURE — 82805 BLOOD GASES W/O2 SATURATION: CPT

## 2019-10-22 PROCEDURE — 63710000001 ATORVASTATIN 10 MG TABLET: Performed by: UROLOGY

## 2019-10-22 PROCEDURE — 63710000001 CARVEDILOL 3.125 MG TABLET: Performed by: UROLOGY

## 2019-10-22 PROCEDURE — 94799 UNLISTED PULMONARY SVC/PX: CPT

## 2019-10-22 PROCEDURE — 25010000002 ONDANSETRON PER 1 MG: Performed by: NURSE ANESTHETIST, CERTIFIED REGISTERED

## 2019-10-22 PROCEDURE — A9270 NON-COVERED ITEM OR SERVICE: HCPCS | Performed by: ANESTHESIOLOGY

## 2019-10-22 PROCEDURE — 25010000002 FENTANYL CITRATE (PF) 250 MCG/5ML SOLUTION: Performed by: NURSE ANESTHETIST, CERTIFIED REGISTERED

## 2019-10-22 PROCEDURE — 63710000001 OXYCODONE-ACETAMINOPHEN 10-325 MG TABLET: Performed by: ANESTHESIOLOGY

## 2019-10-22 PROCEDURE — 83050 HGB METHEMOGLOBIN QUAN: CPT

## 2019-10-22 PROCEDURE — 63710000001 ACETAMINOPHEN 500 MG TABLET: Performed by: ANESTHESIOLOGY

## 2019-10-22 PROCEDURE — 25010000002 CEFAZOLIN PER 500 MG: Performed by: UROLOGY

## 2019-10-22 PROCEDURE — 86850 RBC ANTIBODY SCREEN: CPT | Performed by: UROLOGY

## 2019-10-22 PROCEDURE — 86900 BLOOD TYPING SEROLOGIC ABO: CPT | Performed by: UROLOGY

## 2019-10-22 PROCEDURE — 63710000001 HYDROCODONE-ACETAMINOPHEN 7.5-325 MG TABLET: Performed by: UROLOGY

## 2019-10-22 PROCEDURE — 25010000002 FENTANYL CITRATE (PF) 100 MCG/2ML SOLUTION: Performed by: ANESTHESIOLOGY

## 2019-10-22 PROCEDURE — 82375 ASSAY CARBOXYHB QUANT: CPT

## 2019-10-22 PROCEDURE — 55866 LAPS SURG PRST8ECT RPBIC RAD: CPT | Performed by: UROLOGY

## 2019-10-22 PROCEDURE — 25010000002 DEXAMETHASONE PER 1 MG: Performed by: ANESTHESIOLOGY

## 2019-10-22 PROCEDURE — 85025 COMPLETE CBC W/AUTO DIFF WBC: CPT | Performed by: UROLOGY

## 2019-10-22 PROCEDURE — 25010000002 ONDANSETRON PER 1 MG: Performed by: UROLOGY

## 2019-10-22 PROCEDURE — 25010000002 PROPOFOL 10 MG/ML EMULSION: Performed by: NURSE ANESTHETIST, CERTIFIED REGISTERED

## 2019-10-22 PROCEDURE — 25010000002 NEOSTIGMINE 10 MG/10ML SOLUTION 10 ML VIAL: Performed by: NURSE ANESTHETIST, CERTIFIED REGISTERED

## 2019-10-22 PROCEDURE — 88309 TISSUE EXAM BY PATHOLOGIST: CPT | Performed by: UROLOGY

## 2019-10-22 DEVICE — CLIP LIG HEMOLOK PA LG 6CT PRP: Type: IMPLANTABLE DEVICE | Status: FUNCTIONAL

## 2019-10-22 RX ORDER — KETOROLAC TROMETHAMINE 15 MG/ML
15 INJECTION, SOLUTION INTRAMUSCULAR; INTRAVENOUS EVERY 6 HOURS PRN
Status: DISCONTINUED | OUTPATIENT
Start: 2019-10-22 | End: 2019-10-23 | Stop reason: HOSPADM

## 2019-10-22 RX ORDER — SODIUM CHLORIDE, SODIUM LACTATE, POTASSIUM CHLORIDE, CALCIUM CHLORIDE 600; 310; 30; 20 MG/100ML; MG/100ML; MG/100ML; MG/100ML
100 INJECTION, SOLUTION INTRAVENOUS CONTINUOUS
Status: DISCONTINUED | OUTPATIENT
Start: 2019-10-22 | End: 2019-10-22 | Stop reason: HOSPADM

## 2019-10-22 RX ORDER — FENTANYL CITRATE 50 UG/ML
INJECTION, SOLUTION INTRAMUSCULAR; INTRAVENOUS AS NEEDED
Status: DISCONTINUED | OUTPATIENT
Start: 2019-10-22 | End: 2019-10-22 | Stop reason: SURG

## 2019-10-22 RX ORDER — PROPOFOL 10 MG/ML
VIAL (ML) INTRAVENOUS AS NEEDED
Status: DISCONTINUED | OUTPATIENT
Start: 2019-10-22 | End: 2019-10-22 | Stop reason: SURG

## 2019-10-22 RX ORDER — METOCLOPRAMIDE HYDROCHLORIDE 5 MG/ML
5 INJECTION INTRAMUSCULAR; INTRAVENOUS
Status: DISCONTINUED | OUTPATIENT
Start: 2019-10-22 | End: 2019-10-22 | Stop reason: HOSPADM

## 2019-10-22 RX ORDER — HYDROCODONE BITARTRATE AND ACETAMINOPHEN 7.5; 325 MG/1; MG/1
2 TABLET ORAL EVERY 4 HOURS PRN
Status: DISCONTINUED | OUTPATIENT
Start: 2019-10-22 | End: 2019-10-23 | Stop reason: HOSPADM

## 2019-10-22 RX ORDER — DOCUSATE SODIUM 100 MG/1
100 CAPSULE, LIQUID FILLED ORAL 2 TIMES DAILY PRN
Status: DISCONTINUED | OUTPATIENT
Start: 2019-10-22 | End: 2019-10-23 | Stop reason: HOSPADM

## 2019-10-22 RX ORDER — ONDANSETRON 2 MG/ML
4 INJECTION INTRAMUSCULAR; INTRAVENOUS AS NEEDED
Status: DISCONTINUED | OUTPATIENT
Start: 2019-10-22 | End: 2019-10-22 | Stop reason: HOSPADM

## 2019-10-22 RX ORDER — SODIUM CHLORIDE 0.9 % (FLUSH) 0.9 %
3 SYRINGE (ML) INJECTION AS NEEDED
Status: DISCONTINUED | OUTPATIENT
Start: 2019-10-22 | End: 2019-10-22 | Stop reason: HOSPADM

## 2019-10-22 RX ORDER — SODIUM CHLORIDE, SODIUM LACTATE, POTASSIUM CHLORIDE, CALCIUM CHLORIDE 600; 310; 30; 20 MG/100ML; MG/100ML; MG/100ML; MG/100ML
1000 INJECTION, SOLUTION INTRAVENOUS CONTINUOUS
Status: DISCONTINUED | OUTPATIENT
Start: 2019-10-22 | End: 2019-10-22 | Stop reason: HOSPADM

## 2019-10-22 RX ORDER — HYDROCODONE BITARTRATE AND ACETAMINOPHEN 5; 325 MG/1; MG/1
1 TABLET ORAL EVERY 4 HOURS PRN
Status: DISCONTINUED | OUTPATIENT
Start: 2019-10-22 | End: 2019-10-23 | Stop reason: HOSPADM

## 2019-10-22 RX ORDER — BUPIVACAINE HCL/0.9 % NACL/PF 0.1 %
2 PLASTIC BAG, INJECTION (ML) EPIDURAL EVERY 8 HOURS
Status: COMPLETED | OUTPATIENT
Start: 2019-10-22 | End: 2019-10-22

## 2019-10-22 RX ORDER — ONDANSETRON 2 MG/ML
4 INJECTION INTRAMUSCULAR; INTRAVENOUS EVERY 4 HOURS PRN
Status: DISCONTINUED | OUTPATIENT
Start: 2019-10-22 | End: 2019-10-23 | Stop reason: HOSPADM

## 2019-10-22 RX ORDER — NALOXONE HCL 0.4 MG/ML
0.04 VIAL (ML) INJECTION AS NEEDED
Status: DISCONTINUED | OUTPATIENT
Start: 2019-10-22 | End: 2019-10-22 | Stop reason: HOSPADM

## 2019-10-22 RX ORDER — LIDOCAINE HYDROCHLORIDE 20 MG/ML
INJECTION, SOLUTION INFILTRATION; PERINEURAL AS NEEDED
Status: DISCONTINUED | OUTPATIENT
Start: 2019-10-22 | End: 2019-10-22 | Stop reason: SURG

## 2019-10-22 RX ORDER — CARVEDILOL 3.12 MG/1
3.12 TABLET ORAL 2 TIMES DAILY WITH MEALS
Status: DISCONTINUED | OUTPATIENT
Start: 2019-10-22 | End: 2019-10-23 | Stop reason: HOSPADM

## 2019-10-22 RX ORDER — EPHEDRINE SULFATE 50 MG/ML
INJECTION INTRAVENOUS AS NEEDED
Status: DISCONTINUED | OUTPATIENT
Start: 2019-10-22 | End: 2019-10-22 | Stop reason: SURG

## 2019-10-22 RX ORDER — GLYCOPYRROLATE 0.2 MG/ML
INJECTION INTRAMUSCULAR; INTRAVENOUS AS NEEDED
Status: DISCONTINUED | OUTPATIENT
Start: 2019-10-22 | End: 2019-10-22 | Stop reason: SURG

## 2019-10-22 RX ORDER — ROCURONIUM BROMIDE 10 MG/ML
INJECTION, SOLUTION INTRAVENOUS AS NEEDED
Status: DISCONTINUED | OUTPATIENT
Start: 2019-10-22 | End: 2019-10-22 | Stop reason: SURG

## 2019-10-22 RX ORDER — OXYCODONE AND ACETAMINOPHEN 10; 325 MG/1; MG/1
1 TABLET ORAL ONCE AS NEEDED
Status: COMPLETED | OUTPATIENT
Start: 2019-10-22 | End: 2019-10-22

## 2019-10-22 RX ORDER — SODIUM CHLORIDE 0.9 % (FLUSH) 0.9 %
3 SYRINGE (ML) INJECTION EVERY 12 HOURS SCHEDULED
Status: DISCONTINUED | OUTPATIENT
Start: 2019-10-22 | End: 2019-10-22 | Stop reason: HOSPADM

## 2019-10-22 RX ORDER — MORPHINE SULFATE 2 MG/ML
2 INJECTION, SOLUTION INTRAMUSCULAR; INTRAVENOUS
Status: DISCONTINUED | OUTPATIENT
Start: 2019-10-22 | End: 2019-10-22 | Stop reason: HOSPADM

## 2019-10-22 RX ORDER — SODIUM CHLORIDE 0.9 % (FLUSH) 0.9 %
3-10 SYRINGE (ML) INJECTION AS NEEDED
Status: DISCONTINUED | OUTPATIENT
Start: 2019-10-22 | End: 2019-10-22 | Stop reason: HOSPADM

## 2019-10-22 RX ORDER — ACETAMINOPHEN 500 MG
1000 TABLET ORAL ONCE
Status: COMPLETED | OUTPATIENT
Start: 2019-10-22 | End: 2019-10-22

## 2019-10-22 RX ORDER — BUPIVACAINE HCL/0.9 % NACL/PF 0.1 %
2 PLASTIC BAG, INJECTION (ML) EPIDURAL ONCE
Status: COMPLETED | OUTPATIENT
Start: 2019-10-22 | End: 2019-10-22

## 2019-10-22 RX ORDER — MAGNESIUM HYDROXIDE 1200 MG/15ML
LIQUID ORAL AS NEEDED
Status: DISCONTINUED | OUTPATIENT
Start: 2019-10-22 | End: 2019-10-22 | Stop reason: HOSPADM

## 2019-10-22 RX ORDER — SODIUM CHLORIDE 9 MG/ML
100 INJECTION, SOLUTION INTRAVENOUS CONTINUOUS
Status: DISCONTINUED | OUTPATIENT
Start: 2019-10-22 | End: 2019-10-22 | Stop reason: HOSPADM

## 2019-10-22 RX ORDER — IPRATROPIUM BROMIDE AND ALBUTEROL SULFATE 2.5; .5 MG/3ML; MG/3ML
3 SOLUTION RESPIRATORY (INHALATION) ONCE AS NEEDED
Status: DISCONTINUED | OUTPATIENT
Start: 2019-10-22 | End: 2019-10-22 | Stop reason: HOSPADM

## 2019-10-22 RX ORDER — HYDRALAZINE HYDROCHLORIDE 20 MG/ML
5 INJECTION INTRAMUSCULAR; INTRAVENOUS
Status: DISCONTINUED | OUTPATIENT
Start: 2019-10-22 | End: 2019-10-22 | Stop reason: HOSPADM

## 2019-10-22 RX ORDER — FLUMAZENIL 0.1 MG/ML
0.2 INJECTION INTRAVENOUS AS NEEDED
Status: DISCONTINUED | OUTPATIENT
Start: 2019-10-22 | End: 2019-10-22 | Stop reason: HOSPADM

## 2019-10-22 RX ORDER — SODIUM CHLORIDE 9 MG/ML
INJECTION, SOLUTION INTRAVENOUS AS NEEDED
Status: DISCONTINUED | OUTPATIENT
Start: 2019-10-22 | End: 2019-10-22 | Stop reason: HOSPADM

## 2019-10-22 RX ORDER — SUCCINYLCHOLINE CHLORIDE 20 MG/ML
INJECTION INTRAMUSCULAR; INTRAVENOUS AS NEEDED
Status: DISCONTINUED | OUTPATIENT
Start: 2019-10-22 | End: 2019-10-22 | Stop reason: SURG

## 2019-10-22 RX ORDER — ONDANSETRON 2 MG/ML
INJECTION INTRAMUSCULAR; INTRAVENOUS AS NEEDED
Status: DISCONTINUED | OUTPATIENT
Start: 2019-10-22 | End: 2019-10-22 | Stop reason: SURG

## 2019-10-22 RX ORDER — MIDAZOLAM HYDROCHLORIDE 1 MG/ML
1 INJECTION INTRAMUSCULAR; INTRAVENOUS
Status: DISCONTINUED | OUTPATIENT
Start: 2019-10-22 | End: 2019-10-22 | Stop reason: HOSPADM

## 2019-10-22 RX ORDER — SODIUM CHLORIDE 9 MG/ML
125 INJECTION, SOLUTION INTRAVENOUS CONTINUOUS
Status: DISCONTINUED | OUTPATIENT
Start: 2019-10-22 | End: 2019-10-23 | Stop reason: HOSPADM

## 2019-10-22 RX ORDER — PHENYLEPHRINE HCL IN 0.9% NACL 0.8MG/10ML
SYRINGE (ML) INTRAVENOUS AS NEEDED
Status: DISCONTINUED | OUTPATIENT
Start: 2019-10-22 | End: 2019-10-22 | Stop reason: SURG

## 2019-10-22 RX ORDER — HYOSCYAMINE SULFATE 0.125 MG
125 TABLET,DISINTEGRATING ORAL EVERY 4 HOURS PRN
Status: DISCONTINUED | OUTPATIENT
Start: 2019-10-22 | End: 2019-10-23 | Stop reason: HOSPADM

## 2019-10-22 RX ORDER — SODIUM CHLORIDE 0.9 % (FLUSH) 0.9 %
10 SYRINGE (ML) INJECTION AS NEEDED
Status: DISCONTINUED | OUTPATIENT
Start: 2019-10-22 | End: 2019-10-22 | Stop reason: HOSPADM

## 2019-10-22 RX ORDER — MIDAZOLAM HYDROCHLORIDE 1 MG/ML
2 INJECTION INTRAMUSCULAR; INTRAVENOUS
Status: DISCONTINUED | OUTPATIENT
Start: 2019-10-22 | End: 2019-10-22 | Stop reason: HOSPADM

## 2019-10-22 RX ORDER — ATORVASTATIN CALCIUM 10 MG/1
10 TABLET, FILM COATED ORAL NIGHTLY
Status: DISCONTINUED | OUTPATIENT
Start: 2019-10-22 | End: 2019-10-23 | Stop reason: HOSPADM

## 2019-10-22 RX ORDER — DEXAMETHASONE SODIUM PHOSPHATE 4 MG/ML
4 INJECTION, SOLUTION INTRA-ARTICULAR; INTRALESIONAL; INTRAMUSCULAR; INTRAVENOUS; SOFT TISSUE ONCE AS NEEDED
Status: COMPLETED | OUTPATIENT
Start: 2019-10-22 | End: 2019-10-22

## 2019-10-22 RX ORDER — NEOSTIGMINE METHYLSULFATE 1 MG/ML
INJECTION, SOLUTION INTRAVENOUS AS NEEDED
Status: DISCONTINUED | OUTPATIENT
Start: 2019-10-22 | End: 2019-10-22 | Stop reason: SURG

## 2019-10-22 RX ORDER — ISOSORBIDE MONONITRATE 30 MG/1
30 TABLET, EXTENDED RELEASE ORAL DAILY
Status: DISCONTINUED | OUTPATIENT
Start: 2019-10-22 | End: 2019-10-23 | Stop reason: HOSPADM

## 2019-10-22 RX ORDER — FENTANYL CITRATE 50 UG/ML
25 INJECTION, SOLUTION INTRAMUSCULAR; INTRAVENOUS AS NEEDED
Status: DISCONTINUED | OUTPATIENT
Start: 2019-10-22 | End: 2019-10-22 | Stop reason: HOSPADM

## 2019-10-22 RX ORDER — LABETALOL HYDROCHLORIDE 5 MG/ML
5 INJECTION, SOLUTION INTRAVENOUS
Status: DISCONTINUED | OUTPATIENT
Start: 2019-10-22 | End: 2019-10-22 | Stop reason: HOSPADM

## 2019-10-22 RX ORDER — ONDANSETRON 2 MG/ML
4 INJECTION INTRAMUSCULAR; INTRAVENOUS EVERY 6 HOURS PRN
Status: DISCONTINUED | OUTPATIENT
Start: 2019-10-22 | End: 2019-10-22

## 2019-10-22 RX ADMIN — SODIUM CHLORIDE, POTASSIUM CHLORIDE, SODIUM LACTATE AND CALCIUM CHLORIDE: 600; 310; 30; 20 INJECTION, SOLUTION INTRAVENOUS at 07:00

## 2019-10-22 RX ADMIN — HYDROCODONE BITARTRATE AND ACETAMINOPHEN 2 TABLET: 7.5; 325 TABLET ORAL at 23:34

## 2019-10-22 RX ADMIN — ACETAMINOPHEN 1000 MG: 500 TABLET, FILM COATED ORAL at 06:50

## 2019-10-22 RX ADMIN — FENTANYL CITRATE 25 MCG: 50 INJECTION, SOLUTION INTRAMUSCULAR; INTRAVENOUS at 11:45

## 2019-10-22 RX ADMIN — ONDANSETRON HYDROCHLORIDE 4 MG: 2 SOLUTION INTRAMUSCULAR; INTRAVENOUS at 17:27

## 2019-10-22 RX ADMIN — KETOROLAC TROMETHAMINE 15 MG: 15 INJECTION, SOLUTION INTRAMUSCULAR; INTRAVENOUS at 16:16

## 2019-10-22 RX ADMIN — FENTANYL CITRATE 25 MCG: 50 INJECTION, SOLUTION INTRAMUSCULAR; INTRAVENOUS at 11:48

## 2019-10-22 RX ADMIN — VASOPRESSIN 1 ML: 20 INJECTION INTRAVENOUS at 10:29

## 2019-10-22 RX ADMIN — SODIUM CHLORIDE 125 ML/HR: 9 INJECTION, SOLUTION INTRAVENOUS at 20:17

## 2019-10-22 RX ADMIN — SODIUM CHLORIDE 2 G: 9 INJECTION, SOLUTION INTRAVENOUS at 22:22

## 2019-10-22 RX ADMIN — SODIUM CHLORIDE, POTASSIUM CHLORIDE, SODIUM LACTATE AND CALCIUM CHLORIDE: 600; 310; 30; 20 INJECTION, SOLUTION INTRAVENOUS at 10:34

## 2019-10-22 RX ADMIN — FENTANYL CITRATE 75 MCG: 50 INJECTION INTRAMUSCULAR; INTRAVENOUS at 10:22

## 2019-10-22 RX ADMIN — ROCURONIUM BROMIDE 15 MG: 10 INJECTION INTRAVENOUS at 08:10

## 2019-10-22 RX ADMIN — Medication 80 MCG: at 09:42

## 2019-10-22 RX ADMIN — ROCURONIUM BROMIDE 5 MG: 10 INJECTION INTRAVENOUS at 07:07

## 2019-10-22 RX ADMIN — SODIUM CHLORIDE, POTASSIUM CHLORIDE, SODIUM LACTATE AND CALCIUM CHLORIDE 1000 ML: 600; 310; 30; 20 INJECTION, SOLUTION INTRAVENOUS at 06:04

## 2019-10-22 RX ADMIN — EPHEDRINE SULFATE 10 MG: 50 INJECTION INTRAVENOUS at 10:36

## 2019-10-22 RX ADMIN — KETOROLAC TROMETHAMINE 15 MG: 15 INJECTION, SOLUTION INTRAMUSCULAR; INTRAVENOUS at 22:22

## 2019-10-22 RX ADMIN — Medication 2 G: at 07:16

## 2019-10-22 RX ADMIN — ATORVASTATIN CALCIUM 10 MG: 10 TABLET, FILM COATED ORAL at 20:18

## 2019-10-22 RX ADMIN — OXYCODONE HYDROCHLORIDE AND ACETAMINOPHEN 1 TABLET: 10; 325 TABLET ORAL at 11:45

## 2019-10-22 RX ADMIN — SODIUM CHLORIDE 2 G: 9 INJECTION, SOLUTION INTRAVENOUS at 15:58

## 2019-10-22 RX ADMIN — VASOPRESSIN 1 ML: 20 INJECTION INTRAVENOUS at 07:44

## 2019-10-22 RX ADMIN — FENTANYL CITRATE 150 MCG: 50 INJECTION INTRAMUSCULAR; INTRAVENOUS at 07:18

## 2019-10-22 RX ADMIN — ONDANSETRON HYDROCHLORIDE 4 MG: 2 SOLUTION INTRAMUSCULAR; INTRAVENOUS at 10:39

## 2019-10-22 RX ADMIN — ONDANSETRON HYDROCHLORIDE 4 MG: 2 SOLUTION INTRAMUSCULAR; INTRAVENOUS at 22:22

## 2019-10-22 RX ADMIN — Medication 120 MCG: at 07:29

## 2019-10-22 RX ADMIN — Medication 80 MCG: at 07:16

## 2019-10-22 RX ADMIN — ISOSORBIDE MONONITRATE 30 MG: 30 TABLET, EXTENDED RELEASE ORAL at 15:58

## 2019-10-22 RX ADMIN — SODIUM CHLORIDE 125 ML/HR: 9 INJECTION, SOLUTION INTRAVENOUS at 13:30

## 2019-10-22 RX ADMIN — EPHEDRINE SULFATE 10 MG: 50 INJECTION INTRAVENOUS at 10:31

## 2019-10-22 RX ADMIN — GLYCOPYRROLATE 0.4 MG: 0.2 INJECTION, SOLUTION INTRAMUSCULAR; INTRAVENOUS at 10:39

## 2019-10-22 RX ADMIN — VASOPRESSIN 1 ML: 20 INJECTION INTRAVENOUS at 07:32

## 2019-10-22 RX ADMIN — ROCURONIUM BROMIDE 20 MG: 10 INJECTION INTRAVENOUS at 08:56

## 2019-10-22 RX ADMIN — PROPOFOL 30 MG: 10 INJECTION, EMULSION INTRAVENOUS at 07:12

## 2019-10-22 RX ADMIN — PROPOFOL 30 MG: 10 INJECTION, EMULSION INTRAVENOUS at 10:22

## 2019-10-22 RX ADMIN — NEOSTIGMINE METHYLSULFATE 3 MG: 1 INJECTION INTRAVENOUS at 10:39

## 2019-10-22 RX ADMIN — Medication 120 MCG: at 07:27

## 2019-10-22 RX ADMIN — CARVEDILOL 3.12 MG: 3.12 TABLET, FILM COATED ORAL at 18:29

## 2019-10-22 RX ADMIN — LIDOCAINE HYDROCHLORIDE 100 MG: 20 INJECTION, SOLUTION INFILTRATION; PERINEURAL at 07:07

## 2019-10-22 RX ADMIN — FENTANYL CITRATE 25 MCG: 50 INJECTION, SOLUTION INTRAMUSCULAR; INTRAVENOUS at 11:46

## 2019-10-22 RX ADMIN — FENTANYL CITRATE 75 MCG: 50 INJECTION INTRAMUSCULAR; INTRAVENOUS at 09:07

## 2019-10-22 RX ADMIN — MIDAZOLAM 2 MG: 1 INJECTION INTRAMUSCULAR; INTRAVENOUS at 06:57

## 2019-10-22 RX ADMIN — DEXAMETHASONE SODIUM PHOSPHATE 4 MG: 4 INJECTION, SOLUTION INTRAMUSCULAR; INTRAVENOUS at 06:50

## 2019-10-22 RX ADMIN — ROCURONIUM BROMIDE 30 MG: 10 INJECTION INTRAVENOUS at 07:20

## 2019-10-22 RX ADMIN — SUCCINYLCHOLINE CHLORIDE 100 MG: 20 INJECTION, SOLUTION INTRAMUSCULAR; INTRAVENOUS at 07:07

## 2019-10-22 RX ADMIN — PROPOFOL 50 MG: 10 INJECTION, EMULSION INTRAVENOUS at 10:20

## 2019-10-22 RX ADMIN — FENTANYL CITRATE 100 MCG: 50 INJECTION INTRAMUSCULAR; INTRAVENOUS at 08:45

## 2019-10-22 RX ADMIN — Medication 160 MCG: at 07:32

## 2019-10-22 RX ADMIN — PROPOFOL 70 MG: 10 INJECTION, EMULSION INTRAVENOUS at 07:07

## 2019-10-22 RX ADMIN — FENTANYL CITRATE 100 MCG: 50 INJECTION INTRAMUSCULAR; INTRAVENOUS at 07:07

## 2019-10-22 RX ADMIN — FENTANYL CITRATE 25 MCG: 50 INJECTION, SOLUTION INTRAMUSCULAR; INTRAVENOUS at 11:50

## 2019-10-22 NOTE — OP NOTE
PROSTATECTOMY LAPAROSCOPIC WITH DAVINCI SI ROBOT  Procedure Note    Zackery Molina  10/22/2019    Pre-op Diagnosis:   Prostate cancer (CMS/HCC) [C61]    Post-op Diagnosis:     Post-Op Diagnosis Codes:     * Prostate cancer (CMS/HCC) [C61]    Procedure/CPT® Codes:      Procedure(s):  RADICAL PROSTATECTOMY LAPAROSCOPIC WITH DAVINCI SI ROBOT    Surgeon(s):  Haim Warren MD    Anesthesia: General    Staff:   Circulator: José Greene, RN  Scrub Person: Woo Laguerre; Karely Roldan; Salena Plummer    Estimated Blood Loss: 100ml    Specimens:                Specimens     ID Source Type Tests Collected By Collected At Frozen?      A Prostate Tissue · TISSUE PATHOLOGY EXAM   Haim Warren MD 10/22/19 0953 No     Description: PROSTATE            Drains:   Closed/Suction Drain 1 RLQ Bulb 15 Fr. (Active)   Site Description Unable to view 10/22/2019 11:15 AM   Dressing Status Clean;Dry;Intact 10/22/2019 11:15 AM   Drainage Appearance Bloody 10/22/2019 11:15 AM   Status To bulb suction 10/22/2019 11:15 AM       Urethral Catheter Double-lumen;Silicone 20 Fr. (Active)   Daily Indications Selected surgeries ( tract, abdomen) 10/22/2019 11:15 AM   Site Assessment Clean;Skin intact 10/22/2019 11:02 AM   Collection Container Standard drainage bag 10/22/2019 11:15 AM   Securement Method Securing device 10/22/2019 11:15 AM       Findings: No enlarged LN  No rectal injury  No UO injury  Watertight anastomosis     Complications: None      Haim Warren MD     Date: 10/22/2019  Time: 11:23 AM     Indications: The patient was admitted to the hospital with adenocarcinoma of the prostate. The patient now presents for Robotic Prostatectomy after discussing therapeutic alternatives.   Informed consent was obtained prior to the procedure.        Surgeon: Haim Warren MD     Assistants: Karely Roldan CFA    Anesthesia: General endotracheal anesthesia        Procedure Details   Patient was  brought to the operating room.  General endotracheal anesthesia was administered in the supine position.  The patient was converted to the dorsal lithotomy position and prepped and draped in the usual sterile fashion.  Timeout was done to ensure the correct patient, procedure, and site. He received preoperative antibiotics in the holding room.  20 Fr Jimenez catheter was placed under sterile conditions, and 10 cc sterile water was used to inflate the balloon.    The peritoneal space was accessed using the Veress needle with confirmation with two clicks and on low flow.  I marked my trocar sites with a 12 mm supraumbilical, two 8mm trocars in the right lower quadrant, an 8mm and 12 mm in the left lower quadrant, and 5 mm in the left upper quadrant.  I insufflated the abdomen to 15mm of Mercury with CO2.  I placed my 12 mm supraumbilical trocar and then placed the remaining under direct vision. I used a 0 Vicryl on the marker.to device to close my 12 mm trocar in the left lower quadrant to be tied at the conclusion of the case. Pt. Was placed in steep Trendelenburg position and the robot was docked. I began the procedure by taking down sigmoid adhesions for 15 minutes.  I then incised the peritoneal reflection 1 cm above the rectum to access the seminal vesicles and vas deferens.  I identified the left vas deferens coursing laterally where I incised it to gain access to the tip of the seminal vesicles.  I dissected the left seminal vesicle using clips at the tips to ensure to damage to the neurovascular bundle.  I performed the same maneuver on the patients right vas and seminal vesicles.  I then placed these structures on traction and was able to get a plane between the prostate and the rectum dissecting towards the apex of the prostate.    At this point, I turned my attention to developing the Space of Retzius.  I identified the medial umbilical ligaments.  I incised lateral to the left and right  medial  umbilical ligament down to the vas deferens.  I then connected my incision towards the right taking down the urachus and removing the bladder off of the pubic bone.  I defatted the anterior prostate and took down the superficial dorsal venous complex with bipolar cautery.  At that point, I incised the left endopelvic fascia exposing the lateral edge of the prostate.  I then took down the puboprostatic ligament exposing the notch of the dorsal venous complex.  Any apical bleeders were controlled with bipolar cautery.  I performed the same maneuver of the right side.  I then used a 0 Vicryl on a CT 1 needle to tie off the DVC in a figure of eight fashion.  I then placed a DVC suspension suture with 2.0 Vicryl anchoring this to the periosteum of the pubic bone on the left with a heme o lock and lapra ty and then doing a figure of eight suture around the DVC.  Again, I anchored on the right side with a heme o lock and lapra ty.    I placed the 30 degree down scope and had my assistant move the jon catheter back and forth to identify the bladder neck.  I also performed the pinch maneuver to identify the bladder neck.  I incised the anterior bladder neck exposing the jon catheter in the midline.  I used my third arm to retract the jon catheter.  I identified the posterior bladder neck.  It appeared that I was well away from the ureteral orifices.  I incised the posterior bladder neck and was able to expose the previously dissected vas deferens and seminal vesicles.  I used the third arm to place these on traction.  I then incised the lateral prostatic fascia at the mid prostate and brought this back to the pedicles of the prostate.      I then took down the left pedicle of the prostate with Heme-o-Lock Clips to help with nerve sparing technique.  I then got a nice nerve sparring plane interfascial staying above the prostatic capsule and took this all the way to the apex of the prostate.  I performed the same  maneuver on the right pedicle.    I placed the prostate on traction and incised the dorsal venous complex with monopolar cautery.  I then exposed the lateral shoulders of the urethra.  I incised the anterior urethra exposing the jon catheter.  I then incised the posterior urethra leaving a nice urethral stump.  I incised the rectourethralis muscle and the posterior leaflet of Denovolliers Fascia completely freeing the prostate.  The prostate was placed in an Endocatch bag.      I inspected the pelvis for any bleeding.  I used to 3.0 Chromic in an interrupted fashion to obtain hemostasis on the left bundle. The pelvis was irrigated with sterile water.  I placed Regi on the nerve bundles for hemostasis.      I tailored the bladder neck with 3.0 Chromic in an interrupted fashion. I then used a 3.0 Stratafix double armed suture to perform the anastomosis.  I ran one arm clockwise and one arm counter clockwise getting great mucosal to mucosal aposition of the urethra to the bladder.  I then tied the two ends of the suture to themselves.  The final 20 Fr jon catheter was placed with 10cc sterile water used to inflate the balloon.  I tested the anastomosis with 120cc of saline and it was water tight.  I placed a 15 round VAL drain through the third arm that was secured with a 2.0 silk suture.     The robot was undocked.  The patient was placed in dorsal lithotomy position.  I incised my 12 mm supraumbilical trocar and removed the prostate.  I then closed the fascia with 0 Vicryl on a UR 6 needle in a figure of eight fashion.  Wounds were irrigated.  I tied my previously placed O Vicryl.  I then closed the skin incisions with 4.0 Vicryl in a subcuticular fashion.  Sterile dressings were applied. The jon was secured to the leg.  Pt. Was awakened from anesthesia in stable condition.  All sponge, needle, and instrument counts were correct.                           Condition: stable

## 2019-10-22 NOTE — PERIOPERATIVE NURSING NOTE
A-Line dc'd from right radial per order and protocol followed, cathelon intact, pressure held at site and pressure dressing applied, denies numbness in hand, color pink, warm.

## 2019-10-22 NOTE — PERIOPERATIVE NURSING NOTE
A-Line right radial present upon admission to PACU site, clear, dressing dry and intact, right hand warm, pink.

## 2019-10-22 NOTE — ANESTHESIA POSTPROCEDURE EVALUATION
Patient: Zackery Molina    Procedure Summary     Date:  10/22/19 Room / Location:  [facility] OR  /  [facility] OR    Anesthesia Start:  0704 Anesthesia Stop:  1106    Procedure:  RADICAL PROSTATECTOMY LAPAROSCOPIC WITH DAVINCI SI ROBOT (Bilateral Abdomen) Diagnosis:       Prostate cancer (CMS/HCC)      (Prostate cancer (CMS/HCC) [C61])    Surgeon:  Haim Warren MD Provider:  Ash Puri CRNA    Anesthesia Type:  general ASA Status:  3          Anesthesia Type: general  Last vitals  BP   125/68 (10/22/19 1423)   Temp   97.3 °F (36.3 °C) (10/22/19 1423)   Pulse   62 (10/22/19 1423)   Resp   16 (10/22/19 1423)     SpO2   100 % (10/22/19 1423)     Post Anesthesia Care and Evaluation    Patient location during evaluation: PACU  Patient participation: complete - patient participated  Level of consciousness: awake and alert  Pain management: adequate  Airway patency: patent  Anesthetic complications: No anesthetic complications  PONV Status: none  Cardiovascular status: acceptable and hemodynamically stable  Respiratory status: acceptable  Hydration status: acceptable    Comments: Blood pressure 125/68, pulse 62, temperature 97.3 °F (36.3 °C), temperature source Axillary, resp. rate 16, SpO2 100 %.    Patient discharged from PACU based upon Jv score. Please see RN notes for further details

## 2019-10-22 NOTE — ANESTHESIA PREPROCEDURE EVALUATION
Anesthesia Evaluation     Patient summary reviewed   no history of anesthetic complications:  NPO Solid Status: > 8 hours  NPO Liquid Status: > 8 hours           Airway   Mallampati: I  TM distance: >3 FB  Neck ROM: full  Dental - normal exam     Pulmonary    (-) COPD, asthma, sleep apnea, not a smoker  Cardiovascular   Exercise tolerance: excellent (>7 METS)    ECG reviewed  Patient on routine beta blocker and Beta blocker given within 24 hours of surgery    (+) pacemaker (medtronic protecta) ICD interrogated 1-3 months ago, past MI , CAD, CABG (2011, 4V), CHF (EF 35%),   (-) angina, cardiac stents      Neuro/Psych  (-) seizures, TIA, CVA  GI/Hepatic/Renal/Endo    (+)  GERD,    (-) liver disease, no renal disease, diabetes    Musculoskeletal     Abdominal    Substance History      OB/GYN          Other      history of cancer                    Anesthesia Plan    ASA 3     general   (5 lead ekg, magnet needed for defib- will interrogate after)  intravenous induction   Anesthetic plan, all risks, benefits, and alternatives have been provided, discussed and informed consent has been obtained with: patient.

## 2019-10-22 NOTE — ANESTHESIA PROCEDURE NOTES
Airway  Urgency: elective    Date/Time: 10/22/2019 7:12 AM    General Information and Staff    Patient location during procedure: OB  CRNA: Ash Puri CRNA    Indications and Patient Condition  Indications for airway management: airway protection    Preoxygenated: yes  Mask difficulty assessment: 1 - vent by mask    Final Airway Details  Final airway type: endotracheal airway      Successful airway: ETT  Cuffed: yes   Successful intubation technique: direct laryngoscopy  Facilitating devices/methods: intubating stylet  Endotracheal tube insertion site: oral  Blade: Dale  Blade size: 4 (3.5)  ETT size (mm): 7.5  Cormack-Lehane Classification: grade I - full view of glottis  Placement verified by: chest auscultation and capnometry   Measured from: gums  ETT/EBT to gums (cm): 23  Number of attempts at approach: 1  Assessment: lips, teeth, and gum same as pre-op and atraumatic intubation

## 2019-10-22 NOTE — PLAN OF CARE
Problem: Patient Care Overview  Goal: Plan of Care Review  Outcome: Ongoing (interventions implemented as appropriate)    Patient transferred post op radical prostatectomy. Lap x5 with dermabond. VAL x1 with serosang output. F/C to bsd. IVF NS at 125cc/hr, clear liquid diet. Currently sleeping. Family at bedside. Continue to monitor      10/22/19 1542   Coping/Psychosocial   Plan of Care Reviewed With patient   Plan of Care Review   Progress no change     Goal: Individualization and Mutuality  Outcome: Ongoing (interventions implemented as appropriate)    Goal: Discharge Needs Assessment  Outcome: Ongoing (interventions implemented as appropriate)   10/22/19 1542   Discharge Needs Assessment   Readmission Within the Last 30 Days no previous admission in last 30 days   Concerns to be Addressed no discharge needs identified   Patient/Family Anticipates Transition to home   Patient/Family Anticipated Services at Transition none   Transportation Concerns car, none   Transportation Anticipated family or friend will provide   Anticipated Changes Related to Illness none   Equipment Needed After Discharge none     Goal: Interprofessional Rounds/Family Conf  Outcome: Ongoing (interventions implemented as appropriate)   10/22/19 1542   Interdisciplinary Rounds/Family Conf   Participants family;nursing;patient;physician       Problem: Prostatectomy, Radical (Adult)  Goal: Signs and Symptoms of Listed Potential Problems Will be Absent, Minimized or Managed (Prostatectomy, Radical)  Outcome: Ongoing (interventions implemented as appropriate)   10/22/19 1542   Goal/Outcome Evaluation   Problems Assessed (Radical Prostatectomy) all   Problems Present (Radical Prostatectomy) pain     Goal: Anesthesia/Sedation Recovery  Outcome: Ongoing (interventions implemented as appropriate)

## 2019-10-23 VITALS
RESPIRATION RATE: 16 BRPM | DIASTOLIC BLOOD PRESSURE: 55 MMHG | HEART RATE: 64 BPM | BODY MASS INDEX: 24.59 KG/M2 | WEIGHT: 171.74 LBS | HEIGHT: 70 IN | SYSTOLIC BLOOD PRESSURE: 116 MMHG | OXYGEN SATURATION: 97 % | TEMPERATURE: 97.6 F

## 2019-10-23 PROBLEM — I50.22 CHRONIC SYSTOLIC CONGESTIVE HEART FAILURE (HCC): Chronic | Status: ACTIVE | Noted: 2019-10-23

## 2019-10-23 PROBLEM — K21.9 GASTROESOPHAGEAL REFLUX DISEASE WITHOUT ESOPHAGITIS: Chronic | Status: ACTIVE | Noted: 2019-10-23

## 2019-10-23 PROBLEM — I25.810 CORONARY ARTERY DISEASE INVOLVING CORONARY BYPASS GRAFT OF NATIVE HEART WITHOUT ANGINA PECTORIS: Chronic | Status: ACTIVE | Noted: 2019-10-23

## 2019-10-23 PROBLEM — I10 ESSENTIAL HYPERTENSION: Chronic | Status: ACTIVE | Noted: 2019-10-23

## 2019-10-23 LAB
ANION GAP SERPL CALCULATED.3IONS-SCNC: 8 MMOL/L (ref 5–15)
BUN BLD-MCNC: 17 MG/DL (ref 8–23)
BUN/CREAT SERPL: 21.3 (ref 7–25)
CALCIUM SPEC-SCNC: 7.8 MG/DL (ref 8.6–10.5)
CHLORIDE SERPL-SCNC: 104 MMOL/L (ref 98–107)
CO2 SERPL-SCNC: 26 MMOL/L (ref 22–29)
CREAT BLD-MCNC: 0.8 MG/DL (ref 0.76–1.27)
CREAT FLD-MCNC: 0.8 MG/DL
DEPRECATED RDW RBC AUTO: 42.5 FL (ref 37–54)
ERYTHROCYTE [DISTWIDTH] IN BLOOD BY AUTOMATED COUNT: 12.7 % (ref 12.3–15.4)
GFR SERPL CREATININE-BSD FRML MDRD: 95 ML/MIN/1.73
GLUCOSE BLD-MCNC: 199 MG/DL (ref 65–99)
HCT VFR BLD AUTO: 31.7 % (ref 37.5–51)
HGB BLD-MCNC: 11 G/DL (ref 13–17.7)
MCH RBC QN AUTO: 31.9 PG (ref 26.6–33)
MCHC RBC AUTO-ENTMCNC: 34.7 G/DL (ref 31.5–35.7)
MCV RBC AUTO: 91.9 FL (ref 79–97)
PLATELET # BLD AUTO: 176 10*3/MM3 (ref 140–450)
PMV BLD AUTO: 10.8 FL (ref 6–12)
POTASSIUM BLD-SCNC: 4.5 MMOL/L (ref 3.5–5.2)
RBC # BLD AUTO: 3.45 10*6/MM3 (ref 4.14–5.8)
SODIUM BLD-SCNC: 138 MMOL/L (ref 136–145)
WBC NRBC COR # BLD: 10.05 10*3/MM3 (ref 3.4–10.8)

## 2019-10-23 PROCEDURE — 63710000001 CARVEDILOL 3.125 MG TABLET: Performed by: UROLOGY

## 2019-10-23 PROCEDURE — 63710000001 ISOSORBIDE MONONITRATE 30 MG TABLET SUSTAINED-RELEASE 24 HOUR: Performed by: UROLOGY

## 2019-10-23 PROCEDURE — 80048 BASIC METABOLIC PNL TOTAL CA: CPT | Performed by: UROLOGY

## 2019-10-23 PROCEDURE — 85027 COMPLETE CBC AUTOMATED: CPT | Performed by: UROLOGY

## 2019-10-23 PROCEDURE — A9270 NON-COVERED ITEM OR SERVICE: HCPCS | Performed by: UROLOGY

## 2019-10-23 PROCEDURE — 25010000002 KETOROLAC TROMETHAMINE PER 15 MG: Performed by: UROLOGY

## 2019-10-23 PROCEDURE — 82570 ASSAY OF URINE CREATININE: CPT | Performed by: UROLOGY

## 2019-10-23 PROCEDURE — 63710000001 HYDROCODONE-ACETAMINOPHEN 7.5-325 MG TABLET: Performed by: UROLOGY

## 2019-10-23 PROCEDURE — 94799 UNLISTED PULMONARY SVC/PX: CPT

## 2019-10-23 RX ORDER — CEPHALEXIN 500 MG/1
500 CAPSULE ORAL 3 TIMES DAILY
Qty: 9 CAPSULE | Refills: 0 | Status: SHIPPED | OUTPATIENT
Start: 2019-10-23 | End: 2019-12-04

## 2019-10-23 RX ORDER — HYOSCYAMINE SULFATE 0.12 MG/1
0.12 TABLET SUBLINGUAL EVERY 4 HOURS PRN
Qty: 21 EACH | Refills: 1 | Status: SHIPPED | OUTPATIENT
Start: 2019-10-23 | End: 2019-12-04

## 2019-10-23 RX ORDER — DOCUSATE SODIUM 100 MG/1
100 CAPSULE, LIQUID FILLED ORAL 2 TIMES DAILY
Qty: 60 CAPSULE | Refills: 0 | Status: SHIPPED | OUTPATIENT
Start: 2019-10-23 | End: 2019-12-04

## 2019-10-23 RX ORDER — HYDROCODONE BITARTRATE AND ACETAMINOPHEN 7.5; 325 MG/1; MG/1
1 TABLET ORAL EVERY 6 HOURS PRN
Qty: 16 TABLET | Refills: 0 | Status: SHIPPED | OUTPATIENT
Start: 2019-10-23 | End: 2019-12-04

## 2019-10-23 RX ADMIN — HYDROCODONE BITARTRATE AND ACETAMINOPHEN 2 TABLET: 7.5; 325 TABLET ORAL at 04:54

## 2019-10-23 RX ADMIN — SODIUM CHLORIDE 125 ML/HR: 9 INJECTION, SOLUTION INTRAVENOUS at 01:00

## 2019-10-23 RX ADMIN — HYDROCODONE BITARTRATE AND ACETAMINOPHEN 2 TABLET: 7.5; 325 TABLET ORAL at 15:15

## 2019-10-23 RX ADMIN — KETOROLAC TROMETHAMINE 15 MG: 15 INJECTION, SOLUTION INTRAMUSCULAR; INTRAVENOUS at 04:54

## 2019-10-23 RX ADMIN — ISOSORBIDE MONONITRATE 30 MG: 30 TABLET, EXTENDED RELEASE ORAL at 08:36

## 2019-10-23 RX ADMIN — CARVEDILOL 3.12 MG: 3.12 TABLET, FILM COATED ORAL at 08:36

## 2019-10-23 NOTE — DISCHARGE SUMMARY
Date of Discharge:  10/23/2019    Discharge Diagnosis: Prostate Cancer    Presenting Problem/History of Present Illness  Prostate cancer (CMS/HCC) [C61]  Prostate cancer (CMS/HCC) [C61]       Hospital Course  See notes    Procedures Performed  Procedure(s):  RADICAL PROSTATECTOMY LAPAROSCOPIC WITH DAVINCI SI ROBOT       Consults:   Consults     Date and Time Order Name Status Description    10/23/2019 0705 Inpatient [facility] Practice Consult              Condition on Discharge:  Good    Vital Signs  Temp:  [97.3 °F (36.3 °C)-98.2 °F (36.8 °C)] 97.9 °F (36.6 °C)  Heart Rate:  [55-83] 72  Resp:  [8-16] 16  BP: (109-159)/(53-81) 110/53    Discharge Disposition  Home or Self Care    Discharge Medications     Discharge Medications      New Medications      Instructions Start Date   cephalexin 500 MG capsule  Commonly known as:  KEFLEX   500 mg, Oral, 3 Times Daily, Start day prior to cath removal      docusate sodium 100 MG capsule  Commonly known as:  COLACE   100 mg, Oral, 2 Times Daily      HYDROcodone-acetaminophen 7.5-325 MG per tablet  Commonly known as:  NORCO   1 tablet, Oral, Every 6 Hours PRN      Hyoscyamine Sulfate SL 0.125 MG sublingual tablet  Commonly known as:  LEVSIN/SL   0.125 mg, Oral, Every 4 Hours PRN         Continue These Medications      Instructions Start Date   aspirin 81 MG EC tablet   81 mg, Oral, Daily, WILL STOP 11/2/18      carvedilol 3.125 MG tablet  Commonly known as:  COREG   3.125 mg, Oral, 2 Times Daily With Meals      isosorbide mononitrate 30 MG 24 hr tablet  Commonly known as:  IMDUR   30 mg, Daily      nitroglycerin 0.4 MG SL tablet  Commonly known as:  NITROSTAT   0.4 mg, Sublingual, Every 5 Minutes PRN      simvastatin 10 MG tablet  Commonly known as:  ZOCOR   10 mg, Oral, Nightly             Discharge Diet:     Activity at Discharge:     Follow-up Appointments  Future Appointments   Date Time Provider Department Center   1/6/2020  1:15 PM Ash Watkins MD [facility] None      Additional Instructions for the Follow-ups that You Need to Schedule     [facility] DIAGNOSTIC    Dec 04, 2019 (Approximate)        One week Gely John for cath removal  6 weeks with Dr. Warren    Test Results Pending at Discharge   Order Current Status    Creatinine, Body Fluid - Body Fluid, Retroperitoneum In process    Tissue Pathology Exam In process           Haim Warren MD  10/23/19  7:27 AM

## 2019-10-23 NOTE — PLAN OF CARE
Problem: Patient Care Overview  Goal: Plan of Care Review  Outcome: Ongoing (interventions implemented as appropriate)   10/23/19 3356   Coping/Psychosocial   Plan of Care Reviewed With patient   Plan of Care Review   Progress improving   OTHER   Outcome Summary TORADOL AND NORCO GIVEN FOR C/O PAIN WITH RELIEF. ZOFRAN FOR C/O NAUSEA X 1. AMBULATED IN HALLWAY WITH ASSIST. VAL FLUID SENT FOR CREATNINE. MONITOR UOP. EDDIE URINE NOTED IN [facility]. [facility] CATH CARE AND CHG COMPLETED. CONT TO MONITOR.

## 2019-10-23 NOTE — PROGRESS NOTES
Continued Stay Note  [facility]     Patient Name: Zackery Molina  MRN: [id number]  Today's Date: 10/23/2019    Admit Date: 10/22/2019    Discharge Plan     Row Name 10/23/19 1249       Plan    Plan  Pt is a [facility] member of [employer].  May contact navigators at [phone] or [phone] for any discharge needs    Patient/Family in Agreement with Plan  yes    Plan Comments  Visited with patient. Appreciative of visit.   aware of admission and patient denies any needs at this time.    Row Name 10/23/19 1136       Plan    Final Discharge Disposition Code  01 - home or self-care        Discharge Codes    No documentation.       Expected Discharge Date and Time     Expected Discharge Date Expected Discharge Time    Oct 23, 2019             Paige Parry RN

## 2019-10-23 NOTE — CONSULTS
Referring Provider: Dr. Warren  Reason for Consultation: Medical management    Patient Care Team:  Josiah Martines MD as PCP - General  Josiah Martines MD as PCP - Family Medicine    Chief complaint prostate cancer    Subjective .     History of present illness: Pleasant 74-year-old gentleman with known arteriosclerotic heart disease status post coronary bypass grafting that presented to the office several months ago with elevated PSA.  Patient referred to Dr. Warren.  Currently admitted for robotic prostatectomy.  I am asked to see him as his primary care provider as I taking care of him for many years.  Postop some crampy abdominal pain and hiccups but otherwise doing very well.  No other precipitating or relieving factors.  Pain as expected.  Plan for possible discharge home later today.      REVIEW OF SYSTEMS:    CONSTITUTIONAL:  Negative for anorexia, chills, fevers, night sweats and weight loss  EYES:  negative for eye dryness, icterus and redness  HEENT:   negative for dental problems, epistaxis, facial trauma and thrush  RESPIRATORY:  negative for chest tightness, cough, dyspnea on exertion, pneumonia and sputum  CARDIOVASCULAR: negative for chest pain, dyspnea, exertional chest pressure/discomfort, irregular heart beat, palpitations, paroxysmal nocturnal dyspnea and syncope  GASTROINTESTINAL:  negative for abdominal pain, hematemesis, jaundice, melena and rectal bleeding  MUSCULOSKELETAL:  negative for muscle weakness, myalgias and neck pain  NEUROLOGICAL:   negative for dizziness, headaches, seizures, speech problems, tremors and vertigo  INTEGUMENT: negative for pruritus, rash, skin color change and skin lesion(s)         History    Past Medical History:   Diagnosis Date   • Arthritis    • CHF (congestive heart failure) (CMS/[facility])     dr crow    • Childhood asthma    • Colon polyp    • Coronary artery disease    • Dysuria    • Elevated PSA    • GERD (gastroesophageal reflux disease)    •  History of pulmonary embolism     yrs ago    • Hyperlipidemia    • Myocardial infarction (CMS/HCC)    • Prostate cancer (CMS/HCC)    • Seborrheic keratosis 2019    Left [employer]    • Shingles      Past Surgical History:   Procedure Laterality Date   • APPENDECTOMY     • CARDIAC DEFIBRILLATOR PLACEMENT     • CHOLECYSTECTOMY     • COLONOSCOPY     • COLONOSCOPY N/A 10/20/2017    Procedure: COLONOSCOPY WITH ANESTHESIA;  Surgeon: Donny Mock MD;  Location: [facility] ENDOSCOPY;  Service:    • CORONARY ARTERY BYPASS GRAFT  2011    done at [facility] dr gibson   • ENDOSCOPY  2016   • ENDOSCOPY N/A 10/20/2017    Procedure: ESOPHAGOGASTRODUODENOSCOPY WITH ANESTHESIA;  Surgeon: Donny Mock MD;  Location:  [facility] ENDOSCOPY;  Service:    • EXCISION LESION Left 2019    Left [employer]  Path- Inflamed pigmented seborrheic keratosis   • HIP SURGERY Left    • KNEE ARTHROSCOPY Right 2018    Procedure: KNEE ARTHROSCOPY WITH MENISCECTOMY;  Surgeon: Hernesto Rodriguez MD;  Location: [facility] OR;  Service: Orthopedics   • PACEMAKER IMPLANTATION      defibrillator also [employer]   • PROSTATECTOMY Bilateral 10/22/2019    Procedure: RADICAL PROSTATECTOMY LAPAROSCOPIC WITH DAVINCI SI ROBOT;  Surgeon: Haim Warren MD;  Location:  [facility] OR;  Service: DaVinci     Family History   Problem Relation Age of Onset   • Colon cancer Father      Social History     Tobacco Use   • Smoking status: Former Smoker     Last attempt to quit: 10/20/1978     Years since quittin.0   • Smokeless tobacco: Never Used   Substance Use Topics   • Alcohol use: No   • Drug use: No     Medications Prior to Admission   Medication Sig Dispense Refill Last Dose   • carvedilol (COREG) 3.125 MG tablet Take 3.125 mg by mouth 2 (Two) Times a Day With Meals.   10/21/2019 at 2100   • isosorbide mononitrate (IMDUR) 30 MG 24 hr tablet 30 mg Daily.   10/21/2019 at 1000   • simvastatin (ZOCOR) 10 MG tablet Take 10 mg by mouth Every Night.    10/21/2019 at 2100   • aspirin 81 MG EC tablet Take 81 mg by mouth Daily. WILL STOP 11/2/18   10/15/2019   • nitroglycerin (NITROSTAT) 0.4 MG SL tablet Place 0.4 mg under the tongue Every 5 (Five) Minutes As Needed for Chest Pain.   More than a month at Unknown time       Allergies:  Patient has no known allergies.    Objective     Vital Signs   Temp:  [97.3 °F (36.3 °C)-98.2 °F (36.8 °C)] 97.9 °F (36.6 °C)  Heart Rate:  [55-83] 72  Resp:  [8-16] 16  BP: (109-159)/(53-81) 110/53          Physical Exam:  Constitutional: oriented to person, place, and time. appears well-developed.   HEENT:   Head: Normocephalic and atraumatic.   Eyes: Pupils are equal, round, and reactive to light.   Neck: Neck supple.   Cardiovascular: Regular rhythm and normal heart sounds.    Pulmonary/Chest: Effort normal and breath sounds normal. CTAB  Abdominal: Soft. Bowel sounds are normal. He exhibits no distension. There is no tenderness. There is no rebound and no guarding.   Musculoskeletal: Normal range of motion. He exhibits no edema or tenderness.   Neurological: He is alert and oriented to person, place, and time. He has normal reflexes.   Skin: Skin is warm and dry.     Results Review:   I reviewed the patient's new imaging results and agree with the interpretation.      Assessment/Plan       Prostate cancer (CMS/HCC)    Essential hypertension    Chronic systolic congestive heart failure (CMS/HCC)    Gastroesophageal reflux disease without esophagitis    Coronary artery disease involving coronary bypass graft of native heart without angina pectoris      GERD-exacerbated by pain meds and anesthesia, will add PPI as needed and can step up to Carafate 1 gm po q AC and nightly.        Hypertension-review pre and post BP's,will restart home BP meds when BP allows. Add clonidine 0.1 mg every 4 hours prn if bp> 140/90,monitor BP and adjust meds as necessary.    Medically stable for discharge home later today if tolerating orals and pain  controlled on oral pain medication.    I discussed the patient's findings and my recommendations with patient, family, nursing staff and consulting provider    Josiah Martines MD  10/23/19  8:46 AM

## 2019-10-23 NOTE — DISCHARGE INSTRUCTIONS
Call MD for temperature more than 101.5, nausea vomiting, yellow drainage from the wound, catheter not draining, blood clots in Jimenez.  No driving with catheter in place  No lifting greater than 10 pounds for 3 weeks  Do not submerge the incisions underwater is okay to shower  It will be normal for you to leak urine on activity after your Jimenez catheter is removed please continue Kegel's exercises to improve your stress incontinence

## 2019-10-23 NOTE — PROGRESS NOTES
Subjective    Mr. Molina is 74 y.o. male    Chief Complaint: sp RALP  History of Present Illness     Nausea overnight, no vomiting, on clears, pain controlled with toradol and norco, ambulated    The following portions of the patient's history were reviewed and updated as appropriate: allergies, current medications, past family history, past medical history, past social history, past surgical history and problem list.    Review of Systems      Current Facility-Administered Medications:   •  atorvastatin (LIPITOR) tablet 10 mg, 10 mg, Oral, Nightly, Haim Warren MD, 10 mg at 10/22/19 2018  •  carvedilol (COREG) tablet 3.125 mg, 3.125 mg, Oral, BID With Meals, Haim Warren MD, 3.125 mg at 10/22/19 1829  •  docusate sodium (COLACE) capsule 100 mg, 100 mg, Oral, BID PRN, Haim Warren MD  •  HYDROcodone-acetaminophen (NORCO) 5-325 MG per tablet 1 tablet, 1 tablet, Oral, Q4H PRN, Haim Warren MD  •  HYDROcodone-acetaminophen (NORCO) 7.5-325 MG per tablet 2 tablet, 2 tablet, Oral, Q4H PRN, Haim Warren MD, 2 tablet at 10/23/19 0454  •  hyoscyamine sulfate (ANASPAZ) disintegrating tablet 0.125 mg, 125 mcg, Oral, Q4H PRN, Haim Warren MD  •  isosorbide mononitrate (IMDUR) 24 hr tablet 30 mg, 30 mg, Oral, Daily, Haim Warren MD, 30 mg at 10/22/19 1558  •  ketorolac (TORADOL) injection 15 mg, 15 mg, Intravenous, Q6H PRN, Haim Warren MD, 15 mg at 10/23/19 0454  •  ondansetron (ZOFRAN) injection 4 mg, 4 mg, Intravenous, Q4H PRN, Haim Warren MD  •  sodium chloride 0.9 % infusion, 125 mL/hr, Intravenous, Continuous, Haim Warren MD, Last Rate: 125 mL/hr at 10/23/19 0455, 125 mL/hr at 10/23/19 0455    Past Medical History:   Diagnosis Date   • Arthritis    • CHF (congestive heart failure) (CMS/HCC)     dr crow    • Childhood asthma    • Colon polyp    • Coronary artery disease    • Dysuria    • Elevated PSA    • GERD  (gastroesophageal reflux disease)    • History of pulmonary embolism     yrs ago    • Hyperlipidemia    • Myocardial infarction (CMS/HCC)    • Prostate cancer (CMS/HCC)    • Seborrheic keratosis 2019    Left [place]    • Shingles        Past Surgical History:   Procedure Laterality Date   • APPENDECTOMY     • CARDIAC DEFIBRILLATOR PLACEMENT     • CHOLECYSTECTOMY     • COLONOSCOPY     • COLONOSCOPY N/A 10/20/2017    Procedure: COLONOSCOPY WITH ANESTHESIA;  Surgeon: Donny Mock MD;  Location: [facility] ENDOSCOPY;  Service:    • CORONARY ARTERY BYPASS GRAFT  2011    done at [facility]   • ENDOSCOPY  2016   • ENDOSCOPY N/A 10/20/2017    Procedure: ESOPHAGOGASTRODUODENOSCOPY WITH ANESTHESIA;  Surgeon: Donny Mock MD;  Location: [facility] ENDOSCOPY;  Service:    • EXCISION LESION Left 2019    Left [place]  Path- Inflamed pigmented seborrheic keratosis   • HIP SURGERY Left    • KNEE ARTHROSCOPY Right 2018    Procedure: KNEE ARTHROSCOPY WITH MENISCECTOMY;  Surgeon: Hernesto Rodriguez MD;  Location: [facility] OR;  Service: Orthopedics   • PACEMAKER IMPLANTATION      defibrillator also medtronic   • PROSTATECTOMY Bilateral 10/22/2019    Procedure: RADICAL PROSTATECTOMY LAPAROSCOPIC WITH DAVINCI SI ROBOT;  Surgeon: Haim Warren MD;  Location:  [facility] OR;  Service: DaVinci       Social History     Socioeconomic History   • Marital status:      Spouse name: Not on file   • Number of children: Not on file   • Years of education: Not on file   • Highest education level: Not on file   Tobacco Use   • Smoking status: Former Smoker     Last attempt to quit: 10/20/1978     Years since quittin.0   • Smokeless tobacco: Never Used   Substance and Sexual Activity   • Alcohol use: No   • Drug use: No   • Sexual activity: Defer       Family History   Problem Relation Age of Onset   • Colon cancer Father        Objective    /53 (BP Location: Left arm, Patient Position: Lying)    "Pulse 72   Temp 97.9 °F (36.6 °C) (Oral)   Resp 16   Ht 178 cm (70.08\")   Wt 77.9 kg (171 lb 11.8 oz)   SpO2 98%   BMI 24.59 kg/m²     Intake/Output Summary (Last 24 hours) at 10/23/2019 0706  Last data filed at 10/23/2019 0300  Gross per 24 hour   Intake 2653 ml   Output 670 ml   Net 1983 ml       Physical Exam   Constitutional: He is oriented to person, place, and time. He appears well-developed and well-nourished. No distress.   Pulmonary/Chest: Effort normal.   Abdominal: Soft. He exhibits distension. He exhibits no mass. There is tenderness. There is no rebound and no guarding. No hernia.   Slight distention   Genitourinary:   Genitourinary Comments: Jimenez in place with clear urine   Neurological: He is alert and oriented to person, place, and time.   Skin: Skin is warm and dry. He is not diaphoretic.   Incisions clean dry and intact   Psychiatric: He has a normal mood and affect.   Vitals reviewed.      Results Review:   I reviewed the patient's new clinical results.            Imaging Results (last 24 hours)     ** No results found for the last 24 hours. **            Results for orders placed or performed during the hospital encounter of 10/22/19   Blood Gas, Arterial With Co-Ox   Result Value Ref Range    Site Arterial Line     Michelet's Test N/A     pH, Arterial 7.404 7.350 - 7.450 pH units    pCO2, Arterial 41.2 35.0 - 45.0 mm Hg    pO2, Arterial 189.0 (H) 83.0 - 108.0 mm Hg    HCO3, Arterial 25.7 20.0 - 26.0 mmol/L    Base Excess, Arterial 0.9 0.0 - 2.0 mmol/L    O2 Saturation, Arterial 99.6 (H) 94.0 - 99.0 %    Hemoglobin, Blood Gas 12.6 (L) 14 - 18 g/dL    Hematocrit, Blood Gas 38.5 38.0 - 51.0 %    Oxyhemoglobin 97.8 94 - 99 %    Methemoglobin 1.10 0.00 - 3.00 %    Carboxyhemoglobin 0.7 0 - 5 %    A-a Gradiant 187.5 mmHg    Temperature 35.6 C    Sodium, Arterial 140 136 - 145 mmol/L    Potassium, Arterial 3.8 3.5 - 5.2 mmol/L    Ionized Calcium 4.53 (L) 4.60 - 5.40 mg/dL    Barometric Pressure for " Blood Gas 748 mmHg    Modality Ventilator     FIO2 60 %    Ventilator Mode AC     Note      Collected by SURGERY     pH, Temp Corrected      pCO2, Temperature Corrected      pO2, Temperature Corrected     Blood Gas, Arterial With Co-Ox   Result Value Ref Range    Site Arterial Line     Michelet's Test N/A     pH, Arterial 7.389 7.350 - 7.450 pH units    pCO2, Arterial 40.0 35.0 - 45.0 mm Hg    pO2, Arterial 199.0 (H) 83.0 - 108.0 mm Hg    HCO3, Arterial 24.1 20.0 - 26.0 mmol/L    Base Excess, Arterial -0.8 (L) 0.0 - 2.0 mmol/L    O2 Saturation, Arterial 99.5 (H) 94.0 - 99.0 %    Hemoglobin, Blood Gas 12.5 (L) 14 - 18 g/dL    Hematocrit, Blood Gas 38.2 38.0 - 51.0 %    Oxyhemoglobin 97.4 94 - 99 %    Methemoglobin 1.40 0.00 - 3.00 %    Carboxyhemoglobin 0.7 0 - 5 %    A-a Gradiant 179.5 mmHg    Temperature 35.7 C    Sodium, Arterial 140 136 - 145 mmol/L    Potassium, Arterial 4.2 3.5 - 5.2 mmol/L    Ionized Calcium 4.44 (L) 4.60 - 5.40 mg/dL    Barometric Pressure for Blood Gas 749 mmHg    Modality Ventilator     FIO2 60 %    Ventilator Mode AC     Note      Collected by SURGERY     pH, Temp Corrected      pCO2, Temperature Corrected      pO2, Temperature Corrected     CBC Auto Differential   Result Value Ref Range    WBC 16.55 (H) 3.40 - 10.80 10*3/mm3    RBC 4.10 (L) 4.14 - 5.80 10*6/mm3    Hemoglobin 12.8 (L) 13.0 - 17.7 g/dL    Hematocrit 36.9 (L) 37.5 - 51.0 %    MCV 90.0 79.0 - 97.0 fL    MCH 31.2 26.6 - 33.0 pg    MCHC 34.7 31.5 - 35.7 g/dL    RDW 12.3 12.3 - 15.4 %    RDW-SD 40.5 37.0 - 54.0 fl    MPV 10.4 6.0 - 12.0 fL    Platelets 222 140 - 450 10*3/mm3    Neutrophil % 87.2 (H) 42.7 - 76.0 %    Lymphocyte % 9.2 (L) 19.6 - 45.3 %    Monocyte % 3.0 (L) 5.0 - 12.0 %    Eosinophil % 0.1 (L) 0.3 - 6.2 %    Basophil % 0.2 0.0 - 1.5 %    Immature Grans % 0.3 0.0 - 0.5 %    Neutrophils, Absolute 14.42 (H) 1.70 - 7.00 10*3/mm3    Lymphocytes, Absolute 1.52 0.70 - 3.10 10*3/mm3    Monocytes, Absolute 0.50 0.10 - 0.90  10*3/mm3    Eosinophils, Absolute 0.02 0.00 - 0.40 10*3/mm3    Basophils, Absolute 0.04 0.00 - 0.20 10*3/mm3    Immature Grans, Absolute 0.05 0.00 - 0.05 10*3/mm3    nRBC 0.0 0.0 - 0.2 /100 WBC   CBC (No Diff)   Result Value Ref Range    WBC 10.05 3.40 - 10.80 10*3/mm3    RBC 3.45 (L) 4.14 - 5.80 10*6/mm3    Hemoglobin 11.0 (L) 13.0 - 17.7 g/dL    Hematocrit 31.7 (L) 37.5 - 51.0 %    MCV 91.9 79.0 - 97.0 fL    MCH 31.9 26.6 - 33.0 pg    MCHC 34.7 31.5 - 35.7 g/dL    RDW 12.7 12.3 - 15.4 %    RDW-SD 42.5 37.0 - 54.0 fl    MPV 10.8 6.0 - 12.0 fL    Platelets 176 140 - 450 10*3/mm3   Basic Metabolic Panel   Result Value Ref Range    Glucose 199 (H) 65 - 99 mg/dL    BUN 17 8 - 23 mg/dL    Creatinine 0.80 0.76 - 1.27 mg/dL    Sodium 138 136 - 145 mmol/L    Potassium 4.5 3.5 - 5.2 mmol/L    Chloride 104 98 - 107 mmol/L    CO2 26.0 22.0 - 29.0 mmol/L    Calcium 7.8 (L) 8.6 - 10.5 mg/dL    eGFR Non African Amer 95 >60 mL/min/1.73    BUN/Creatinine Ratio 21.3 7.0 - 25.0    Anion Gap 8.0 5.0 - 15.0 mmol/L   Type & Screen   Result Value Ref Range    ABO Type O     RH type Positive     Antibody Screen Negative     T&S Expiration Date 10/25/2019 11:59:59 PM      Assessment and Plan    Sp RALP  1. Check VAL Cr.  If ok will dc VAL drain  2. Regular diet  3. Likely DC this PM

## 2019-10-24 ENCOUNTER — NURSE TRIAGE (OUTPATIENT)
Dept: CALL CENTER | Facility: HOSPITAL | Age: 74
End: 2019-10-24

## 2019-10-24 NOTE — TELEPHONE ENCOUNTER
Caller has questions concerning drainage from VAL site, wanting to know if the drainage was normal.     Reason for Disposition  • [1] Caller has NON-URGENT question AND [2] triager unable to answer question    Additional Information  • Negative: [1] Major abdominal surgical incision AND [2] wound gaping open AND [3] visible internal organs  • Negative: Sounds like a life-threatening emergency to the triager  • Negative: [1] Bleeding from incision AND [2] won't stop after 10 minutes of direct pressure  • Negative: [1] Widespread rash AND [2] bright red, sunburn-like  • Negative: Severe pain in the incision  • Negative: [1] Suture came out early AND [2] wound gaping AND [3] < 48 hours since sutures placed  • Negative: [1] Incision gaping open AND [2] length of opening > 2 inches (5 cm)  • Negative: Patient sounds very sick or weak to the triager  • Negative: Sounds like a serious complication to the triager  • Negative: Fever > 100.5 F (38.1 C)  • Negative: [1] Incision looks infected (spreading redness, pain) AND [2] fever > 99.5 F (37.5 C)  • Negative: [1] Incision looks infected (spreading redness, pain) AND [2] large red area (> 2 in. or 5 cm)  • Negative: [1] Incision looks infected (spreading redness, pain) AND [2] face wound  • Negative: [1] Red streak runs from the incision AND [2] longer than 1 inch (2.5 cm)  • Negative: [1] Pus or bad-smelling fluid draining from incision AND [2] no fever  • Negative: [1] Post-op pain AND [2] not controlled with pain medications  • Negative: Dressing soaked with blood or body fluid (e.g., drainage)  • Negative: [1] Caller has URGENT question AND [2] triager unable to answer question  • Negative: [1] Small red area or streak AND [2] no fever  • Negative: [1] Clear or blood-tinged fluid draining from wound AND [2] no fever  • Negative: [1] 48 hours since surgery AND [2] wound is becoming more tender  • Negative: [1] Incision gaping open AND [2] on the face AND [3] > 48 hours  "since sutures placed  • Negative: [1] Incision gaping open AND [3] length of opening > 1/2 inch (1 cm) AND [3] > 48 hours since sutures placed  • Negative: Suture or staple removal is overdue  • Negative: [1] Suture or staple came out early AND [2] caller wants wound checked  • Negative: Pimple where a stitch comes through the skin  • Negative: Suture or staple came out early  • Negative: Suture removal date, questions about  • Negative: Skin tape (e.g., Steri-strips) removal, questions about  • Negative: Sutured or stapled surgical incision, questions about  • Negative: Surgical incision after sutures or staples removed, questions about    Answer Assessment - Initial Assessment Questions  1. SYMPTOM: \"What's the main symptom you're concerned about?\" (e.g., redness, pain, drainage)      Concerned about drainage  2. ONSET: \"When did ________  start?\"      today  3. SURGERY: \"What surgery was performed?\"      Prostrate surgery  4. DATE of SURGERY: \"When was surgery performed?\"       01/22/2019  5. INCISION SITE: \"Where is the incision located?\"       Side where VAL was inserted  6. REDNESS: \"Is there any redness at the incision site?\" If yes, ask: \"How wide across is the redness?\" (Inches, centimeters)       no  7. PAIN: \"Is there any pain?\" If so, ask: \"How bad is it?\"  (Scale 1-10; or mild, moderate, severe)      no  8. BLEEDING: \"Is there any bleeding?\" If so, ask: \"How much?\" and \"Where?\"      no  9. DRAINAGE: \"Is there any drainage from the incision site?\" If yes, ask: \"What color and how much?\" (e.g., red, cloudy, pus; drops, teaspoon)      Clear  From nursing was serous  10. FEVER: \"Do you have a fever?\" If so, ask: \"What is your temperature, how was it measured, and when did it start?\"       [id number]. OTHER SYMPTOMS: \"Do you have any other symptoms?\" (e.g., shaking chills, weakness, rash elsewhere on body)        none    Protocols used: POST-OP INCISION SYMPTOMS-ADULT-AH      "

## 2019-10-25 ENCOUNTER — TELEPHONE (OUTPATIENT)
Dept: UROLOGY | Facility: CLINIC | Age: 74
End: 2019-10-25

## 2019-10-25 LAB
CYTO UR: NORMAL
LAB AP CASE REPORT: NORMAL
LAB AP SYNOPTIC CHECKLIST: NORMAL
PATH REPORT.FINAL DX SPEC: NORMAL
PATH REPORT.GROSS SPEC: NORMAL

## 2019-10-25 NOTE — TELEPHONE ENCOUNTER
----- Message from MOODY Mcmahon sent at 10/25/2019 11:59 AM CDT -----  Tell the patient to stay hydrated, continue on stool softeners.  Eat small more frequent meals.  Walk as much as possible to help bowels wake up and move gas.  If he starts vomiting or develops a fever he will need to report to the ER.  Discontinue pain medicine and use Tylenol only as narcotics will only exacerbate the patient.      ----- Message -----  From: Arti Carroll LPN  Sent: 10/25/2019  10:34 AM  To: MOODY Mcmahon    Nonstop hiccups hasn't had bm and has had increased abd distention post prostatectomy

## 2019-10-25 NOTE — TELEPHONE ENCOUNTER
Called pt back and informed him instruction on how to get get his bowels to move and when he needs to report to ER for further treatment. Understands to stop the narco and just take tylenol for pain. I told him the myelocone drops can help with his hiccups, which is over the counter medication. Just monitor for vomiting and fever for that is when he needs to go to ER. Verified understanding.

## 2019-10-27 ENCOUNTER — APPOINTMENT (OUTPATIENT)
Dept: CT IMAGING | Facility: HOSPITAL | Age: 74
End: 2019-10-27

## 2019-10-27 ENCOUNTER — HOSPITAL ENCOUNTER (EMERGENCY)
Facility: HOSPITAL | Age: 74
Discharge: HOME | End: 2019-10-27
Attending: FAMILY MEDICINE | Admitting: FAMILY MEDICINE

## 2019-10-27 ENCOUNTER — NURSE TRIAGE (OUTPATIENT)
Dept: CALL CENTER | Facility: HOSPITAL | Age: 74
End: 2019-10-27

## 2019-10-27 VITALS
BODY MASS INDEX: 24.34 KG/M2 | DIASTOLIC BLOOD PRESSURE: 66 MMHG | OXYGEN SATURATION: 98 % | HEART RATE: 62 BPM | WEIGHT: 170 LBS | SYSTOLIC BLOOD PRESSURE: 124 MMHG | TEMPERATURE: 98.2 F | HEIGHT: 70 IN | RESPIRATION RATE: 16 BRPM

## 2019-10-27 DIAGNOSIS — K59.00 CONSTIPATION, UNSPECIFIED CONSTIPATION TYPE: Primary | ICD-10-CM

## 2019-10-27 LAB
ALBUMIN SERPL-MCNC: 3.5 G/DL (ref 3.5–5.2)
ALBUMIN/GLOB SERPL: 1.3 G/DL
ALP SERPL-CCNC: 82 U/L (ref 39–117)
ALT SERPL W P-5'-P-CCNC: 14 U/L (ref 1–41)
ANION GAP SERPL CALCULATED.3IONS-SCNC: 9 MMOL/L (ref 5–15)
APTT PPP: 26.5 SECONDS (ref 24.1–35)
AST SERPL-CCNC: 21 U/L (ref 1–40)
BACTERIA UR QL AUTO: ABNORMAL /HPF
BASOPHILS # BLD AUTO: 0.04 10*3/MM3 (ref 0–0.2)
BASOPHILS NFR BLD AUTO: 0.4 % (ref 0–1.5)
BILIRUB SERPL-MCNC: 0.6 MG/DL (ref 0.2–1.2)
BILIRUB UR QL STRIP: NEGATIVE
BUN BLD-MCNC: 14 MG/DL (ref 8–23)
BUN/CREAT SERPL: 19.7 (ref 7–25)
CALCIUM SPEC-SCNC: 8.7 MG/DL (ref 8.6–10.5)
CHLORIDE SERPL-SCNC: 101 MMOL/L (ref 98–107)
CLARITY UR: CLEAR
CO2 SERPL-SCNC: 30 MMOL/L (ref 22–29)
COLOR UR: YELLOW
CREAT BLD-MCNC: 0.71 MG/DL (ref 0.76–1.27)
DEPRECATED RDW RBC AUTO: 40.3 FL (ref 37–54)
EOSINOPHIL # BLD AUTO: 0.59 10*3/MM3 (ref 0–0.4)
EOSINOPHIL NFR BLD AUTO: 6.3 % (ref 0.3–6.2)
ERYTHROCYTE [DISTWIDTH] IN BLOOD BY AUTOMATED COUNT: 12.3 % (ref 12.3–15.4)
GFR SERPL CREATININE-BSD FRML MDRD: 109 ML/MIN/1.73
GLOBULIN UR ELPH-MCNC: 2.8 GM/DL
GLUCOSE BLD-MCNC: 100 MG/DL (ref 65–99)
GLUCOSE UR STRIP-MCNC: NEGATIVE MG/DL
HCT VFR BLD AUTO: 31.7 % (ref 37.5–51)
HGB BLD-MCNC: 10.9 G/DL (ref 13–17.7)
HGB UR QL STRIP.AUTO: ABNORMAL
IMM GRANULOCYTES # BLD AUTO: 0.05 10*3/MM3 (ref 0–0.05)
IMM GRANULOCYTES NFR BLD AUTO: 0.5 % (ref 0–0.5)
INR PPP: 1.07 (ref 0.91–1.09)
KETONES UR QL STRIP: NEGATIVE
LEUKOCYTE ESTERASE UR QL STRIP.AUTO: ABNORMAL
LIPASE SERPL-CCNC: 39 U/L (ref 13–60)
LYMPHOCYTES # BLD AUTO: 1.82 10*3/MM3 (ref 0.7–3.1)
LYMPHOCYTES NFR BLD AUTO: 19.5 % (ref 19.6–45.3)
MCH RBC QN AUTO: 30.8 PG (ref 26.6–33)
MCHC RBC AUTO-ENTMCNC: 34.4 G/DL (ref 31.5–35.7)
MCV RBC AUTO: 89.5 FL (ref 79–97)
MONOCYTES # BLD AUTO: 0.81 10*3/MM3 (ref 0.1–0.9)
MONOCYTES NFR BLD AUTO: 8.7 % (ref 5–12)
NEUTROPHILS # BLD AUTO: 6.02 10*3/MM3 (ref 1.7–7)
NEUTROPHILS NFR BLD AUTO: 64.6 % (ref 42.7–76)
NITRITE UR QL STRIP: NEGATIVE
NRBC BLD AUTO-RTO: 0 /100 WBC (ref 0–0.2)
PH UR STRIP.AUTO: >=9 [PH] (ref 5–8)
PLATELET # BLD AUTO: 239 10*3/MM3 (ref 140–450)
PMV BLD AUTO: 10.5 FL (ref 6–12)
POTASSIUM BLD-SCNC: 4 MMOL/L (ref 3.5–5.2)
PROT SERPL-MCNC: 6.3 G/DL (ref 6–8.5)
PROT UR QL STRIP: ABNORMAL
PROTHROMBIN TIME: 14.2 SECONDS (ref 11.9–14.6)
RBC # BLD AUTO: 3.54 10*6/MM3 (ref 4.14–5.8)
RBC # UR: ABNORMAL /HPF
REF LAB TEST METHOD: ABNORMAL
SODIUM BLD-SCNC: 140 MMOL/L (ref 136–145)
SP GR UR STRIP: 1.02 (ref 1–1.03)
SQUAMOUS #/AREA URNS HPF: ABNORMAL /HPF
UROBILINOGEN UR QL STRIP: ABNORMAL
WBC NRBC COR # BLD: 9.33 10*3/MM3 (ref 3.4–10.8)
WBC UR QL AUTO: ABNORMAL /HPF

## 2019-10-27 PROCEDURE — 85610 PROTHROMBIN TIME: CPT | Performed by: FAMILY MEDICINE

## 2019-10-27 PROCEDURE — 99284 EMERGENCY DEPT VISIT MOD MDM: CPT

## 2019-10-27 PROCEDURE — 85730 THROMBOPLASTIN TIME PARTIAL: CPT | Performed by: FAMILY MEDICINE

## 2019-10-27 PROCEDURE — 80053 COMPREHEN METABOLIC PANEL: CPT | Performed by: FAMILY MEDICINE

## 2019-10-27 PROCEDURE — 74177 CT ABD & PELVIS W/CONTRAST: CPT

## 2019-10-27 PROCEDURE — 25010000002 IOPAMIDOL 61 % SOLUTION: Performed by: FAMILY MEDICINE

## 2019-10-27 PROCEDURE — 81001 URINALYSIS AUTO W/SCOPE: CPT | Performed by: FAMILY MEDICINE

## 2019-10-27 PROCEDURE — 85025 COMPLETE CBC W/AUTO DIFF WBC: CPT | Performed by: FAMILY MEDICINE

## 2019-10-27 PROCEDURE — 83690 ASSAY OF LIPASE: CPT | Performed by: FAMILY MEDICINE

## 2019-10-27 RX ORDER — BACLOFEN 10 MG/1
10 TABLET ORAL 3 TIMES DAILY PRN
Qty: 20 TABLET | Refills: 0 | Status: ON HOLD | OUTPATIENT
Start: 2019-10-27 | End: 2022-10-24

## 2019-10-27 RX ORDER — OMEPRAZOLE 40 MG/1
40 CAPSULE, DELAYED RELEASE ORAL DAILY
Qty: 30 CAPSULE | Refills: 0 | Status: SHIPPED | OUTPATIENT
Start: 2019-10-27

## 2019-10-27 RX ADMIN — IOPAMIDOL 100 ML: 612 INJECTION, SOLUTION INTRAVENOUS at 14:59

## 2019-10-27 NOTE — TELEPHONE ENCOUNTER
Reviewed guidelines with caller, hiccup guideline advises he be seen within 4 hours. Advised caller they can go to Urgent Care when it opens for this. Post-op constipation guideline advises Fleet enema. Caller states he took one prior to surgery, she will give him one now.     Reason for Disposition  • [1] Hiccups present > 3 hours AND [2] severe AND [3] no improvement using hiccup treatment per protocol  • Last bowel movement (BM) > 4 days ago    Additional Information  • Negative: Chest pain  • Negative: Difficulty breathing  • Negative: [1] Abdomen pain is main symptom AND [2] adult male  • Negative: [1] Abdomen pain is main symptom AND [2] adult female  • Negative: Vomiting  • Negative: Patient sounds very sick or weak to the triager  • Negative: Sounds like a life-threatening emergency to the triager  • Negative: Chest pain  • Negative: Difficulty breathing  • Negative: Surgical incision symptoms and questions  • Negative: [1] Discomfort (pain, burning or stinging) when passing urine AND [2] male  • Negative: [1] Discomfort (pain, burning or stinging) when passing urine AND [2] female  • Constipation  • Negative: [1] Abdomen pain is main symptom AND [2] adult male  • Negative: [1] Abdomen pain is main symptom AND [2] adult female  • Negative: Rectal bleeding or blood in stool is main symptom  • Negative: Rectal pain or itching is main symptom  • Negative: Constipation in a cancer patient who is currently (or recently) receiving chemotherapy or radiation therapy, or cancer patient who has metastatic or end-stage cancer and is receiving palliative care  • Negative: Patient sounds very sick or weak to the triager  • Negative: [1] Vomiting AND [2] abdomen looks much more swollen than usual  • Negative: [1] Vomiting AND [2] contains bile (green color)  • Negative: [1] Constant abdominal pain AND [2] present > 2 hours  • Negative: [1] Rectal pain or fullness from fecal impaction (rectum full of stool) AND [2] NOT  "better after SITZ bath, suppository or enema  • Negative: [1] Intermittent mild abdominal pain AND [2] fever  • Negative: Abdomen is more swollen than usual    Answer Assessment - Initial Assessment Questions  1. ONSET: \"When did the hiccups begin?\"       Wednesday   2. SEVERITY: \"How bad are the hiccups now?\"  (Severe: unable to eat, drink or sleep because of hiccups)      Unable to sleep  3. TREATMENT: \"What have you done so far to treat the hiccups?\"      Sugar, drinking water, holding his breath  4. RECURRENT SYMPTOM: \"Have you ever had severe hiccups before?\" If so, ask: \"When was the last time?\" and \"What happened that time?\"       no  5. OTHER SYMPTOMS: \"Do you have any other symptoms? (e.g., chest pain, difficulty breathing,  abdominal pain, vomiting)      no  6. PREGNANCY: \"Is there any chance you are pregnant?\"      no    Answer Assessment - Initial Assessment Questions  1. SYMPTOM: \"What's the main symptom you're concerned about?\" (e.g., pain, fever, vomiting)      constipation  2. ONSET: \"When did ________  start?\"       Postop last BM last Monday  3. SURGERY: \"What surgery was performed?\"      prostatectomy  4. DATE of SURGERY: \"When was surgery performed?\"       Tuesday   5. ANESTHESIA: \" What type of anesthesia did you have?\" (e.g., general, spinal, epidural, local)      General  6. PAIN: \"Is there any pain?\" If so, ask: \"How bad is it?\"  (Scale 1-10; or mild, moderate, severe)      no  7. FEVER: \"Do you have a fever?\" If so, ask: \"What is your temperature, how was it measured, and when did it start?\"      no  8. VOMITING: \"Is there any vomiting?\" If yes, ask: \"How many times?\"      no  9. BLEEDING: \"Is there any bleeding?\" If so, ask: \"How much?\" and \"Where?\"      no  10. OTHER SYMPTOMS: \"Do you have any other symptoms?\" (e.g., drainage from wound, painful urination, constipation)        Constipation, drainage from incision    Answer Assessment - Initial Assessment Questions  1. STOOL PATTERN OR " "FREQUENCY: \"How often do you pass bowel movements (BMs)?\"  (Normal range: tid to q 3 days)  \"When was the last BM passed?\"        Every day, last Monday  2. STRAINING: \"Do you have to strain to have a BM?\"       yes  3. RECTAL PAIN: \"Does your rectum hurt when the stool comes out?\" If so, ask: \"Do you have hemorrhoids? How bad is the pain?\"  (Scale 1-10; or mild, moderate, severe)      No,no hx hemorrhoids   4. STOOL COMPOSITION: \"Are the stools hard?\"       No stools   5. BLOOD ON STOOLS: \"Has there been any blood on the toilet tissue or on the surface of the BM?\" If so, ask: \"When was the last time?\"       no  6. CHRONIC CONSTIPATION: \"Is this a new problem for you?\"  If no, ask: How long have you had this problem?\" (days, weeks, months)       New problem  7. CHANGES IN DIET: \"Have there been any recent changes in your diet?\"       Not eating as light, soft foods, poor appetite  8. MEDICATIONS: \"Have you been taking any new medications?\"      Narcotic pain med, has not taken any since Wednesday  9. LAXATIVES: \"Have you been using any laxatives or enemas?\"  If yes, ask \"What, how often, and when was the last time?\"      Stool softeners, MOM, prune juice, last night was last dose  10. CAUSE: \"What do you think is causing the constipation?\"         Postop possible narcotic analgesia   11. OTHER SYMPTOMS: \"Do you have any other symptoms?\" (e.g., abdominal pain, fever, vomiting)        hiccuping   12. PREGNANCY: \"Is there any chance you are pregnant?\" \"When was your last menstrual period?\"        na    Protocols used: HICCUPS-ADULT-AH, CONSTIPATION-ADULT-AH, POST-OP SYMPTOMS AND QUESTIONS-ADULT-AH      "

## 2019-10-29 NOTE — PROGRESS NOTES
Mr. Molina is 74 y.o. male    Chief Complaint   Patient presents with   • Follow-up     little bit of blood around the urethra   • Constipation     no BM since Sunday at ER visit   • Hiccups     since leaving the Hospital       History of Present Illness   Patient presents for follow-up after prostatectomy.  He denies any fevers, chills, nausea, vomiting.  He does complain of constipation and intractable hiccups.  He did visit the ER on Sunday.  A milk of molasses enema was administered and a large amount of stool was passed.  Patient reports he has not had a bowel movement since his ER visit.  His hiccups have improved since his bowel movement.    The following portions of the patient's history were reviewed and updated as appropriate: allergies, current medications, past family history, past medical history, past social history, past surgical history and problem list.    Review of Systems   Constitutional: Negative for chills, diaphoresis, fatigue and fever.   Gastrointestinal: Positive for constipation. Negative for abdominal distention, abdominal pain, nausea and vomiting.   Genitourinary: Negative for hematuria.   Neurological: Negative for dizziness, weakness and headaches.         Current Outpatient Medications:   •  aspirin 81 MG EC tablet, Take 81 mg by mouth Daily. WILL STOP 11/2/18, Disp: , Rfl:   •  baclofen (LIORESAL) 10 MG tablet, Take 1 tablet by mouth 3 (Three) Times a Day As Needed for Muscle Spasms. As needed for hiccups, Disp: 20 tablet, Rfl: 0  •  carvedilol (COREG) 3.125 MG tablet, Take 3.125 mg by mouth 2 (Two) Times a Day With Meals., Disp: , Rfl:   •  cephalexin (KEFLEX) 500 MG capsule, Take 1 capsule by mouth 3 (Three) Times a Day. Start day prior to cath removal, Disp: 9 capsule, Rfl: 0  •  docusate sodium (COLACE) 100 MG capsule, Take 1 capsule by mouth 2 (Two) Times a Day., Disp: 60 capsule, Rfl: 0  •  HYDROcodone-acetaminophen (NORCO) 7.5-325 MG per tablet, Take 1 tablet by mouth Every 6  (Six) Hours As Needed for Moderate Pain ., Disp: 16 tablet, Rfl: 0  •  Hyoscyamine Sulfate SL (LEVSIN/SL) 0.125 MG sublingual tablet, Take 0.125 mg by mouth Every 4 (Four) Hours As Needed (bladder spasms)., Disp: 21 each, Rfl: 1  •  isosorbide mononitrate (IMDUR) 30 MG 24 hr tablet, 30 mg Daily., Disp: , Rfl:   •  nitroglycerin (NITROSTAT) 0.4 MG SL tablet, Place 0.4 mg under the tongue Every 5 (Five) Minutes As Needed for Chest Pain., Disp: , Rfl:   •  omeprazole (priLOSEC) 40 MG capsule, Take 1 capsule by mouth Daily., Disp: 30 capsule, Rfl: 0  •  simvastatin (ZOCOR) 10 MG tablet, Take 10 mg by mouth Every Night., Disp: , Rfl:   •  chlorproMAZINE (THORAZINE) 25 MG tablet, Take 1 tablet by mouth 3 (Three) Times a Day., Disp: 30 tablet, Rfl: 1    Past Medical History:   Diagnosis Date   • Arthritis    • CHF (congestive heart failure) (CMS/HCC)     dr crow    • Childhood asthma    • Colon polyp    • Coronary artery disease    • Dysuria    • Elevated PSA    • GERD (gastroesophageal reflux disease)    • History of pulmonary embolism     yrs ago 1973   • Hyperlipidemia    • Myocardial infarction (CMS/HCC)    • Prostate cancer (CMS/HCC)    • Seborrheic keratosis 06/26/2019    Left [place]    • Shingles        Past Surgical History:   Procedure Laterality Date   • APPENDECTOMY     • CARDIAC DEFIBRILLATOR PLACEMENT     • CHOLECYSTECTOMY     • COLONOSCOPY     • COLONOSCOPY N/A 10/20/2017    Procedure: COLONOSCOPY WITH ANESTHESIA;  Surgeon: Donny Mock MD;  Location: [facility] ENDOSCOPY;  Service:    • CORONARY ARTERY BYPASS GRAFT  2011    [facility]   • ENDOSCOPY  04/06/2016   • ENDOSCOPY N/A 10/20/2017    Procedure: ESOPHAGOGASTRODUODENOSCOPY WITH ANESTHESIA;  Surgeon: Donny Mock MD;  Location: [facility] ENDOSCOPY;  Service:    • EXCISION LESION Left 06/26/2019    Left [place]  Path- Inflamed pigmented seborrheic keratosis   • HIP SURGERY Left    • KNEE ARTHROSCOPY Right 11/7/2018    Procedure:  "KNEE ARTHROSCOPY WITH MENISCECTOMY;  Surgeon: Hernesto Rodriguez MD;  Location:  [facility] OR;  Service: Orthopedics   • PACEMAKER IMPLANTATION      defibrillator also medtronic   • PROSTATECTOMY Bilateral 10/22/2019    Procedure: RADICAL PROSTATECTOMY LAPAROSCOPIC WITH DAVINCI SI ROBOT;  Surgeon: Haim Warren MD;  Location:  [facility] OR;  Service: [facility]       Social History     Socioeconomic History   • Marital status:      Spouse name: Not on file   • Number of children: Not on file   • Years of education: Not on file   • Highest education level: Not on file   Tobacco Use   • Smoking status: Former Smoker     Last attempt to quit: 10/20/1978     Years since quittin.0   • Smokeless tobacco: Never Used   Substance and Sexual Activity   • Alcohol use: No   • Drug use: No   • Sexual activity: Defer       Family History   Problem Relation Age of Onset   • Colon cancer Father        Objective    /62 (BP Location: Left arm, Patient Position: Sitting)   Pulse 67   Temp 98.7 °F (37.1 °C)   Resp 17   Ht 177.8 cm (70\")   Wt 74.8 kg (165 lb)   SpO2 99%   BMI 23.68 kg/m²     Physical Exam   Constitutional: He is oriented to person, place, and time. He appears well-developed and well-nourished.   HENT:   Head: Normocephalic.   Pulmonary/Chest: Effort normal. No respiratory distress.   Abdominal: He exhibits no distension.   Musculoskeletal: He exhibits no edema.   Neurological: He is alert and oriented to person, place, and time.   Skin: Skin is warm and dry.   Psychiatric: He has a normal mood and affect. His behavior is normal.   Vitals reviewed.    Patient's Body mass index is 23.68 kg/m². BMI is within normal parameters. No follow-up required..      Admission on 10/27/2019, Discharged on 10/27/2019   Component Date Value Ref Range Status   • Glucose 10/27/2019 100* 65 - 99 mg/dL Final   • BUN 10/27/2019 14  8 - 23 mg/dL Final   • Creatinine 10/27/2019 0.71* 0.76 - 1.27 mg/dL Final   • Sodium " 10/27/2019 140  136 - 145 mmol/L Final   • Potassium 10/27/2019 4.0  3.5 - 5.2 mmol/L Final   • Chloride 10/27/2019 101  98 - 107 mmol/L Final   • CO2 10/27/2019 30.0* 22.0 - 29.0 mmol/L Final   • Calcium 10/27/2019 8.7  8.6 - 10.5 mg/dL Final   • Total Protein 10/27/2019 6.3  6.0 - 8.5 g/dL Final   • Albumin 10/27/2019 3.50  3.50 - 5.20 g/dL Final   • ALT (SGPT) 10/27/2019 14  1 - 41 U/L Final   • AST (SGOT) 10/27/2019 21  1 - 40 U/L Final   • Alkaline Phosphatase 10/27/2019 82  39 - 117 U/L Final   • Total Bilirubin 10/27/2019 0.6  0.2 - 1.2 mg/dL Final   • eGFR Non African Amer 10/27/2019 109  >60 mL/min/1.73 Final   • Globulin 10/27/2019 2.8  gm/dL Final   • A/G Ratio 10/27/2019 1.3  g/dL Final   • BUN/Creatinine Ratio 10/27/2019 19.7  7.0 - 25.0 Final   • Anion Gap 10/27/2019 9.0  5.0 - 15.0 mmol/L Final   • Protime 10/27/2019 14.2  11.9 - 14.6 Seconds Final   • INR 10/27/2019 1.07  0.91 - 1.09 Final   • PTT 10/27/2019 26.5  24.1 - 35.0 seconds Final   • Lipase 10/27/2019 39  13 - 60 U/L Final   • Color, UA 10/27/2019 Yellow  Yellow, Straw Final   • Appearance, UA 10/27/2019 Clear  Clear Final   • pH, UA 10/27/2019 >=9.0* 5.0 - 8.0 Final   • Specific Gravity, UA 10/27/2019 1.020  1.005 - 1.030 Final   • Glucose, UA 10/27/2019 Negative  Negative Final   • Ketones, UA 10/27/2019 Negative  Negative Final   • Bilirubin, UA 10/27/2019 Negative  Negative Final   • Blood, UA 10/27/2019 Moderate (2+)* Negative Final   • Protein, UA 10/27/2019 30 mg/dL (1+)* Negative Final   • Leuk Esterase, UA 10/27/2019 Moderate (2+)* Negative Final   • Nitrite, UA 10/27/2019 Negative  Negative Final   • Urobilinogen, UA 10/27/2019 0.2 E.U./dL  0.2 - 1.0 E.U./dL Final   • WBC 10/27/2019 9.33  3.40 - 10.80 10*3/mm3 Final   • RBC 10/27/2019 3.54* 4.14 - 5.80 10*6/mm3 Final   • Hemoglobin 10/27/2019 10.9* 13.0 - 17.7 g/dL Final   • Hematocrit 10/27/2019 31.7* 37.5 - 51.0 % Final   • MCV 10/27/2019 89.5  79.0 - 97.0 fL Final   • MCH  10/27/2019 30.8  26.6 - 33.0 pg Final   • MCHC 10/27/2019 34.4  31.5 - 35.7 g/dL Final   • RDW 10/27/2019 12.3  12.3 - 15.4 % Final   • RDW-SD 10/27/2019 40.3  37.0 - 54.0 fl Final   • MPV 10/27/2019 10.5  6.0 - 12.0 fL Final   • Platelets 10/27/2019 239  140 - 450 10*3/mm3 Final   • Neutrophil % 10/27/2019 64.6  42.7 - 76.0 % Final   • Lymphocyte % 10/27/2019 19.5* 19.6 - 45.3 % Final   • Monocyte % 10/27/2019 8.7  5.0 - 12.0 % Final   • Eosinophil % 10/27/2019 6.3* 0.3 - 6.2 % Final   • Basophil % 10/27/2019 0.4  0.0 - 1.5 % Final   • Immature Grans % 10/27/2019 0.5  0.0 - 0.5 % Final   • Neutrophils, Absolute 10/27/2019 6.02  1.70 - 7.00 10*3/mm3 Final   • Lymphocytes, Absolute 10/27/2019 1.82  0.70 - 3.10 10*3/mm3 Final   • Monocytes, Absolute 10/27/2019 0.81  0.10 - 0.90 10*3/mm3 Final   • Eosinophils, Absolute 10/27/2019 0.59* 0.00 - 0.40 10*3/mm3 Final   • Basophils, Absolute 10/27/2019 0.04  0.00 - 0.20 10*3/mm3 Final   • Immature Grans, Absolute 10/27/2019 0.05  0.00 - 0.05 10*3/mm3 Final   • nRBC 10/27/2019 0.0  0.0 - 0.2 /100 WBC Final   • RBC, UA 10/27/2019 3-5* None Seen /HPF Final   • WBC, UA 10/27/2019 6-12* None Seen /HPF Final   • Bacteria, UA 10/27/2019 None Seen  None Seen /HPF Final   • Squamous Epithelial Cells, UA 10/27/2019 0-2  None Seen, 0-2 /HPF Final   • Methodology 10/27/2019 Manual Light Microscopy   Final       Results for orders placed or performed during the hospital encounter of 10/27/19   Comprehensive Metabolic Panel   Result Value Ref Range    Glucose 100 (H) 65 - 99 mg/dL    BUN 14 8 - 23 mg/dL    Creatinine 0.71 (L) 0.76 - 1.27 mg/dL    Sodium 140 136 - 145 mmol/L    Potassium 4.0 3.5 - 5.2 mmol/L    Chloride 101 98 - 107 mmol/L    CO2 30.0 (H) 22.0 - 29.0 mmol/L    Calcium 8.7 8.6 - 10.5 mg/dL    Total Protein 6.3 6.0 - 8.5 g/dL    Albumin 3.50 3.50 - 5.20 g/dL    ALT (SGPT) 14 1 - 41 U/L    AST (SGOT) 21 1 - 40 U/L    Alkaline Phosphatase 82 39 - 117 U/L    Total Bilirubin 0.6  0.2 - 1.2 mg/dL    eGFR Non African Amer 109 >60 mL/min/1.73    Globulin 2.8 gm/dL    A/G Ratio 1.3 g/dL    BUN/Creatinine Ratio 19.7 7.0 - 25.0    Anion Gap 9.0 5.0 - 15.0 mmol/L   Protime-INR   Result Value Ref Range    Protime 14.2 11.9 - 14.6 Seconds    INR 1.07 0.91 - 1.09   aPTT   Result Value Ref Range    PTT 26.5 24.1 - 35.0 seconds   Lipase   Result Value Ref Range    Lipase 39 13 - 60 U/L   Urinalysis With Culture If Indicated - Urine, Catheter   Result Value Ref Range    Color, UA Yellow Yellow, Straw    Appearance, UA Clear Clear    pH, UA >=9.0 (H) 5.0 - 8.0    Specific Gravity, UA 1.020 1.005 - 1.030    Glucose, UA Negative Negative    Ketones, UA Negative Negative    Bilirubin, UA Negative Negative    Blood, UA Moderate (2+) (A) Negative    Protein, UA 30 mg/dL (1+) (A) Negative    Leuk Esterase, UA Moderate (2+) (A) Negative    Nitrite, UA Negative Negative    Urobilinogen, UA 0.2 E.U./dL 0.2 - 1.0 E.U./dL   CBC Auto Differential   Result Value Ref Range    WBC 9.33 3.40 - 10.80 10*3/mm3    RBC 3.54 (L) 4.14 - 5.80 10*6/mm3    Hemoglobin 10.9 (L) 13.0 - 17.7 g/dL    Hematocrit 31.7 (L) 37.5 - 51.0 %    MCV 89.5 79.0 - 97.0 fL    MCH 30.8 26.6 - 33.0 pg    MCHC 34.4 31.5 - 35.7 g/dL    RDW 12.3 12.3 - 15.4 %    RDW-SD 40.3 37.0 - 54.0 fl    MPV 10.5 6.0 - 12.0 fL    Platelets 239 140 - 450 10*3/mm3    Neutrophil % 64.6 42.7 - 76.0 %    Lymphocyte % 19.5 (L) 19.6 - 45.3 %    Monocyte % 8.7 5.0 - 12.0 %    Eosinophil % 6.3 (H) 0.3 - 6.2 %    Basophil % 0.4 0.0 - 1.5 %    Immature Grans % 0.5 0.0 - 0.5 %    Neutrophils, Absolute 6.02 1.70 - 7.00 10*3/mm3    Lymphocytes, Absolute 1.82 0.70 - 3.10 10*3/mm3    Monocytes, Absolute 0.81 0.10 - 0.90 10*3/mm3    Eosinophils, Absolute 0.59 (H) 0.00 - 0.40 10*3/mm3    Basophils, Absolute 0.04 0.00 - 0.20 10*3/mm3    Immature Grans, Absolute 0.05 0.00 - 0.05 10*3/mm3    nRBC 0.0 0.0 - 0.2 /100 WBC   Urinalysis, Microscopic Only - Urine, Catheter   Result Value Ref  Range    RBC, UA 3-5 (A) None Seen /HPF    WBC, UA 6-12 (A) None Seen /HPF    Bacteria, UA None Seen None Seen /HPF    Squamous Epithelial Cells, UA 0-2 None Seen, 0-2 /HPF    Methodology Manual Light Microscopy         Assessment/Plan   Assessment and Plan    Zackery was seen today for follow-up, constipation and hiccups.    Diagnoses and all orders for this visit:    Prostate cancer (CMS/[facility])  -     POC Urinalysis Dipstick, Multipro    Hiccups  -     chlorproMAZINE (THORAZINE) 25 MG tablet; Take 1 tablet by mouth 3 (Three) Times a Day.      Patient presents for follow-up after prostatectomy on October 22. His catheter was removed and he was able to urinate in the office.       Dr. Warren has discussed pathology with patient prior to this visit.     Instructed patient to begin Kegels exercises, performing at least 100 per day to improve leaking.  He was also advised to seek treatment with our office or [facility] ER/PCP/Urgent care after hours if he develops fever, chills, or the inability to urinate. He voices understanding.     Follow up with Dr. Warren on 12/4 with PSA prior.

## 2019-10-30 ENCOUNTER — OFFICE VISIT (OUTPATIENT)
Dept: UROLOGY | Facility: CLINIC | Age: 74
End: 2019-10-30

## 2019-10-30 VITALS
BODY MASS INDEX: 23.62 KG/M2 | RESPIRATION RATE: 17 BRPM | WEIGHT: 165 LBS | HEIGHT: 70 IN | OXYGEN SATURATION: 99 % | SYSTOLIC BLOOD PRESSURE: 142 MMHG | TEMPERATURE: 98.7 F | HEART RATE: 67 BPM | DIASTOLIC BLOOD PRESSURE: 62 MMHG

## 2019-10-30 DIAGNOSIS — R06.6 HICCUPS: ICD-10-CM

## 2019-10-30 DIAGNOSIS — C61 PROSTATE CANCER (HCC): Primary | ICD-10-CM

## 2019-10-30 PROCEDURE — 99024 POSTOP FOLLOW-UP VISIT: CPT | Performed by: NURSE PRACTITIONER

## 2019-10-30 RX ORDER — CHLORPROMAZINE HYDROCHLORIDE 25 MG/1
25 TABLET, FILM COATED ORAL 3 TIMES DAILY
Qty: 30 TABLET | Refills: 1 | Status: ON HOLD | OUTPATIENT
Start: 2019-10-30 | End: 2022-10-24

## 2019-11-28 LAB
PSA FREE MFR SERPL: NORMAL %
PSA FREE SERPL-MCNC: <0.02 NG/ML
PSA SERPL-MCNC: <0.1 NG/ML (ref 0–4)

## 2019-12-04 ENCOUNTER — OFFICE VISIT (OUTPATIENT)
Dept: UROLOGY | Facility: CLINIC | Age: 74
End: 2019-12-04

## 2019-12-04 VITALS — BODY MASS INDEX: 23.62 KG/M2 | HEIGHT: 70 IN | TEMPERATURE: 98.3 F | WEIGHT: 165 LBS

## 2019-12-04 DIAGNOSIS — C61 PROSTATE CANCER (HCC): Primary | ICD-10-CM

## 2019-12-04 DIAGNOSIS — N39.3 MALE URINARY STRESS INCONTINENCE: ICD-10-CM

## 2019-12-04 DIAGNOSIS — N52.9 IMPOTENCE OF ORGANIC ORIGIN: ICD-10-CM

## 2019-12-04 PROCEDURE — 99024 POSTOP FOLLOW-UP VISIT: CPT | Performed by: UROLOGY

## 2020-01-06 ENCOUNTER — OFFICE VISIT (OUTPATIENT)
Dept: OTOLARYNGOLOGY | Facility: CLINIC | Age: 75
End: 2020-01-06

## 2020-01-06 VITALS
DIASTOLIC BLOOD PRESSURE: 79 MMHG | HEART RATE: 76 BPM | BODY MASS INDEX: 23.62 KG/M2 | SYSTOLIC BLOOD PRESSURE: 128 MMHG | TEMPERATURE: 98 F | RESPIRATION RATE: 20 BRPM | HEIGHT: 70 IN | WEIGHT: 165 LBS

## 2020-01-06 DIAGNOSIS — L82.0 INFLAMED SEBORRHEIC KERATOSIS: Primary | ICD-10-CM

## 2020-01-06 PROCEDURE — 99213 OFFICE O/P EST LOW 20 MIN: CPT | Performed by: OTOLARYNGOLOGY

## 2020-01-06 PROCEDURE — 93295 DEV INTERROG REMOTE 1/2/MLT: CPT | Performed by: CLINICAL NURSE SPECIALIST

## 2020-01-06 PROCEDURE — 93296 REM INTERROG EVL PM/IDS: CPT | Performed by: CLINICAL NURSE SPECIALIST

## 2020-01-06 NOTE — PROGRESS NOTES
Chief Complaint   Patient presents with   • Follow-up     exc lesion of left temple       Skin Cancer Follow-up and Surveillance Visit    Zackery Molina presents for a skin lesion/cancer surveillance and skin check following excision of a skin lesion of the left temple with linear closure on 6/25/19.     Subjective: Since surgery, He is doing well without complaints.  Symptoms denied postoperatively include pain, fever, chills, bleeding and drainage. The patient states the pain has ceased since surgery.     Patient presents for follow-up general head and neck skin examination.  Patient has no prior history of skin cancer. He has not noted recurrence.  The patient has not noted new or worrisome lesions of the head and neck.    Pathology review: Pathology is available.. Pathology demonstrates a diagnosis of inflamed pigmented seborrheic keratosis - margins were involved.    Dermatologist:  Cristian Agudelo MD    Additional Skin Cancer History: none    Past Medical History:   Diagnosis Date   • Arthritis    • CHF (congestive heart failure) (CMS/HCC)     dr crow    • Childhood asthma    • Colon polyp    • Coronary artery disease    • Dysuria    • Elevated PSA    • GERD (gastroesophageal reflux disease)    • History of pulmonary embolism     yrs ago 1973   • Hyperlipidemia    • Myocardial infarction (CMS/HCC)    • Prostate cancer (CMS/HCC)    • Seborrheic keratosis 06/26/2019    Left [place]    • Shingles      Past Surgical History:   Procedure Laterality Date   • APPENDECTOMY     • CARDIAC DEFIBRILLATOR PLACEMENT     • CHOLECYSTECTOMY     • COLONOSCOPY     • COLONOSCOPY N/A 10/20/2017    Procedure: COLONOSCOPY WITH ANESTHESIA;  Surgeon: Donny Mock MD;  Location:  [facility] ENDOSCOPY;  Service:    • CORONARY ARTERY BYPASS GRAFT  2011    [facility]   • ENDOSCOPY  04/06/2016   • ENDOSCOPY N/A 10/20/2017    Procedure: ESOPHAGOGASTRODUODENOSCOPY WITH ANESTHESIA;  Surgeon: Donny Mock MD;  Location:   [facility] ENDOSCOPY;  Service:    • EXCISION LESION Left 2019    Left [place]  Path- Inflamed pigmented seborrheic keratosis   • HIP SURGERY Left    • KNEE ARTHROSCOPY Right 2018    Procedure: KNEE ARTHROSCOPY WITH MENISCECTOMY;  Surgeon: Hernesto Rodriguez MD;  Location:  [facility] OR;  Service: Orthopedics   • PACEMAKER IMPLANTATION      defibrillator also medtronic   • PROSTATECTOMY Bilateral 10/22/2019    Procedure: RADICAL PROSTATECTOMY LAPAROSCOPIC WITH DAVINCI SI ROBOT;  Surgeon: Haim Warren MD;  Location:  [facility] OR;  Service: [facility]     Family History   Problem Relation Age of Onset   • Colon cancer Father      Social History     Tobacco Use   • Smoking status: Former Smoker     Last attempt to quit: 10/20/1978     Years since quittin.2   • Smokeless tobacco: Never Used   Substance Use Topics   • Alcohol use: No   • Drug use: No     Allergies:  Patient has no known allergies.    Current Outpatient Medications:   •  aspirin 81 MG EC tablet, Take 81 mg by mouth Daily. WILL STOP 18, Disp: , Rfl:   •  baclofen (LIORESAL) 10 MG tablet, Take 1 tablet by mouth 3 (Three) Times a Day As Needed for Muscle Spasms. As needed for hiccups, Disp: 20 tablet, Rfl: 0  •  carvedilol (COREG) 3.125 MG tablet, Take 3.125 mg by mouth 2 (Two) Times a Day With Meals., Disp: , Rfl:   •  chlorproMAZINE (THORAZINE) 25 MG tablet, Take 1 tablet by mouth 3 (Three) Times a Day., Disp: 30 tablet, Rfl: 1  •  isosorbide mononitrate (IMDUR) 30 MG 24 hr tablet, 30 mg Daily., Disp: , Rfl:   •  nitroglycerin (NITROSTAT) 0.4 MG SL tablet, Place 0.4 mg under the tongue Every 5 (Five) Minutes As Needed for Chest Pain., Disp: , Rfl:   •  omeprazole (priLOSEC) 40 MG capsule, Take 1 capsule by mouth Daily., Disp: 30 capsule, Rfl: 0  •  simvastatin (ZOCOR) 10 MG tablet, Take 10 mg by mouth Every Night., Disp: , Rfl:     Review of Systems   Constitutional: Negative for activity change, appetite change, chills, fatigue, fever and  "unexpected weight change.   HENT: Negative for congestion, dental problem, facial swelling and nosebleeds.    Eyes: Negative for discharge and redness.   Musculoskeletal: Negative for neck pain.   Skin: Negative for color change, pallor, rash and wound.   Hematological: Negative for adenopathy. Does not bruise/bleed easily.        I have reviewed and edited the CC/HPI/ROS/PFSH/Allergy/Medication information entered into the note by the patient/ancillary staff to accurately document the details of this visit.    Objective:  /79   Pulse 76   Temp 98 °F (36.7 °C)   Resp 20   Ht 177.8 cm (70\")   Wt 74.8 kg (165 lb)   BMI 23.68 kg/m²     Physical Exam   Constitutional: He is oriented to person, place, and time. He appears well-developed and well-nourished. He is cooperative. No distress.   HENT:   Head: Normocephalic and atraumatic.       Right Ear: External ear normal.   Left Ear: External ear normal.   Nose: Nose normal.   Eyes: Pupils are equal, round, and reactive to light. Conjunctivae, EOM and lids are normal. Right eye exhibits no discharge. Left eye exhibits no discharge. No scleral icterus.   Neck: Normal range of motion and phonation normal. Neck supple. No tracheal deviation present.   Pulmonary/Chest: Effort normal. No stridor. No respiratory distress.   Musculoskeletal: Normal range of motion. He exhibits no edema or deformity.   Lymphadenopathy:     He has no cervical adenopathy.   Neurological: He is alert and oriented to person, place, and time. He has normal strength. No cranial nerve deficit. Coordination normal.   Skin: Skin is warm and dry. No rash noted. He is not diaphoretic. No erythema. No pallor.   Psychiatric: He has a normal mood and affect. His speech is normal and behavior is normal. Judgment and thought content normal. Cognition and memory are normal.   Nursing note and vitals reviewed.    Before:      After:      Assessment:  Zackery was seen today for follow-up.    Diagnoses and " all orders for this visit:    Inflamed seborrheic keratosis        Plan:    We have discussed the possibility of local recurrence due to margin involvement. However, pathology is benign. He was instructed to keep regular scheduled exams with Dr. Agudelo. Photos were taken today.     Protect the incisions from sunlight. Sunlight to the incisions will cause permanent pigmentation to the incision line and make the incision more noticeable. After the incision has reepithelialized (typically 2-3 weeks after the procedure), you may begin to use sunscreen with an SPF of 15 or greater    I discussed the use of a silicone-based wound cream to the incisions to optimize the end result. Apply topically twice daily, or as directed, to help optimize wound healing and decrease redness.    We discussed the importance of routine skin checks with a dermatologist or your PCP every 6-12 months.     Return if symptoms worsen or fail to improve, for Recheck.      Ash Watkins MD  01/06/20  1:45 PM

## 2020-01-13 DIAGNOSIS — C61 PROSTATE CANCER (HCC): Primary | ICD-10-CM

## 2020-01-15 ENCOUNTER — RESULTS ENCOUNTER (OUTPATIENT)
Dept: UROLOGY | Facility: CLINIC | Age: 75
End: 2020-01-15

## 2020-01-15 DIAGNOSIS — C61 PROSTATE CANCER (HCC): ICD-10-CM

## 2020-01-20 ENCOUNTER — RESULTS ENCOUNTER (OUTPATIENT)
Dept: UROLOGY | Facility: CLINIC | Age: 75
End: 2020-01-20

## 2020-01-20 DIAGNOSIS — C61 PROSTATE CANCER (HCC): ICD-10-CM

## 2020-03-11 DIAGNOSIS — C61 PROSTATE CANCER (HCC): ICD-10-CM

## 2020-03-12 DIAGNOSIS — R97.20 ELEVATED PROSTATE SPECIFIC ANTIGEN (PSA): Primary | ICD-10-CM

## 2020-03-13 LAB — PSA SERPL-MCNC: <0.014 NG/ML (ref 0–4)

## 2020-03-16 NOTE — PROGRESS NOTES
Subjective    Mr. Molina is 74 y.o. male    Chief Complaint: Elevated PSA    History of Present Illness  PSA done 3/17/2020 ~ <0.014    Prostate Cancer  Pt. presents with history of prostate cancer diagnosed in October 2019. Current disease state isintermediate risk disease Pt. underwent robot assisted laparoscpic prostatectomy  Path showing T2a 7 adenocarcinoma of prostate with Negative surgical margins.   Pt.  having 0 ppd incontinence.  Associated voiding symptoms include No evidence of frequency, urgency .   Pt. Is having erectile dysfunction thatdoes not   to PDE5 inhibitors.  There has been no bone pain, weight loss, abdominal pain, back pain, or gross hematuria.   Most recent PSA see below.     Lab Results   Component Value Date    PSA <0.014 03/12/2020    PSA <0.1 11/27/2019    PSA 7.2 (H) 07/16/2019         The following portions of the patient's history were reviewed and updated as appropriate: allergies, current medications, past family history, past medical history, past social history, past surgical history and problem list.    Review of Systems   Constitutional: Negative for chills and fever.   Gastrointestinal: Negative for abdominal pain, anal bleeding and blood in stool.   Genitourinary: Positive for frequency and urgency. Negative for dysuria and hematuria.              Current Outpatient Medications:   •  aspirin 81 MG EC tablet, Take 81 mg by mouth Daily. WILL STOP 11/2/18, Disp: , Rfl:   •  baclofen (LIORESAL) 10 MG tablet, Take 1 tablet by mouth 3 (Three) Times a Day As Needed for Muscle Spasms. As needed for hiccups, Disp: 20 tablet, Rfl: 0  •  carvedilol (COREG) 3.125 MG tablet, Take 3.125 mg by mouth 2 (Two) Times a Day With Meals., Disp: , Rfl:   •  chlorproMAZINE (THORAZINE) 25 MG tablet, Take 1 tablet by mouth 3 (Three) Times a Day., Disp: 30 tablet, Rfl: 1  •  isosorbide mononitrate (IMDUR) 30 MG 24 hr tablet, 30 mg Daily., Disp: , Rfl:   •  nitroglycerin (NITROSTAT) 0.4 MG SL tablet,  Place 0.4 mg under the tongue Every 5 (Five) Minutes As Needed for Chest Pain., Disp: , Rfl:   •  omeprazole (priLOSEC) 40 MG capsule, Take 1 capsule by mouth Daily., Disp: 30 capsule, Rfl: 0  •  simvastatin (ZOCOR) 10 MG tablet, Take 10 mg by mouth Every Night., Disp: , Rfl:     Past Medical History:   Diagnosis Date   • Arthritis    • CHF (congestive heart failure) (CMS/HCC)     dr crow    • Childhood asthma    • Colon polyp    • Coronary artery disease    • Dysuria    • Elevated PSA    • GERD (gastroesophageal reflux disease)    • History of pulmonary embolism     yrs ago 1973   • Hyperlipidemia    • Myocardial infarction (CMS/HCC)    • Prostate cancer (CMS/HCC)    • Seborrheic keratosis 06/26/2019    Left Congregation    • Shingles        Past Surgical History:   Procedure Laterality Date   • APPENDECTOMY     • CARDIAC DEFIBRILLATOR PLACEMENT     • CHOLECYSTECTOMY     • COLONOSCOPY     • COLONOSCOPY N/A 10/20/2017    Procedure: COLONOSCOPY WITH ANESTHESIA;  Surgeon: Donny Mock MD;  Location: [facility] ENDOSCOPY;  Service:    • CORONARY ARTERY BYPASS GRAFT  2011    done at [place] dr gibson   • ENDOSCOPY  04/06/2016   • ENDOSCOPY N/A 10/20/2017    Procedure: ESOPHAGOGASTRODUODENOSCOPY WITH ANESTHESIA;  Surgeon: Donny Mock MD;  Location: [facility] ENDOSCOPY;  Service:    • EXCISION LESION Left 06/26/2019    Left Congregation  Path- Inflamed pigmented seborrheic keratosis   • HIP SURGERY Left    • KNEE ARTHROSCOPY Right 11/7/2018    Procedure: KNEE ARTHROSCOPY WITH MENISCECTOMY;  Surgeon: Hernesto Rodriguez MD;  Location: [facility] OR;  Service: Orthopedics   • PACEMAKER IMPLANTATION      defibrillator also [employer]   • PROSTATECTOMY Bilateral 10/22/2019    Procedure: RADICAL PROSTATECTOMY LAPAROSCOPIC WITH DAVINCI SI ROBOT;  Surgeon: Haim Warren MD;  Location: [facility] OR;  Service: DaVinci       Social History     Socioeconomic History   • Marital status:      Spouse name: Not on file   • Number  "of children: Not on file   • Years of education: Not on file   • Highest education level: Not on file   Tobacco Use   • Smoking status: Former Smoker     Last attempt to quit: 10/20/1978     Years since quittin.4   • Smokeless tobacco: Never Used   Substance and Sexual Activity   • Alcohol use: No   • Drug use: No   • Sexual activity: Defer       Family History   Problem Relation Age of Onset   • Colon cancer Father        Objective    Temp 98.5 °F (36.9 °C)   Ht 177.8 cm (70\")   Wt 76.8 kg (169 lb 6.4 oz)   BMI 24.31 kg/m²     Physical Exam   Constitutional: He is oriented to person, place, and time. He appears well-developed and well-nourished. No distress.   Pulmonary/Chest: Effort normal.   Abdominal: Soft. He exhibits no distension and no mass. There is no tenderness. There is no rebound and no guarding. No hernia.   Neurological: He is alert and oriented to person, place, and time.   Skin: Skin is warm and dry. He is not diaphoretic.   Psychiatric: He has a normal mood and affect.   Vitals reviewed.          Results for orders placed or performed in visit on 20   POC Urinalysis Dipstick, Multipro   Result Value Ref Range    Color Yellow Yellow, Straw, Dark Yellow, Sinai    Clarity, UA Clear Clear    Glucose, UA Negative Negative, 1000 mg/dL (3+) mg/dL    Bilirubin Negative Negative    Ketones, UA Negative Negative    Specific Gravity  1.020 1.005 - 1.030    Blood, UA Small (A) Negative    pH, Urine 5.5 5.0 - 8.0    Protein, POC Negative Negative mg/dL    Urobilinogen, UA Normal Normal    Nitrite, UA Negative Negative    Leukocytes Negative Negative     Assessment and Plan    Zackery was seen today for prostate cancer.    Diagnoses and all orders for this visit:    Elevated prostate specific antigen (PSA)  -     POC Urinalysis Dipstick, Multipro    Patient with favorable risk intermediate prostate cancer.  PSA was 7.2.  He underwent truss biopsy on 2019 was found to have [place] 3+4 = 7 " adenocarcinoma the prostate in 1 out of 12 cores at the right base.      He underwent robotic prostatectomy on October 22, 2019 which revealed [place] 3+4 = 7 adenocarcinoma prostate with negative surgical margins.  Postoperative PSA is undetectable.     ORSENDO has resolved.        He is on isosorbide mononitrate which precludes the use of Viagra or Cialis.  Not interested in injection therapy at this point.     Follow-up in 3 monts with repeat PSA.

## 2020-03-17 ENCOUNTER — OFFICE VISIT (OUTPATIENT)
Dept: UROLOGY | Facility: CLINIC | Age: 75
End: 2020-03-17

## 2020-03-17 VITALS — TEMPERATURE: 98.5 F | HEIGHT: 70 IN | BODY MASS INDEX: 24.25 KG/M2 | WEIGHT: 169.4 LBS

## 2020-03-17 DIAGNOSIS — C61 PROSTATE CANCER (HCC): Primary | ICD-10-CM

## 2020-03-17 DIAGNOSIS — N52.9 IMPOTENCE OF ORGANIC ORIGIN: ICD-10-CM

## 2020-03-17 PROCEDURE — 81003 URINALYSIS AUTO W/O SCOPE: CPT | Performed by: UROLOGY

## 2020-03-17 PROCEDURE — 99213 OFFICE O/P EST LOW 20 MIN: CPT | Performed by: UROLOGY

## 2020-03-31 ENCOUNTER — TELEPHONE (OUTPATIENT)
Dept: CARDIOLOGY | Age: 75
End: 2020-03-31

## 2020-04-01 ENCOUNTER — TELEPHONE (OUTPATIENT)
Dept: CARDIOLOGY | Age: 75
End: 2020-04-01

## 2020-04-07 ENCOUNTER — TELEPHONE (OUTPATIENT)
Dept: CARDIOLOGY | Age: 75
End: 2020-04-07

## 2020-04-07 NOTE — TELEPHONE ENCOUNTER
I left a message for the patient regarding missed [facility] reading, advised the patient to call office back.

## 2020-05-12 RX ORDER — ISOSORBIDE MONONITRATE 30 MG/1
TABLET, EXTENDED RELEASE ORAL
Qty: 90 TABLET | Refills: 3 | Status: SHIPPED | OUTPATIENT
Start: 2020-05-12 | End: 2021-04-30

## 2020-06-09 DIAGNOSIS — C61 PROSTATE CANCER (HCC): ICD-10-CM

## 2020-06-11 LAB — PSA SERPL-MCNC: <0.014 NG/ML (ref 0–4)

## 2020-06-12 NOTE — PROGRESS NOTES
Subjective    Mr. Molina is 74 y.o. male    Chief Complaint: Prostate Cancer.    History of Present Illness  Pt. presents with history of prostate cancer diagnosed in October 2019. Current disease state isintermediate risk disease Pt. underwent robot assisted laparoscpic prostatectomy  Path showing T2a 7 adenocarcinoma of prostate with Negative surgical margins.   Pt.  having 0 ppd incontinence.  Associated voiding symptoms include No evidence of frequency, urgency .   Pt. Is having erectile dysfunction thatdoes not   to PDE5 inhibitors.  There has been no bone pain, weight loss, abdominal pain, back pain, or gross hematuria.     Lab Results   Component Value Date    PSA <0.014 06/10/2020    PSA <0.014 03/12/2020    PSA <0.1 11/27/2019         The following portions of the patient's history were reviewed and updated as appropriate: allergies, current medications, past family history, past medical history, past social history, past surgical history and problem list.    Review of Systems   Constitutional: Negative for chills and fever.   Gastrointestinal: Negative for abdominal pain, anal bleeding and blood in stool.   Genitourinary: Negative for dysuria, frequency, hematuria and urgency.         Current Outpatient Medications:   •  aspirin 81 MG EC tablet, Take 81 mg by mouth Daily. WILL STOP 11/2/18, Disp: , Rfl:   •  baclofen (LIORESAL) 10 MG tablet, Take 1 tablet by mouth 3 (Three) Times a Day As Needed for Muscle Spasms. As needed for hiccups, Disp: 20 tablet, Rfl: 0  •  carvedilol (COREG) 3.125 MG tablet, Take 3.125 mg by mouth 2 (Two) Times a Day With Meals., Disp: , Rfl:   •  chlorproMAZINE (THORAZINE) 25 MG tablet, Take 1 tablet by mouth 3 (Three) Times a Day., Disp: 30 tablet, Rfl: 1  •  isosorbide mononitrate (IMDUR) 30 MG 24 hr tablet, 30 mg Daily., Disp: , Rfl:   •  nitroglycerin (NITROSTAT) 0.4 MG SL tablet, Place 0.4 mg under the tongue Every 5 (Five) Minutes As Needed for Chest Pain., Disp: , Rfl:   •   omeprazole (priLOSEC) 40 MG capsule, Take 1 capsule by mouth Daily., Disp: 30 capsule, Rfl: 0  •  simvastatin (ZOCOR) 10 MG tablet, Take 10 mg by mouth Every Night., Disp: , Rfl:     Past Medical History:   Diagnosis Date   • Arthritis    • CHF (congestive heart failure) (CMS/HCC)     dr crow    • Childhood asthma    • Colon polyp    • Coronary artery disease    • Dysuria    • Elevated PSA    • GERD (gastroesophageal reflux disease)    • History of pulmonary embolism     yrs ago 1973   • Hyperlipidemia    • Myocardial infarction (CMS/HCC)    • Prostate cancer (CMS/HCC)    • Seborrheic keratosis 06/26/2019    Left Mosque    • Shingles        Past Surgical History:   Procedure Laterality Date   • APPENDECTOMY     • CARDIAC DEFIBRILLATOR PLACEMENT     • CHOLECYSTECTOMY     • COLONOSCOPY     • COLONOSCOPY N/A 10/20/2017    Procedure: COLONOSCOPY WITH ANESTHESIA;  Surgeon: Donny Mock MD;  Location: [facility] ENDOSCOPY;  Service:    • CORONARY ARTERY BYPASS GRAFT  2011    done at [facility] dr gibson   • ENDOSCOPY  04/06/2016   • ENDOSCOPY N/A 10/20/2017    Procedure: ESOPHAGOGASTRODUODENOSCOPY WITH ANESTHESIA;  Surgeon: Donny Mock MD;  Location: [facility] ENDOSCOPY;  Service:    • EXCISION LESION Left 06/26/2019    Left Mosque  Path- Inflamed pigmented seborrheic keratosis   • HIP SURGERY Left    • KNEE ARTHROSCOPY Right 11/7/2018    Procedure: KNEE ARTHROSCOPY WITH MENISCECTOMY;  Surgeon: Hernesto Rodriguez MD;  Location: [facility] OR;  Service: Orthopedics   • PACEMAKER IMPLANTATION      defibrillator also [employer]   • PROSTATECTOMY Bilateral 10/22/2019    Procedure: RADICAL PROSTATECTOMY LAPAROSCOPIC WITH DAVINCI SI ROBOT;  Surgeon: Haim Warren MD;  Location: [facility] OR;  Service: [facility]       Social History     Socioeconomic History   • Marital status:      Spouse name: Not on file   • Number of children: Not on file   • Years of education: Not on file   • Highest education level: Not on  "file   Tobacco Use   • Smoking status: Former Smoker     Last attempt to quit: 10/20/1978     Years since quittin.6   • Smokeless tobacco: Never Used   Substance and Sexual Activity   • Alcohol use: No   • Drug use: No   • Sexual activity: Defer       Family History   Problem Relation Age of Onset   • Colon cancer Father        Objective    Temp 97.7 °F (36.5 °C) (Temporal)   Ht 177.8 cm (70\")   Wt 78.4 kg (172 lb 12.8 oz)   BMI 24.79 kg/m²     Physical Exam   Constitutional: He is oriented to person, place, and time. He appears well-developed and well-nourished. No distress.   Pulmonary/Chest: Effort normal.   Abdominal: Soft. He exhibits no distension and no mass. There is no tenderness. There is no rebound and no guarding. No hernia.   Neurological: He is alert and oriented to person, place, and time.   Skin: Skin is warm and dry. He is not diaphoretic.   Psychiatric: He has a normal mood and affect.   Vitals reviewed.          Results for orders placed or performed in visit on 20   POC Urinalysis Dipstick, Multipro   Result Value Ref Range    Color Yellow Yellow, Straw, Dark Yellow, Sinai    Clarity, UA Clear Clear    Glucose, UA Negative Negative, 1000 mg/dL (3+) mg/dL    Bilirubin Negative Negative    Ketones, UA Negative Negative    Specific Gravity  1.010 1.005 - 1.030    Blood, UA Trace (A) Negative    pH, Urine 6.5 5.0 - 8.0    Protein, POC Negative Negative mg/dL    Urobilinogen, UA Normal Normal    Nitrite, UA Negative Negative    Leukocytes Negative Negative     Assessment and Plan    Diagnoses and all orders for this visit:    Prostate cancer (CMS/[facility])  -     POC Urinalysis Dipstick, Multipro  -     PSA DIAGNOSTIC; Future  -     PSA DIAGNOSTIC; Future    Impotence of organic origin    Patient with favorable risk intermediate prostate cancer.  PSA was 7.2.  He underwent truss biopsy on 2019 was found to have [place] 3+4 = 7 adenocarcinoma the prostate in 1 out of 12 cores at the right " base.      He underwent robotic prostatectomy on October 22, 2019 which revealed [place] 3+4 = 7 adenocarcinoma prostate with negative surgical margins.  Postoperative PSA is undetectable.     ROSENDO has resolved.        He is on isosorbide mononitrate which precludes the use of Viagra or Cialis.  Not interested in injection therapy at this point.     Follow-up in 6 months with repeat PSA.

## 2020-06-17 ENCOUNTER — OFFICE VISIT (OUTPATIENT)
Dept: UROLOGY | Facility: CLINIC | Age: 75
End: 2020-06-17

## 2020-06-17 VITALS — WEIGHT: 172.8 LBS | HEIGHT: 70 IN | TEMPERATURE: 97.7 F | BODY MASS INDEX: 24.74 KG/M2

## 2020-06-17 DIAGNOSIS — C61 PROSTATE CANCER (HCC): Primary | ICD-10-CM

## 2020-06-17 DIAGNOSIS — N52.9 IMPOTENCE OF ORGANIC ORIGIN: ICD-10-CM

## 2020-06-17 LAB
BILIRUB BLD-MCNC: NEGATIVE MG/DL
CLARITY, POC: CLEAR
COLOR UR: YELLOW
GLUCOSE UR STRIP-MCNC: NEGATIVE MG/DL
KETONES UR QL: NEGATIVE
LEUKOCYTE EST, POC: NEGATIVE
NITRITE UR-MCNC: NEGATIVE MG/ML
PH UR: 6.5 [PH] (ref 5–8)
PROT UR STRIP-MCNC: NEGATIVE MG/DL
RBC # UR STRIP: ABNORMAL /UL
SP GR UR: 1.01 (ref 1–1.03)
UROBILINOGEN UR QL: NORMAL

## 2020-06-17 PROCEDURE — 81003 URINALYSIS AUTO W/O SCOPE: CPT | Performed by: UROLOGY

## 2020-06-17 PROCEDURE — 99213 OFFICE O/P EST LOW 20 MIN: CPT | Performed by: UROLOGY

## 2020-06-23 ENCOUNTER — OFFICE VISIT (OUTPATIENT)
Dept: CARDIOLOGY | Age: 75
End: 2020-06-23
Payer: MEDICARE

## 2020-06-23 VITALS
HEART RATE: 73 BPM | DIASTOLIC BLOOD PRESSURE: 82 MMHG | SYSTOLIC BLOOD PRESSURE: 128 MMHG | BODY MASS INDEX: 24.34 KG/M2 | HEIGHT: 70 IN | WEIGHT: 170 LBS

## 2020-06-23 PROCEDURE — 4040F PNEUMOC VAC/ADMIN/RCVD: CPT | Performed by: CLINICAL NURSE SPECIALIST

## 2020-06-23 PROCEDURE — G8427 DOCREV CUR MEDS BY ELIG CLIN: HCPCS | Performed by: CLINICAL NURSE SPECIALIST

## 2020-06-23 PROCEDURE — 1036F TOBACCO NON-USER: CPT | Performed by: CLINICAL NURSE SPECIALIST

## 2020-06-23 PROCEDURE — 3017F COLORECTAL CA SCREEN DOC REV: CPT | Performed by: CLINICAL NURSE SPECIALIST

## 2020-06-23 PROCEDURE — 99213 OFFICE O/P EST LOW 20 MIN: CPT | Performed by: CLINICAL NURSE SPECIALIST

## 2020-06-23 PROCEDURE — G8420 CALC BMI NORM PARAMETERS: HCPCS | Performed by: CLINICAL NURSE SPECIALIST

## 2020-06-23 PROCEDURE — 93289 INTERROG DEVICE EVAL HEART: CPT | Performed by: CLINICAL NURSE SPECIALIST

## 2020-06-23 PROCEDURE — 1123F ACP DISCUSS/DSCN MKR DOCD: CPT | Performed by: CLINICAL NURSE SPECIALIST

## 2020-06-23 RX ORDER — SIMVASTATIN 10 MG
TABLET ORAL
Qty: 90 TABLET | Refills: 1 | Status: ON HOLD | OUTPATIENT
Start: 2020-06-23 | End: 2020-07-15 | Stop reason: HOSPADM

## 2020-06-23 NOTE — PATIENT INSTRUCTIONS
Plan  Call the [employer] # to trouble shoot your home monitor   Follow up in 6 mos With Dr. Charlie Sol   Call with any questions or concerns  Follow up with Mervat Aponte MD for non cardiac problems  Report any new problems  Cardiovascular Fitness-Exercise as tolerated. Strive for 30 minutes of exercise most days of the week. Cardiac / Healthy Diet  Continue current medications as directed  Continue plan of treatment  It is always recommended that you bring your medications bottles with you to each visit - this is for your safety!

## 2020-06-23 NOTE — PROGRESS NOTES
no color change or rash. No pallor. No new surgical incision. Neurologic - no speech difficulty, facial asymmetry or lateralizing weakness. No seizures, presyncope, syncope, or significant dizziness. Hematologic - no easy bruising or excessive bleeding. Psychiatric - no severe anxiety or insomnia. No confusion. All other review of systems are negative. Objective  Vital Signs - /82   Pulse 73   Ht 5' 10\" (1.778 m)   Wt 170 lb (77.1 kg)   BMI 24.39 kg/m²   General - Avel Goldmann is alert, cooperative, and pleasant. Well groomed. No acute distress. Body habitus is normal.  HEENT - The head is normocephalic. No circumoral cyanosis. Dentition is normal.   EYES -  No Xanthelasma, no arcus senilis, no conjunctival hemorrhages or discharge. Neck - Supple, without increased jugular venous pressures. No carotid bruits. No mass. Respiratory - Lungs are clear bilaterally. No wheezes or rales. Normal effort without use of accessory muscles. Cardiovascular - Heart has regular rhythm and rate. No murmurs, rubs or gallops. + pedal pulses and no varicosities. Abdominal -  Soft, nontender, nondistended. Bowel sounds are intact. Extremities - No clubbing, cyanosis, or  edema. Musculoskeletal -  No clubbing . No Osler's nodes. Gait normal .  No kyphosis or scoliosis. Skin -  no statis ulcers or dermatitis. Neurological - No focal signs are identified. Oriented to person, place and time. Psychiatric -  Appropriate affect and mood. Assessment:     Diagnosis Orders   1. Coronary artery disease involving native coronary artery of native heart without angina pectoris     2. AICD (automatic cardioverter/defibrillator) present     3. Ischemic cardiomyopathy     4.  Essential hypertension       Data:  BP Readings from Last 3 Encounters:   06/23/20 128/82   10/03/19 122/68   03/28/19 102/64    Pulse Readings from Last 3 Encounters:   06/23/20 73   10/03/19 70   03/28/19 72        Wt

## 2020-07-13 ENCOUNTER — APPOINTMENT (OUTPATIENT)
Dept: CT IMAGING | Age: 75
DRG: 440 | End: 2020-07-13
Payer: MEDICARE

## 2020-07-13 ENCOUNTER — APPOINTMENT (OUTPATIENT)
Dept: GENERAL RADIOLOGY | Age: 75
DRG: 440 | End: 2020-07-13
Payer: MEDICARE

## 2020-07-13 ENCOUNTER — HOSPITAL ENCOUNTER (INPATIENT)
Age: 75
LOS: 1 days | Discharge: HOME | DRG: 440 | End: 2020-07-15
Attending: EMERGENCY MEDICINE | Admitting: FAMILY MEDICINE
Payer: MEDICARE

## 2020-07-13 LAB
ALBUMIN SERPL-MCNC: 4.4 G/DL (ref 3.5–5.2)
ALP BLD-CCNC: 93 U/L (ref 40–130)
ALT SERPL-CCNC: 21 U/L (ref 5–41)
ANION GAP SERPL CALCULATED.3IONS-SCNC: 11 MMOL/L (ref 7–19)
AST SERPL-CCNC: 21 U/L (ref 5–40)
BACTERIA: NEGATIVE /HPF
BASOPHILS ABSOLUTE: 0.1 K/UL (ref 0–0.2)
BASOPHILS RELATIVE PERCENT: 0.5 % (ref 0–1)
BILIRUB SERPL-MCNC: 0.3 MG/DL (ref 0.2–1.2)
BILIRUBIN URINE: NEGATIVE
BLOOD, URINE: ABNORMAL
BUN BLDV-MCNC: 16 MG/DL (ref 8–23)
CALCIUM SERPL-MCNC: 9 MG/DL (ref 8.8–10.2)
CHLORIDE BLD-SCNC: 103 MMOL/L (ref 98–111)
CLARITY: CLEAR
CO2: 26 MMOL/L (ref 22–29)
COLOR: YELLOW
CREAT SERPL-MCNC: 0.8 MG/DL (ref 0.5–1.2)
D DIMER: 0.92 UG/ML FEU (ref 0–0.48)
EOSINOPHILS ABSOLUTE: 0.3 K/UL (ref 0–0.6)
EOSINOPHILS RELATIVE PERCENT: 2.9 % (ref 0–5)
EPITHELIAL CELLS, UA: 0 /HPF (ref 0–5)
GFR NON-AFRICAN AMERICAN: >60
GLUCOSE BLD-MCNC: 124 MG/DL (ref 74–109)
GLUCOSE URINE: NEGATIVE MG/DL
HCT VFR BLD CALC: 39.3 % (ref 42–52)
HEMOGLOBIN: 13.3 G/DL (ref 14–18)
HYALINE CASTS: 0 /HPF (ref 0–8)
IMMATURE GRANULOCYTES #: 0 K/UL
KETONES, URINE: NEGATIVE MG/DL
LEUKOCYTE ESTERASE, URINE: NEGATIVE
LIPASE: 188 U/L (ref 13–60)
LYMPHOCYTES ABSOLUTE: 3.2 K/UL (ref 1.1–4.5)
LYMPHOCYTES RELATIVE PERCENT: 27.1 % (ref 20–40)
MCH RBC QN AUTO: 31.7 PG (ref 27–31)
MCHC RBC AUTO-ENTMCNC: 33.8 G/DL (ref 33–37)
MCV RBC AUTO: 93.6 FL (ref 80–94)
MONOCYTES ABSOLUTE: 0.8 K/UL (ref 0–0.9)
MONOCYTES RELATIVE PERCENT: 6.4 % (ref 0–10)
NEUTROPHILS ABSOLUTE: 7.5 K/UL (ref 1.5–7.5)
NEUTROPHILS RELATIVE PERCENT: 62.8 % (ref 50–65)
NITRITE, URINE: NEGATIVE
PDW BLD-RTO: 12.5 % (ref 11.5–14.5)
PH UA: 5.5 (ref 5–8)
PLATELET # BLD: 239 K/UL (ref 130–400)
PMV BLD AUTO: 10.2 FL (ref 9.4–12.4)
POTASSIUM SERPL-SCNC: 4 MMOL/L (ref 3.5–5)
PROTEIN UA: NEGATIVE MG/DL
RBC # BLD: 4.2 M/UL (ref 4.7–6.1)
RBC UA: 1 /HPF (ref 0–4)
SODIUM BLD-SCNC: 140 MMOL/L (ref 136–145)
SPECIFIC GRAVITY UA: 1.02 (ref 1–1.03)
TOTAL PROTEIN: 6.8 G/DL (ref 6.6–8.7)
TROPONIN: <0.01 NG/ML (ref 0–0.03)
TROPONIN: <0.01 NG/ML (ref 0–0.03)
UROBILINOGEN, URINE: 0.2 E.U./DL
WBC # BLD: 11.9 K/UL (ref 4.8–10.8)
WBC UA: 0 /HPF (ref 0–5)

## 2020-07-13 PROCEDURE — 99285 EMERGENCY DEPT VISIT HI MDM: CPT

## 2020-07-13 PROCEDURE — 85379 FIBRIN DEGRADATION QUANT: CPT

## 2020-07-13 PROCEDURE — 84484 ASSAY OF TROPONIN QUANT: CPT

## 2020-07-13 PROCEDURE — 6360000004 HC RX CONTRAST MEDICATION: Performed by: EMERGENCY MEDICINE

## 2020-07-13 PROCEDURE — 71275 CT ANGIOGRAPHY CHEST: CPT

## 2020-07-13 PROCEDURE — 80053 COMPREHEN METABOLIC PANEL: CPT

## 2020-07-13 PROCEDURE — 81001 URINALYSIS AUTO W/SCOPE: CPT

## 2020-07-13 PROCEDURE — 71045 X-RAY EXAM CHEST 1 VIEW: CPT

## 2020-07-13 PROCEDURE — 36415 COLL VENOUS BLD VENIPUNCTURE: CPT

## 2020-07-13 PROCEDURE — 96375 TX/PRO/DX INJ NEW DRUG ADDON: CPT

## 2020-07-13 PROCEDURE — 74177 CT ABD & PELVIS W/CONTRAST: CPT

## 2020-07-13 PROCEDURE — 96374 THER/PROPH/DIAG INJ IV PUSH: CPT

## 2020-07-13 PROCEDURE — 83690 ASSAY OF LIPASE: CPT

## 2020-07-13 PROCEDURE — 6360000002 HC RX W HCPCS: Performed by: EMERGENCY MEDICINE

## 2020-07-13 PROCEDURE — 93005 ELECTROCARDIOGRAM TRACING: CPT | Performed by: EMERGENCY MEDICINE

## 2020-07-13 PROCEDURE — 85025 COMPLETE CBC W/AUTO DIFF WBC: CPT

## 2020-07-13 RX ORDER — MORPHINE SULFATE 4 MG/ML
4 INJECTION, SOLUTION INTRAMUSCULAR; INTRAVENOUS ONCE
Status: COMPLETED | OUTPATIENT
Start: 2020-07-13 | End: 2020-07-13

## 2020-07-13 RX ORDER — ONDANSETRON 2 MG/ML
4 INJECTION INTRAMUSCULAR; INTRAVENOUS ONCE
Status: COMPLETED | OUTPATIENT
Start: 2020-07-13 | End: 2020-07-13

## 2020-07-13 RX ADMIN — ONDANSETRON 4 MG: 2 INJECTION INTRAMUSCULAR; INTRAVENOUS at 21:55

## 2020-07-13 RX ADMIN — MORPHINE SULFATE 4 MG: 4 INJECTION INTRAVENOUS at 21:55

## 2020-07-13 RX ADMIN — IOPAMIDOL 90 ML: 755 INJECTION, SOLUTION INTRAVENOUS at 22:36

## 2020-07-13 ASSESSMENT — ENCOUNTER SYMPTOMS
NAUSEA: 1
DIARRHEA: 0
VOMITING: 1
ABDOMINAL PAIN: 1
BACK PAIN: 0
SORE THROAT: 0
RHINORRHEA: 0
SHORTNESS OF BREATH: 0

## 2020-07-13 ASSESSMENT — PAIN SCALES - GENERAL: PAINLEVEL_OUTOF10: 4

## 2020-07-14 PROBLEM — R11.2 NAUSEA & VOMITING: Status: ACTIVE | Noted: 2020-07-14

## 2020-07-14 PROBLEM — K85.90 PANCREATITIS, UNSPECIFIED PANCREATITIS TYPE: Status: ACTIVE | Noted: 2020-07-14

## 2020-07-14 LAB
ALBUMIN SERPL-MCNC: 3.8 G/DL (ref 3.5–5.2)
ALP BLD-CCNC: 82 U/L (ref 40–130)
ALT SERPL-CCNC: 18 U/L (ref 5–41)
ANION GAP SERPL CALCULATED.3IONS-SCNC: 11 MMOL/L (ref 7–19)
AST SERPL-CCNC: 18 U/L (ref 5–40)
BASOPHILS ABSOLUTE: 0 K/UL (ref 0–0.2)
BASOPHILS RELATIVE PERCENT: 0.4 % (ref 0–1)
BILIRUB SERPL-MCNC: 0.4 MG/DL (ref 0.2–1.2)
BUN BLDV-MCNC: 13 MG/DL (ref 8–23)
CALCIUM SERPL-MCNC: 8.6 MG/DL (ref 8.8–10.2)
CHLORIDE BLD-SCNC: 103 MMOL/L (ref 98–111)
CO2: 25 MMOL/L (ref 22–29)
CREAT SERPL-MCNC: 0.7 MG/DL (ref 0.5–1.2)
EKG P AXIS: 65 DEGREES
EKG P AXIS: 71 DEGREES
EKG P-R INTERVAL: 170 MS
EKG P-R INTERVAL: 182 MS
EKG Q-T INTERVAL: 466 MS
EKG Q-T INTERVAL: 490 MS
EKG QRS DURATION: 118 MS
EKG QRS DURATION: 120 MS
EKG QTC CALCULATION (BAZETT): 452 MS
EKG QTC CALCULATION (BAZETT): 471 MS
EKG T AXIS: 63 DEGREES
EKG T AXIS: 69 DEGREES
EOSINOPHILS ABSOLUTE: 0.1 K/UL (ref 0–0.6)
EOSINOPHILS RELATIVE PERCENT: 0.8 % (ref 0–5)
GFR NON-AFRICAN AMERICAN: >60
GLUCOSE BLD-MCNC: 111 MG/DL (ref 74–109)
HCT VFR BLD CALC: 37.7 % (ref 42–52)
HEMOGLOBIN: 12.4 G/DL (ref 14–18)
IMMATURE GRANULOCYTES #: 0.1 K/UL
LYMPHOCYTES ABSOLUTE: 2.2 K/UL (ref 1.1–4.5)
LYMPHOCYTES RELATIVE PERCENT: 22 % (ref 20–40)
MCH RBC QN AUTO: 30.8 PG (ref 27–31)
MCHC RBC AUTO-ENTMCNC: 32.9 G/DL (ref 33–37)
MCV RBC AUTO: 93.8 FL (ref 80–94)
MONOCYTES ABSOLUTE: 0.6 K/UL (ref 0–0.9)
MONOCYTES RELATIVE PERCENT: 5.9 % (ref 0–10)
NEUTROPHILS ABSOLUTE: 7 K/UL (ref 1.5–7.5)
NEUTROPHILS RELATIVE PERCENT: 70.4 % (ref 50–65)
PDW BLD-RTO: 12.5 % (ref 11.5–14.5)
PLATELET # BLD: 212 K/UL (ref 130–400)
PMV BLD AUTO: 10.7 FL (ref 9.4–12.4)
POTASSIUM SERPL-SCNC: 4.4 MMOL/L (ref 3.5–5)
RBC # BLD: 4.02 M/UL (ref 4.7–6.1)
SODIUM BLD-SCNC: 139 MMOL/L (ref 136–145)
TOTAL PROTEIN: 5.8 G/DL (ref 6.6–8.7)
TROPONIN: <0.01 NG/ML (ref 0–0.03)
WBC # BLD: 10 K/UL (ref 4.8–10.8)

## 2020-07-14 PROCEDURE — 84484 ASSAY OF TROPONIN QUANT: CPT

## 2020-07-14 PROCEDURE — 6360000002 HC RX W HCPCS: Performed by: EMERGENCY MEDICINE

## 2020-07-14 PROCEDURE — 36415 COLL VENOUS BLD VENIPUNCTURE: CPT

## 2020-07-14 PROCEDURE — 80053 COMPREHEN METABOLIC PANEL: CPT

## 2020-07-14 PROCEDURE — 85025 COMPLETE CBC W/AUTO DIFF WBC: CPT

## 2020-07-14 PROCEDURE — 2580000003 HC RX 258: Performed by: EMERGENCY MEDICINE

## 2020-07-14 PROCEDURE — 2580000003 HC RX 258: Performed by: INTERNAL MEDICINE

## 2020-07-14 PROCEDURE — 6370000000 HC RX 637 (ALT 250 FOR IP): Performed by: INTERNAL MEDICINE

## 2020-07-14 PROCEDURE — 93010 ELECTROCARDIOGRAM REPORT: CPT | Performed by: INTERNAL MEDICINE

## 2020-07-14 PROCEDURE — 6370000000 HC RX 637 (ALT 250 FOR IP): Performed by: PHYSICIAN ASSISTANT

## 2020-07-14 PROCEDURE — 1210000000 HC MED SURG R&B

## 2020-07-14 RX ORDER — CARVEDILOL 6.25 MG/1
6.25 TABLET ORAL 2 TIMES DAILY WITH MEALS
Status: DISCONTINUED | OUTPATIENT
Start: 2020-07-14 | End: 2020-07-15 | Stop reason: HOSPADM

## 2020-07-14 RX ORDER — 0.9 % SODIUM CHLORIDE 0.9 %
1000 INTRAVENOUS SOLUTION INTRAVENOUS ONCE
Status: COMPLETED | OUTPATIENT
Start: 2020-07-14 | End: 2020-07-14

## 2020-07-14 RX ORDER — SODIUM CHLORIDE 9 MG/ML
INJECTION, SOLUTION INTRAVENOUS CONTINUOUS
Status: DISCONTINUED | OUTPATIENT
Start: 2020-07-14 | End: 2020-07-15 | Stop reason: HOSPADM

## 2020-07-14 RX ORDER — MORPHINE SULFATE 4 MG/ML
4 INJECTION, SOLUTION INTRAMUSCULAR; INTRAVENOUS ONCE
Status: COMPLETED | OUTPATIENT
Start: 2020-07-14 | End: 2020-07-14

## 2020-07-14 RX ORDER — ATORVASTATIN CALCIUM 20 MG/1
20 TABLET, FILM COATED ORAL DAILY
Status: DISCONTINUED | OUTPATIENT
Start: 2020-07-14 | End: 2020-07-14

## 2020-07-14 RX ORDER — MORPHINE SULFATE 4 MG/ML
2 INJECTION, SOLUTION INTRAMUSCULAR; INTRAVENOUS EVERY 4 HOURS PRN
Status: DISCONTINUED | OUTPATIENT
Start: 2020-07-14 | End: 2020-07-15

## 2020-07-14 RX ORDER — AMLODIPINE BESYLATE 10 MG/1
10 TABLET ORAL DAILY
Status: DISCONTINUED | OUTPATIENT
Start: 2020-07-14 | End: 2020-07-14

## 2020-07-14 RX ORDER — ACETAMINOPHEN 325 MG/1
650 TABLET ORAL EVERY 4 HOURS PRN
Status: DISCONTINUED | OUTPATIENT
Start: 2020-07-14 | End: 2020-07-15 | Stop reason: HOSPADM

## 2020-07-14 RX ORDER — ASPIRIN 81 MG/1
81 TABLET ORAL DAILY
Status: DISCONTINUED | OUTPATIENT
Start: 2020-07-14 | End: 2020-07-15 | Stop reason: HOSPADM

## 2020-07-14 RX ORDER — ONDANSETRON 2 MG/ML
4 INJECTION INTRAMUSCULAR; INTRAVENOUS ONCE
Status: COMPLETED | OUTPATIENT
Start: 2020-07-14 | End: 2020-07-14

## 2020-07-14 RX ORDER — ATORVASTATIN CALCIUM 20 MG/1
20 TABLET, FILM COATED ORAL NIGHTLY
Status: DISCONTINUED | OUTPATIENT
Start: 2020-07-14 | End: 2020-07-15 | Stop reason: HOSPADM

## 2020-07-14 RX ORDER — 0.9 % SODIUM CHLORIDE 0.9 %
500 INTRAVENOUS SOLUTION INTRAVENOUS ONCE
Status: COMPLETED | OUTPATIENT
Start: 2020-07-14 | End: 2020-07-14

## 2020-07-14 RX ORDER — ISOSORBIDE MONONITRATE 60 MG/1
30 TABLET, EXTENDED RELEASE ORAL DAILY
Status: DISCONTINUED | OUTPATIENT
Start: 2020-07-14 | End: 2020-07-15 | Stop reason: HOSPADM

## 2020-07-14 RX ADMIN — MORPHINE SULFATE 4 MG: 4 INJECTION INTRAVENOUS at 00:56

## 2020-07-14 RX ADMIN — ISOSORBIDE MONONITRATE 30 MG: 60 TABLET, EXTENDED RELEASE ORAL at 11:26

## 2020-07-14 RX ADMIN — CARVEDILOL 3.12 MG: 6.25 TABLET, FILM COATED ORAL at 10:09

## 2020-07-14 RX ADMIN — SODIUM CHLORIDE: 9 INJECTION, SOLUTION INTRAVENOUS at 12:07

## 2020-07-14 RX ADMIN — ASPIRIN 81 MG: 81 TABLET, COATED ORAL at 10:10

## 2020-07-14 RX ADMIN — CARVEDILOL 3.12 MG: 6.25 TABLET, FILM COATED ORAL at 18:38

## 2020-07-14 RX ADMIN — ONDANSETRON 4 MG: 2 INJECTION INTRAMUSCULAR; INTRAVENOUS at 00:57

## 2020-07-14 RX ADMIN — SODIUM CHLORIDE 1000 ML: 9 INJECTION, SOLUTION INTRAVENOUS at 03:11

## 2020-07-14 RX ADMIN — ATORVASTATIN CALCIUM 20 MG: 20 TABLET, FILM COATED ORAL at 21:04

## 2020-07-14 RX ADMIN — SODIUM CHLORIDE 500 ML: 9 INJECTION, SOLUTION INTRAVENOUS at 00:56

## 2020-07-14 RX ADMIN — SODIUM CHLORIDE: 9 INJECTION, SOLUTION INTRAVENOUS at 06:02

## 2020-07-14 ASSESSMENT — PAIN SCALES - GENERAL: PAINLEVEL_OUTOF10: 2
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
## 2020-07-14 NOTE — H&P
[facility]  History and Physical    Patient:  Flaquito Cross  MRN: [id number]    CHIEF COMPLAINT: Abdominal pain with nausea and vomiting and associated chest pain      PCP: Carloz Samaniego MD    HISTORY OF PRESENT ILLNESS:   The patient is a 70 y.o. male who presented emergency room with epigastric abdominal pain and left-sided chest pain. Started he has had a deep ache in his mid abdomen. Is associated with nausea and vomiting. Then radiated to his left side and his left rib. He has a history of coronary artery disease. He said he never had any pain with his myocardial infarctions. He has a prior CABG. No history of blood clots. No calf pain or leg swelling. No recent travel. No fever. He does have a history of pancreatitis last episode was approximately 4 to 5 years ago with no further issues since his gallbladder was taken out. His above symptoms started yesterday after supper. He had an episode of nausea and vomiting.       Past Medical History:      Diagnosis Date    Additional heart attack (inferior wall)     AICD (automatic cardioverter/defibrillator) present 9/6/13  MDL    CAD (coronary artery disease)     SEVERE MULTIVESSEL    Hx of blood clots 1973    Post op hip surgery    Hyperlipidemia     PCP manages     Ischemic cardiomyopathy     SEVERE    Pancreatitis     Prostate CA [facility])        Past Surgical History:      Procedure Laterality Date    APPENDECTOMY      CARDIAC CATHETERIZATION  6/14/11    DR Maddi Tilley    CARDIAC CATHETERIZATION  6/20/2013  MDL    EF 20%    CARDIAC DEFIBRILLATOR PLACEMENT  9/6/13    CHOLECYSTECTOMY  04/2016    CORONARY ARTERY BYPASS GRAFT  6/16/11    PACABG X 4, LIMA-DIAG, SVG-PLM, SVG-LAD, SVG-OM, RT LEG EVH, DR Lopez Corporal    HIP SURGERY      Growth in hip removed    KNEE ARTHROSCOPY      PROSTATE SURGERY      PROSTATE SURGERY  03/18/2020       Medications Prior to Admission:    Prior to Admission medications    Medication Sig Start Date End Date Taking? Authorizing Provider   simvastatin (ZOCOR) 10 MG tablet TAKE 1 TABLET EVERY NIGHT. Labs are under media. 6/23/20  Yes JONATHAN Rivas   isosorbide mononitrate (IMDUR) 30 MG extended release tablet TAKE 1 TABLET EVERY DAY 5/12/20  Yes Lorretta Lefort, APRN   carvedilol (COREG) 6.25 MG tablet Take 1/2 tablet twice daily   Yes Historical Provider, MD   aspirin 81 MG EC tablet Take 81 mg by mouth daily. Yes Historical Provider, MD   nitroGLYCERIN (NITROSTAT) 0.4 MG SL tablet Place 1 tablet under the tongue every 5 minutes as needed for Chest pain 3/14/19   JONATHAN Holley       Allergies:  Patient has no known allergies.     Social History:   Social History     Socioeconomic History    Marital status:      Spouse name: Not on file    Number of children: Not on file    Years of education: Not on file    Highest education level: Not on file   Occupational History    Not on file   Social Needs    Financial resource strain: Not on file    Food insecurity     Worry: Not on file     Inability: Not on file    Transportation needs     Medical: Not on file     Non-medical: Not on file   Tobacco Use    Smoking status: Former Smoker     Types: Cigarettes    Smokeless tobacco: Never Used   Substance and Sexual Activity    Alcohol use: No    Drug use: No    Sexual activity: Not on file   Lifestyle    Physical activity     Days per week: Not on file     Minutes per session: Not on file    Stress: Not on file   Relationships    Social connections     Talks on phone: Not on file     Gets together: Not on file     Attends Latter day service: Not on file     Active member of club or organization: Not on file     Attends meetings of clubs or organizations: Not on file     Relationship status: Not on file    Intimate partner violence     Fear of current or ex partner: Not on file     Emotionally abused: Not on file     Physically abused: Not on file     Forced sexual activity: Not on file Other Topics Concern    Not on file   Social History Narrative    Not on file       Family History:       Problem Relation Age of Onset    Diabetes Mother     Cancer Sister         Pancreatic      Review of systems:    Con: no fever/chills or significant weight changes   Heent: denies HA, change in vision or hearing. No significant sinus drainage. No             difficulties swallowing. No recent trauma noted. CV: denies CP, dyspnea on exertion, Palpitations. No change in exercise tolerance  Pulm: No cough, sputum production, denies hemoptysis   GI: denies changes in bowel habits, no diarrhea or significant constipation. No BRBPR       or melena. : no dysuria, hesitancy or dysuria. Denies hematuria. Derm: no rashes or new lesions of concern. Psych: no signs of depression, anxiety or significant mood changes. Neuro: denies focal weakness or change in mentation  A full 14 point review of systems is otherwise negative outside listed above and HPI      Physical Exam:    Vitals: /62   Pulse 60   Temp 98.4 °F (36.9 °C) (Temporal)   Resp 18   Ht 5' 10\" (1.778 m)   Wt 168 lb (76.2 kg)   SpO2 99%   BMI 24.11 kg/m²     GENERAL: WD/WN not in acute distress, resting well in bed  HEENT: NC/AT PERRL, EOMI grossly, TM's clear, OP negative without sig erythema or exudate, neck is supple without masses or carotid bruit noted    CV: normal S1 and S2, no sig murmur, lift or gallop, normal PMI  CHEST: clear to auscultation both anterior and posterior, no rales rhonchi or wheeze appreciated. ABD: Tenderness: There is abdominal tenderness in the epigastric area.    /rectal: deferred  EXT: no cyanosis or clubbing noted, normal ROM  DERM: skin is warm and dry without sig rashes on exposed areas  PSYCH: appears mentally stable with no obvious abnormalities  NEURO: CN 2-12 apper grossly intact without sig deficits in motor or sensory       CBC:   Recent Labs     07/13/20 2004 07/14/20  0519   WBC 11.9* 10.0 axial plane with subsequent reconstruction in coronal and sagittal plane. There is no previous study for comparison. The correlation is made with chest radiograph dated 7/13/2020. There is good opacification of the pulmonary arteries and the branches. No filling defects in the opacified pulmonary arterial bed. Atheromatous changes thoracic aorta are seen. No aneurysmal dilatation. Severe atheromatous changes of coronary arteries are seen. There is evidence of a prior CABG. The RV/LV ratio is 46/46 which is in the normal range. No finding to suggest right ventricle strain. There is no evidence of mediastinal or hilar mass or lymphadenopathy. A small sliding-type hiatal hernia seen. The thyroid gland is suboptimally visualized and evaluated. The lungs are well-expanded. There are some atelectatic changes and ill-defined nodularity in the right lower lobe posteriorly which is suboptimally evaluated due to respiratory motion artifacts. An ill-defined subpleural nodule in the right lower lobe, image #134 in axial plane, measures 8 mm in greatest dimension. The abdomen is separate evaluated with CT scan of the abdomen and pelvis which is to follow. A cardiac pacer is seen projected over the left upper chest with extensive streak artifacts. No evidence of pulmonary embolism. Scarring and discoid atelectasis in the right lower lung. A small ill-defined nodule in the right foot which may represent a small focal infiltrate or atelectasis. This may be further evaluated by a follow-up examination. A small hiatal hernia. The above study was initially reviewed and reported by stat rads. I do not find any discrepancies.  Signed by Dr Maddi Gonzalez on 7/14/2020 6:58 AM      Assessment and Plan     Active Problems:    Pancreatitis, unspecified pancreatitis type  Chest pain  Nausea and vomiting  Coronary artery disease status post CABG 2011  Essential hypertension--continue with Coreg, Imdur  Mixed dyslipidemia--continue Zocor 10 mg daily  Anemia--stable    CT scan positive for acute pancreatitis, lipase elevated 188, WBC elevated 12K  Follow serology and clinical course    Treatment plan:  Extended conference with patient work-up reviewed CT positive for acute pancreatitis. Clinically improving. Will slowly advance to clear liquid diet  Cardiac work-up thus far negative. Chest pain suspected secondary to acute pancreatitis    Nya Aaron  . 7/14/2020     Patient looks better today. We will start clears. Follow amylase lipase. IV pain medication ordered. Hopefully discharge home 1-2 days. I have discussed the care of Haven Hernandez, including pertinent history and exam findings with the ARNP/PA. I have seen and examined the patient and the key elements of all parts of the encounter have been performed by me. I agree with the assessment and plan as outlined by the ARNP/PA. Please refer to my separate note for complete documentation.      Electronically signed by Yogi Coy MD on 7/14/2020 at 8:43 AM

## 2020-07-14 NOTE — ED PROVIDER NOTES
[facility]  eMERGENCY dEPARTMENT eNCOUnter      Pt Name: Cristino Kraus  MRN: [id number]  Rayraygfjuan manuel 1945  Date of evaluation: 7/13/2020  Provider: Felice Faulkner MD    [address] [place] [address]       Chief Complaint   Patient presents with    Abdominal Pain     all day; n/v    Chest Pain     left rib-cage x 1 hour pta         HISTORY OF PRESENT ILLNESS   (Location/Symptom, Timing/Onset,Context/Setting, Quality, Duration, Modifying Factors, Severity)  Note limiting factors. Cristino Kraus is a 70 y.o. male who presents to the emergency department with epigastric abdominal pain and left-sided chest pain. Started as a deep ache in his mid abdomen. Is associated with nausea and vomiting. Then radiated to his left side and his left rib. He has a history of coronary artery disease. He said he never had any pain with his MIs. He has a prior CABG. No history of blood clots. No calf pain or leg swelling. No recent travel. No fever. HPI    NursingNotes were reviewed. REVIEW OF SYSTEMS    (2-9 systems for level 4, 10 or more for level 5)     Review of Systems   Constitutional: Negative for chills and fever. HENT: Negative for rhinorrhea and sore throat. Respiratory: Negative for shortness of breath. Cardiovascular: Positive for chest pain. Negative for leg swelling. Gastrointestinal: Positive for abdominal pain, nausea and vomiting. Negative for diarrhea. Genitourinary: Negative for difficulty urinating. Musculoskeletal: Negative for back pain and neck pain. Skin: Negative for rash. Neurological: Negative for weakness and headaches. Psychiatric/Behavioral: Negative for confusion. A complete review of systems was performed and is negative except as noted above in the HPI.        PAST MEDICAL HISTORY     Past Medical History:   Diagnosis Date    Additional heart attack (inferior wall)     AICD (automatic cardioverter/defibrillator) present 9/6/13  MDL    CAD (coronary artery disease)     SEVERE MULTIVESSEL    Hx of blood clots 1973    Post op hip surgery    Hyperlipidemia     PCP manages     Ischemic cardiomyopathy     SEVERE    Pancreatitis          SURGICAL HISTORY       Past Surgical History:   Procedure Laterality Date    APPENDECTOMY      CARDIAC CATHETERIZATION  6/14/11    DR Maddi Tilley    CARDIAC CATHETERIZATION  6/20/2013  MDL    EF 20%    CARDIAC DEFIBRILLATOR PLACEMENT  9/6/13    CHOLECYSTECTOMY  04/2016    CORONARY ARTERY BYPASS GRAFT  6/16/11    PACABG X 4, LIMA-DIAG, SVG-PLM, SVG-LAD, SVG-OM, RT LEG EVH, DR Lopez Corporal    HIP SURGERY      Growth in hip removed    KNEE ARTHROSCOPY      PROSTATE SURGERY           CURRENT MEDICATIONS       Previous Medications    ASPIRIN 81 MG EC TABLET    Take 81 mg by mouth daily. CARVEDILOL (COREG) 6.25 MG TABLET    Take 1/2 tablet twice daily    ISOSORBIDE MONONITRATE (IMDUR) 30 MG EXTENDED RELEASE TABLET    TAKE 1 TABLET EVERY DAY    NITROGLYCERIN (NITROSTAT) 0.4 MG SL TABLET    Place 1 tablet under the tongue every 5 minutes as needed for Chest pain    SIMVASTATIN (ZOCOR) 10 MG TABLET    TAKE 1 TABLET EVERY NIGHT. Labs are under media. ALLERGIES     Patient has no known allergies.     FAMILY HISTORY       Family History   Problem Relation Age of Onset    Diabetes Mother     Cancer Sister         Pancreatic          SOCIAL HISTORY       Social History     Socioeconomic History    Marital status:      Spouse name: None    Number of children: None    Years of education: None    Highest education level: None   Occupational History    None   Social Needs    Financial resource strain: None    Food insecurity     Worry: None     Inability: None    Transportation needs     Medical: None     Non-medical: None   Tobacco Use    Smoking status: Former Smoker     Types: Cigarettes    Smokeless tobacco: Never Used   Substance and Sexual Activity    Alcohol use: No    Drug use: No    Sexual activity: None Lifestyle    Physical activity     Days per week: None     Minutes per session: None    Stress: None   Relationships    Social connections     Talks on phone: None     Gets together: None     Attends Christian service: None     Active member of club or organization: None     Attends meetings of clubs or organizations: None     Relationship status: None    Intimate partner violence     Fear of current or ex partner: None     Emotionally abused: None     Physically abused: None     Forced sexual activity: None   Other Topics Concern    None   Social History Narrative    None       SCREENINGS             PHYSICAL EXAM    (up to 7 for level 4, 8 or more for level 5)     ED Triage Vitals   BP Temp Temp src Pulse Resp SpO2 Height Weight   07/13/20 1956 07/13/20 1954 -- 07/13/20 1954 07/13/20 1954 07/13/20 1954 07/13/20 1954 07/13/20 1954   (!) 140/74 96.6 °F (35.9 °C)  56 17 97 % 5' 10\" (1.778 m) 168 lb (76.2 kg)       Physical Exam  Constitutional:       General: He is not in acute distress. Appearance: He is well-developed. He is not diaphoretic. HENT:      Head: Normocephalic and atraumatic. Eyes:      Pupils: Pupils are equal, round, and reactive to light. Neck:      Musculoskeletal: Normal range of motion and neck supple. Cardiovascular:      Rate and Rhythm: Normal rate and regular rhythm. Heart sounds: Normal heart sounds. Pulmonary:      Effort: Pulmonary effort is normal. No respiratory distress. Breath sounds: Normal breath sounds. Abdominal:      General: Bowel sounds are normal. There is no distension. Palpations: Abdomen is soft. Tenderness: There is abdominal tenderness. Comments: Epigastric   Musculoskeletal: Normal range of motion. Skin:     General: Skin is warm and dry. Findings: No rash. Neurological:      Mental Status: He is alert and oriented to person, place, and time. Cranial Nerves: No cranial nerve deficit.       Motor: No abnormal muscle tone. Coordination: Coordination normal.   Psychiatric:         Behavior: Behavior normal.         DIAGNOSTIC RESULTS     EKG: All EKG's are interpreted by the Emergency Department Physician who either signs or Co-signs this chart in the absence of a cardiologist.    Normal sinus rhythm rate 52 no acute ST wave abnormality inferior Q waves  Normal sinus rhythm rate 50 nonspecific ST waves inferior Q waves unchanged    RADIOLOGY:   Non-plain film images such as CT, Ultrasound and MRI are read by the radiologist. Redgie Guard images are visualized and preliminarily interpreted by the emergency physician with the below findings:        Interpretation per the Radiologist below, if available at the time of this note:    XR CHEST PORTABLE   Final Result   Portable chest x-ray demonstrates no active disease.    Signed by Dr Jackie Chaparro on 7/13/2020 8:27 PM      CTA PULMONARY W CONTRAST    (Results Pending)   CT ABDOMEN PELVIS W IV CONTRAST Additional Contrast? None    (Results Pending)         ED BEDSIDE ULTRASOUND:   Performed by ED Physician - none    LABS:  Labs Reviewed   CBC WITH AUTO DIFFERENTIAL - Abnormal; Notable for the following components:       Result Value    WBC 11.9 (*)     RBC 4.20 (*)     Hemoglobin 13.3 (*)     Hematocrit 39.3 (*)     MCH 31.7 (*)     All other components within normal limits   COMPREHENSIVE METABOLIC PANEL - Abnormal; Notable for the following components:    Glucose 124 (*)     All other components within normal limits   LIPASE - Abnormal; Notable for the following components:    Lipase 188 (*)     All other components within normal limits   URINE RT REFLEX TO CULTURE - Abnormal; Notable for the following components:    Blood, Urine TRACE (*)     All other components within normal limits   MICROSCOPIC URINALYSIS - Abnormal; Notable for the following components:    Bacteria, UA NEGATIVE (*)     All other components within normal limits   D-DIMER, QUANTITATIVE - Abnormal; Notable for the following components:    D-Dimer, Quant 0.92 (*)     All other components within normal limits   TROPONIN   TROPONIN       All other labs were within normal range or not returned as of this dictation. EMERGENCY DEPARTMENT COURSE and DIFFERENTIALDIAGNOSIS/MDM:   Vitals:    Vitals:    07/13/20 1954 07/13/20 1956 07/13/20 2100   BP:  (!) 140/74 133/83   Pulse: 56  57   Resp: 17  17   Temp: 96.6 °F (35.9 °C)     SpO2: 97%  95%   Weight: 168 lb (76.2 kg)     Height: 5' 10\" (1.778 m)         MDM  CECILE Dr Bryan Bedolla end of shift pending CT    CONSULTS:  None    PROCEDURES:  Unless otherwise notedbelow, none     Procedures    FINAL IMPRESSION     1. Abdominal pain, epigastric    2.  Chest pain, unspecified type          DISPOSITION/PLAN   DISPOSITION        PATIENT REFERRED TO:  @FUP@    DISCHARGE MEDICATIONS:  New Prescriptions    No medications on file          (Please note that portions of this note were completed with a voice recognition program.  Efforts were made to edit the dictations butoccasionally words are mis-transcribed.)    Anshu Felton MD (electronically signed)  AttendingEmergency Physician         Anshu Felton MD  07/13/20 7239

## 2020-07-14 NOTE — ED NOTES
Patient placed on cardiac monitor, continuous pulse oximeter, and NIBP monitor. Monitor alarms on.          Jose Roberto Ross RN  07/13/20 2008

## 2020-07-15 VITALS
WEIGHT: 168 LBS | HEIGHT: 70 IN | OXYGEN SATURATION: 95 % | SYSTOLIC BLOOD PRESSURE: 125 MMHG | DIASTOLIC BLOOD PRESSURE: 73 MMHG | HEART RATE: 68 BPM | RESPIRATION RATE: 20 BRPM | BODY MASS INDEX: 24.05 KG/M2 | TEMPERATURE: 97.2 F

## 2020-07-15 LAB
ALBUMIN SERPL-MCNC: 3.5 G/DL (ref 3.5–5.2)
ALP BLD-CCNC: 72 U/L (ref 40–130)
ALT SERPL-CCNC: 16 U/L (ref 5–41)
ANION GAP SERPL CALCULATED.3IONS-SCNC: 12 MMOL/L (ref 7–19)
AST SERPL-CCNC: 17 U/L (ref 5–40)
BASOPHILS ABSOLUTE: 0 K/UL (ref 0–0.2)
BASOPHILS RELATIVE PERCENT: 0.4 % (ref 0–1)
BILIRUB SERPL-MCNC: 0.5 MG/DL (ref 0.2–1.2)
BUN BLDV-MCNC: 8 MG/DL (ref 8–23)
CALCIUM SERPL-MCNC: 8.4 MG/DL (ref 8.8–10.2)
CHLORIDE BLD-SCNC: 104 MMOL/L (ref 98–111)
CO2: 22 MMOL/L (ref 22–29)
CREAT SERPL-MCNC: 0.6 MG/DL (ref 0.5–1.2)
EOSINOPHILS ABSOLUTE: 0.2 K/UL (ref 0–0.6)
EOSINOPHILS RELATIVE PERCENT: 2.6 % (ref 0–5)
GFR AFRICAN AMERICAN: >59
GFR NON-AFRICAN AMERICAN: >60
GLUCOSE BLD-MCNC: 99 MG/DL (ref 74–109)
HCT VFR BLD CALC: 35.2 % (ref 42–52)
HEMOGLOBIN: 11.6 G/DL (ref 14–18)
IMMATURE GRANULOCYTES #: 0 K/UL
LYMPHOCYTES ABSOLUTE: 2.4 K/UL (ref 1.1–4.5)
LYMPHOCYTES RELATIVE PERCENT: 29 % (ref 20–40)
MCH RBC QN AUTO: 31.3 PG (ref 27–31)
MCHC RBC AUTO-ENTMCNC: 33 G/DL (ref 33–37)
MCV RBC AUTO: 94.9 FL (ref 80–94)
MONOCYTES ABSOLUTE: 0.6 K/UL (ref 0–0.9)
MONOCYTES RELATIVE PERCENT: 7.3 % (ref 0–10)
NEUTROPHILS ABSOLUTE: 4.9 K/UL (ref 1.5–7.5)
NEUTROPHILS RELATIVE PERCENT: 60.3 % (ref 50–65)
PDW BLD-RTO: 12.5 % (ref 11.5–14.5)
PLATELET # BLD: 195 K/UL (ref 130–400)
PMV BLD AUTO: 10.7 FL (ref 9.4–12.4)
POTASSIUM SERPL-SCNC: 3.8 MMOL/L (ref 3.5–5)
RBC # BLD: 3.71 M/UL (ref 4.7–6.1)
SODIUM BLD-SCNC: 138 MMOL/L (ref 136–145)
TOTAL PROTEIN: 5.5 G/DL (ref 6.6–8.7)
WBC # BLD: 8.1 K/UL (ref 4.8–10.8)

## 2020-07-15 PROCEDURE — 6370000000 HC RX 637 (ALT 250 FOR IP): Performed by: PHYSICIAN ASSISTANT

## 2020-07-15 PROCEDURE — 80053 COMPREHEN METABOLIC PANEL: CPT

## 2020-07-15 PROCEDURE — 85025 COMPLETE CBC W/AUTO DIFF WBC: CPT

## 2020-07-15 PROCEDURE — 36415 COLL VENOUS BLD VENIPUNCTURE: CPT

## 2020-07-15 PROCEDURE — 6370000000 HC RX 637 (ALT 250 FOR IP): Performed by: INTERNAL MEDICINE

## 2020-07-15 RX ORDER — ATORVASTATIN CALCIUM 20 MG/1
20 TABLET, FILM COATED ORAL NIGHTLY
Qty: 30 TABLET | Refills: 3 | Status: SHIPPED | OUTPATIENT
Start: 2020-07-15 | End: 2020-11-04 | Stop reason: SDUPTHER

## 2020-07-15 RX ORDER — ONDANSETRON 4 MG/1
4 TABLET, FILM COATED ORAL 3 TIMES DAILY PRN
Qty: 15 TABLET | Refills: 0 | Status: SHIPPED | OUTPATIENT
Start: 2020-07-15

## 2020-07-15 RX ADMIN — CARVEDILOL 6.25 MG: 6.25 TABLET, FILM COATED ORAL at 09:38

## 2020-07-15 RX ADMIN — ASPIRIN 81 MG: 81 TABLET, COATED ORAL at 09:38

## 2020-07-15 RX ADMIN — ISOSORBIDE MONONITRATE 30 MG: 60 TABLET, EXTENDED RELEASE ORAL at 09:38

## 2020-07-15 NOTE — DISCHARGE SUMMARY
Hospital Discharge Summary    Lizette Aquino  :  1945  MRN:  [id number]    Admit date:  2020  Discharge date:  July 15, 2020    Admitting Physician:    Rosalie Schmidt MD    Discharge Diagnoses:    Principal Problem:    Pancreatitis, unspecified pancreatitis type  Active Problems:    Hx of CABG    CAD (coronary artery disease)    Hyperlipidemia    HTN (hypertension)    Ischemic cardiomyopathy    Nausea & vomiting      Hospital Course: The patient was admitted for the above noted medical/surgical issues. The patient is a 70 y.o. male who presented emergency room with epigastric abdominal pain and left-sided chest pain.  Started he has had a deep ache in his mid abdomen.  Is associated with nausea and vomiting.  Then radiated to his left side and his left rib.  He has a history of coronary artery disease.  He said he never had any pain with his myocardial infarctions.  He has a prior CABG.  No history of blood clots.  No calf pain or leg swelling.  No recent travel.  No fever. He does have a history of pancreatitis last episode was approximately 4 to 5 years ago with no further issues since his gallbladder was taken out. His above symptoms started yesterday after supper. He had an episode of nausea and vomiting. Please see daily progress note for further details concerning their stay. The patient improved throughout their stay and reached maximum medical improvement on the day of discharge. The patient/family agree with the treatment plan as outlined above. All questions concerning their stay were answered prior to discharge. They understand the importance of follow up concerning any abnormal test results. Discharge Medications:       Abel Zapien   Home Medication Instructions AIA:[id number]    Printed on:07/15/20 61   Medication Information                      aspirin 81 MG EC tablet  Take 81 mg by mouth daily.                atorvastatin (LIPITOR) 20 MG tablet  Take 1 tablet by mouth nightly             carvedilol (COREG) 6.25 MG tablet  Take 1/2 tablet twice daily             isosorbide mononitrate (IMDUR) 30 MG extended release tablet  TAKE 1 TABLET EVERY DAY             nitroGLYCERIN (NITROSTAT) 0.4 MG SL tablet  Place 1 tablet under the tongue every 5 minutes as needed for Chest pain             ondansetron (ZOFRAN) 4 MG tablet  Take 1 tablet by mouth 3 times daily as needed for Nausea or Vomiting             Addendum- patient not requiring pain medication. States he has leftover Norco available at home if needed. New prescription for Zofran and increase dose of Lipitor otherwise no new changes. Consults: None    Significant Diagnostic Studies:    See summary of labs/x-rays/path report for further details      Treatments:     Aggressive IV fluid hydration, n.p.o. status with slowly advancing to clear liquid and low-fat diet  Antiemetic therapy and IV pain medication  Serial labs    Disposition:   Stable  Follow up with Patrice tucker physician assistant 1 week    Signed:  Jasiel Alexi   7/15/2020, 7:17 AM     Looks much better today. Patient well educated on pancreatic diet. Not requiring pain medication. Medically stable for discharge home today. Follow-up in office 1 week for hospital follow-up. I have discussed the care of Brian Hendricks, including pertinent history and exam findings with the ARNP/PA. I have seen and examined the patient and the key elements of all parts of the encounter have been performed by me. I agree with the assessment and plan as outlined by the ARNP/PA. Please refer to my separate note for complete documentation.      Electronically signed by Peace Sutton MD on 7/15/2020 at 9:51 AM

## 2020-08-10 RX ORDER — CARVEDILOL 6.25 MG/1
3.12 TABLET ORAL 2 TIMES DAILY
Qty: 90 TABLET | Refills: 1 | Status: SHIPPED | OUTPATIENT
Start: 2020-08-10 | End: 2021-02-02

## 2020-09-15 ENCOUNTER — RESULTS ENCOUNTER (OUTPATIENT)
Dept: UROLOGY | Facility: CLINIC | Age: 75
End: 2020-09-15

## 2020-09-15 DIAGNOSIS — C61 PROSTATE CANCER (HCC): ICD-10-CM

## 2020-09-28 ENCOUNTER — TELEPHONE (OUTPATIENT)
Dept: CARDIOLOGY | Age: 75
End: 2020-09-28

## 2020-10-28 ENCOUNTER — TELEPHONE (OUTPATIENT)
Dept: CARDIOLOGY | Age: 75
End: 2020-10-28

## 2020-10-28 NOTE — TELEPHONE ENCOUNTER
10/28 caled needing the at home pacemaker reading sent in today or tomorrow. The pt forgot to call and get a new at home machine, he is calling now to request it and will send in a reading as soon as he gets it .

## 2020-11-04 ENCOUNTER — TELEPHONE (OUTPATIENT)
Dept: CARDIOLOGY | Age: 75
End: 2020-11-04

## 2020-11-04 RX ORDER — ATORVASTATIN CALCIUM 20 MG/1
20 TABLET, FILM COATED ORAL NIGHTLY
Qty: 30 TABLET | Refills: 5 | Status: SHIPPED | OUTPATIENT
Start: 2020-11-04 | End: 2020-11-10 | Stop reason: SDUPTHER

## 2020-11-04 NOTE — TELEPHONE ENCOUNTER
Tried to reach patient to let him know he needs labs before any further refills. There was a lot of static and unable to hear patient will try back later.

## 2020-11-04 NOTE — TELEPHONE ENCOUNTER
Patient came into the office requesting a refill for his simvastatin (Zocor) 20mg. He would like it sent to [facility] mail order pharmacy.  Any questions or concerns you can reach the patient at [phone] non-reactive

## 2020-11-10 RX ORDER — ATORVASTATIN CALCIUM 20 MG/1
20 TABLET, FILM COATED ORAL NIGHTLY
Qty: 90 TABLET | Refills: 3 | Status: SHIPPED | OUTPATIENT
Start: 2020-11-10 | End: 2020-11-11

## 2020-11-12 RX ORDER — SIMVASTATIN 10 MG
TABLET ORAL
Qty: 90 TABLET | Refills: 3 | Status: SHIPPED | OUTPATIENT
Start: 2020-11-12 | End: 2022-02-08

## 2020-11-12 RX ORDER — SIMVASTATIN 10 MG
TABLET ORAL
Qty: 90 TABLET | Refills: 1 | Status: SHIPPED | OUTPATIENT
Start: 2020-11-12 | End: 2020-11-12 | Stop reason: SDUPTHER

## 2020-12-11 ENCOUNTER — LAB (OUTPATIENT)
Dept: UROLOGY | Facility: CLINIC | Age: 75
End: 2020-12-11

## 2020-12-11 DIAGNOSIS — C61 PROSTATE CANCER (HCC): ICD-10-CM

## 2020-12-11 NOTE — PROGRESS NOTES
Subjective    Mr. Molina is 75 y.o. male    Chief Complaint: Prostate Cancer    History of Present Illness  ***  History of Present Illness  Pt. presents with history of prostate cancer diagnosed in October 2019. Current disease state isintermediate risk disease Pt. underwent robot assisted laparoscpic prostatectomy  Path showing T2a 7 adenocarcinoma of prostate with Negative surgical margins.   Pt.  having 0 ppd incontinence.  Associated voiding symptoms include No evidence of frequency, urgency .   Pt. Is having erectile dysfunction thatdoes not   to PDE5 inhibitors.  There has been no bone pain, weight loss, abdominal pain, back pain, or gross hematuria.     The following portions of the patient's history were reviewed and updated as appropriate: allergies, current medications, past family history, past medical history, past social history, past surgical history and problem list.    Review of Systems      Current Outpatient Medications:   •  aspirin 81 MG EC tablet, Take 81 mg by mouth Daily. WILL STOP 11/2/18, Disp: , Rfl:   •  baclofen (LIORESAL) 10 MG tablet, Take 1 tablet by mouth 3 (Three) Times a Day As Needed for Muscle Spasms. As needed for hiccups, Disp: 20 tablet, Rfl: 0  •  carvedilol (COREG) 3.125 MG tablet, Take 3.125 mg by mouth 2 (Two) Times a Day With Meals., Disp: , Rfl:   •  chlorproMAZINE (THORAZINE) 25 MG tablet, Take 1 tablet by mouth 3 (Three) Times a Day., Disp: 30 tablet, Rfl: 1  •  isosorbide mononitrate (IMDUR) 30 MG 24 hr tablet, 30 mg Daily., Disp: , Rfl:   •  nitroglycerin (NITROSTAT) 0.4 MG SL tablet, Place 0.4 mg under the tongue Every 5 (Five) Minutes As Needed for Chest Pain., Disp: , Rfl:   •  omeprazole (priLOSEC) 40 MG capsule, Take 1 capsule by mouth Daily., Disp: 30 capsule, Rfl: 0  •  simvastatin (ZOCOR) 10 MG tablet, Take 10 mg by mouth Every Night., Disp: , Rfl:     Past Medical History:   Diagnosis Date   • Arthritis    • CHF (congestive heart failure) (CMS/[facility])       [place]    • Childhood asthma    • Colon polyp    • Coronary artery disease    • Dysuria    • Elevated PSA    • GERD (gastroesophageal reflux disease)    • History of pulmonary embolism     yrs ago    • Hyperlipidemia    • Myocardial infarction (CMS/HCC)    • Prostate cancer (CMS/HCC)    • Seborrheic keratosis 2019    Left Gnosticist    • Shingles        Past Surgical History:   Procedure Laterality Date   • APPENDECTOMY     • CARDIAC DEFIBRILLATOR PLACEMENT     • CHOLECYSTECTOMY     • COLONOSCOPY     • COLONOSCOPY N/A 10/20/2017    Procedure: COLONOSCOPY WITH ANESTHESIA;  Surgeon: Donny Mock MD;  Location: [facility] ENDOSCOPY;  Service:    • CORONARY ARTERY BYPASS [facility]RA[facility]  2011    [facility]   • ENDOSCOPY  2016   • ENDOSCOPY N/A 10/20/2017    Procedure: ESOPHAGOGASTRODUODENOSCOPY WITH ANESTHESIA;  Surgeon: Donny Mock MD;  Location: [facility] ENDOSCOPY;  Service:    • EXCISION LESION Left 2019    Left Gnosticist  Path- Inflamed pigmented seborrheic keratosis   • HIP SURGERY Left    • KNEE ARTHROSCOPY Right 2018    Procedure: KNEE ARTHROSCOPY WITH MENISCECTOMY;  Surgeon: Hernesto Rodriguez MD;  Location: [facility] OR;  Service: Orthopedics   • PACEMAKER IMPLANTATION      defibrillator also [employer]   • PROSTATECTOMY Bilateral 10/22/2019    Procedure: RADICAL PROSTATECTOMY LAPAROSCOPIC WITH DAVINCI SI ROBOT;  Surgeon: Haim Warren MD;  [facility];  [facility]       Social History     Socioeconomic History   • Marital status:      Spouse name: Not on file   • Number of children: Not on file   • Years of education: Not on file   • Highest education level: Not on file   Tobacco Use   • Smoking status: Former Smoker     Quit date: 10/20/1978     Years since quittin.1   • Smokeless tobacco: Never Used   Substance and Sexual Activity   • Alcohol use: No   • Drug use: No   • Sexual activity: Defer       Family History   Problem Relation Age of  Onset   • Colon cancer Father        Objective    There were no vitals taken for this visit.    Physical Exam        Results for orders placed or performed in visit on 06/17/20   POC Urinalysis Dipstick, Multipro    Specimen: Urine   Result Value Ref Range    Color Yellow Yellow, Straw, Dark Yellow, Sinai    Clarity, UA Clear Clear    Glucose, UA Negative Negative, 1000 mg/dL (3+) mg/dL    Bilirubin Negative Negative    Ketones, UA Negative Negative    Specific Gravity  1.010 1.005 - 1.030    Blood, UA Trace (A) Negative    pH, Urine 6.5 5.0 - 8.0    Protein, POC Negative Negative mg/dL    Urobilinogen, UA Normal Normal    Nitrite, UA Negative Negative    Leukocytes Negative Negative     Assessment and Plan       Diagnoses and all orders for this visit:     Prostate cancer (CMS/[facility])  -     POC Urinalysis Dipstick, Multipro  -     PSA DIAGNOSTIC; Future  -     PSA DIAGNOSTIC; Future     Impotence of organic origin     Patient with favorable risk intermediate prostate cancer.  PSA was 7.2.  He underwent truss biopsy on 9/11/2019 was found to have [place] 3+4 = 7 adenocarcinoma the prostate in 1 out of 12 cores at the right base.      He underwent robotic prostatectomy on October 22, 2019 which revealed [place] 3+4 = 7 adenocarcinoma prostate with negative surgical margins.  Postoperative PSA is undetectable.     ROSENDO has resolved.        He is on isosorbide mononitrate which precludes the use of Viagra or Cialis.  Not interested in injection therapy at this point.     Follow-up in 6 months with repeat PSA.

## 2020-12-14 LAB — PSA SERPL-MCNC: <0.014 NG/ML (ref 0–4)

## 2020-12-16 NOTE — PROGRESS NOTES
Subjective    Mr. Molina is 75 y.o. male    Chief Complaint: Prostate Cancer    History of Present Illness  Pt. presents with history of prostate cancer diagnosed in October 2019. Current disease state isintermediate risk disease Pt. underwent robot assisted laparoscpic prostatectomy  Path showing T2a 7 adenocarcinoma of prostate with Negative surgical margins.   Pt.  having 0 ppd incontinence.  Associated voiding symptoms include No evidence of frequency, urgency .   Pt. Is having erectile dysfunction thatdoes not   to PDE5 inhibitors.  There has been no bone pain, weight loss, abdominal pain, back pain, or gross hematuria.   The following portions of the patient's history were reviewed and updated as appropriate: allergies, current medications, past family history, past medical history, past social history, past surgical history and problem list.    Review of Systems   Constitutional: Negative for chills and fever.   Gastrointestinal: Negative for abdominal pain, anal bleeding and blood in stool.   Genitourinary: Negative for dysuria, flank pain, frequency, hematuria and urgency.         Current Outpatient Medications:   •  aspirin 81 MG EC tablet, Take 81 mg by mouth Daily. WILL STOP 11/2/18, Disp: , Rfl:   •  atorvastatin (LIPITOR) 20 MG tablet, , Disp: , Rfl:   •  baclofen (LIORESAL) 10 MG tablet, Take 1 tablet by mouth 3 (Three) Times a Day As Needed for Muscle Spasms. As needed for hiccups, Disp: 20 tablet, Rfl: 0  •  carvedilol (COREG) 3.125 MG tablet, Take 3.125 mg by mouth 2 (Two) Times a Day With Meals., Disp: , Rfl:   •  chlorproMAZINE (THORAZINE) 25 MG tablet, Take 1 tablet by mouth 3 (Three) Times a Day., Disp: 30 tablet, Rfl: 1  •  isosorbide mononitrate (IMDUR) 30 MG 24 hr tablet, 30 mg Daily., Disp: , Rfl:   •  nitroglycerin (NITROSTAT) 0.4 MG SL tablet, Place 0.4 mg under the tongue Every 5 (Five) Minutes As Needed for Chest Pain., Disp: , Rfl:   •  omeprazole (priLOSEC) 40 MG capsule, Take 1  capsule by mouth Daily., Disp: 30 capsule, Rfl: 0    Past Medical History:   Diagnosis Date   • Arthritis    • CHF (congestive heart failure) (CMS/HCC)     dr crow    • Childhood asthma    • Colon polyp    • Coronary artery disease    • Dysuria    • Elevated PSA    • GERD (gastroesophageal reflux disease)    • History of pulmonary embolism     yrs ago    • Hyperlipidemia    • Myocardial infarction (CMS/HCC)    • Prostate cancer (CMS/HCC)    • Seborrheic keratosis 2019    Left Judaism    • Shingles        Past Surgical History:   Procedure Laterality Date   • APPENDECTOMY     • CARDIAC DEFIBRILLATOR PLACEMENT     • CHOLECYSTECTOMY     • COLONOSCOPY     • COLONOSCOPY N/A 10/20/2017    Procedure: COLONOSCOPY WITH ANESTHESIA;  Surgeon: Donny Mock MD;  Location: [facility] ENDOSCOPY;  Service:    • CORONARY ARTERY BYPASS GRAFT  2011    done at [facility] dr gibson   • ENDOSCOPY  2016   • ENDOSCOPY N/A 10/20/2017    Procedure: ESOPHAGOGASTRODUODENOSCOPY WITH ANESTHESIA;  Surgeon: Donny Mock MD;  Location: [facility] ENDOSCOPY;  Service:    • EXCISION LESION Left 2019    Left Judaism  Path- Inflamed pigmented seborrheic keratosis   • HIP SURGERY Left    • KNEE ARTHROSCOPY Right 2018    Procedure: KNEE ARTHROSCOPY WITH MENISCECTOMY;  Surgeon: Hernesto Rodriguez MD;  Location: [facility] OR;  Service: Orthopedics   • PACEMAKER IMPLANTATION      defibrillator also [employer]   • PROSTATECTOMY Bilateral 10/22/2019    Procedure: RADICAL PROSTATECTOMY LAPAROSCOPIC WITH DAVINCI SI ROBOT;  Surgeon: Haim Warren MD;  [facility];  [facility]       Social History     Socioeconomic History   • Marital status:      Spouse name: Not on file   • Number of children: Not on file   • Years of education: Not on file   • Highest education level: Not on file   Tobacco Use   • Smoking status: Former Smoker     Quit date: 10/20/1978     Years since quittin.1   • Smokeless tobacco:  "Never Used   Substance and Sexual Activity   • Alcohol use: No   • Drug use: No   • Sexual activity: Defer       Family History   Problem Relation Age of Onset   • Colon cancer Father        Objective    Temp 98 °F (36.7 °C) (Temporal)   Ht 177.8 cm (70\")   Wt 79.2 kg (174 lb 9.6 oz)   BMI 25.05 kg/m²     Physical Exam  Vitals signs reviewed.   Constitutional:       General: He is not in acute distress.     Appearance: He is well-developed. He is not diaphoretic.   Pulmonary:      Effort: Pulmonary effort is normal.   Abdominal:      General: There is no distension.      Palpations: Abdomen is soft. There is no mass.      Tenderness: There is no abdominal tenderness. There is no guarding or rebound.      Hernia: No hernia is present.   Skin:     General: Skin is warm and dry.   Neurological:      Mental Status: He is alert and oriented to person, place, and time.             Results for orders placed or performed in visit on 12/17/20   POC Urinalysis Dipstick, Multipro    Specimen: Urine   Result Value Ref Range    Color Yellow Yellow, Straw, Dark Yellow, Sinai    Clarity, UA Clear Clear    Glucose, UA Negative Negative, 1000 mg/dL (3+) mg/dL    Bilirubin Negative Negative    Ketones, UA Trace (A) Negative    Specific Gravity  1.025 1.005 - 1.030    Blood, UA 2+ (A) Negative    pH, Urine 6.0 5.0 - 8.0    Protein, POC Negative Negative mg/dL    Urobilinogen, UA Normal Normal    Nitrite, UA Negative Negative    Leukocytes Negative Negative     Assessment and Plan    Diagnoses and all orders for this visit:    1. Prostate cancer (CMS/[facility]) (Primary)  -     POC Urinalysis Dipstick, Multipro  -     PSA DIAGNOSTIC; Future    2. Impotence of organic origin      Patient with favorable risk intermediate prostate cancer.  PSA was 7.2.  He underwent truss biopsy on 9/11/2019 was found to have [place] 3+4 = 7 adenocarcinoma the prostate in 1 out of 12 cores at the right base.      He underwent robotic prostatectomy on October " 22, 2019 which revealed [place] 3+4 = 7 adenocarcinoma prostate with negative surgical margins.  Postoperative PSA is undetectable.     ROSENDO has resolved.        He is on isosorbide mononitrate which precludes the use of Viagra or Cialis.  Not interested in injection therapy at this point.     Follow-up in 6 months with repeat PSA.

## 2020-12-17 ENCOUNTER — OFFICE VISIT (OUTPATIENT)
Dept: UROLOGY | Facility: CLINIC | Age: 75
End: 2020-12-17

## 2020-12-17 VITALS — BODY MASS INDEX: 25 KG/M2 | TEMPERATURE: 98 F | WEIGHT: 174.6 LBS | HEIGHT: 70 IN

## 2020-12-17 DIAGNOSIS — N52.9 IMPOTENCE OF ORGANIC ORIGIN: ICD-10-CM

## 2020-12-17 DIAGNOSIS — C61 PROSTATE CANCER (HCC): Primary | ICD-10-CM

## 2020-12-17 LAB
BILIRUB BLD-MCNC: NEGATIVE MG/DL
CLARITY, POC: CLEAR
COLOR UR: YELLOW
GLUCOSE UR STRIP-MCNC: NEGATIVE MG/DL
KETONES UR QL: ABNORMAL
LEUKOCYTE EST, POC: NEGATIVE
NITRITE UR-MCNC: NEGATIVE MG/ML
PH UR: 6 [PH] (ref 5–8)
PROT UR STRIP-MCNC: NEGATIVE MG/DL
RBC # UR STRIP: ABNORMAL /UL
SP GR UR: 1.02 (ref 1–1.03)
UROBILINOGEN UR QL: NORMAL

## 2020-12-17 PROCEDURE — 81003 URINALYSIS AUTO W/O SCOPE: CPT | Performed by: UROLOGY

## 2020-12-17 PROCEDURE — 99213 OFFICE O/P EST LOW 20 MIN: CPT | Performed by: UROLOGY

## 2020-12-17 RX ORDER — ATORVASTATIN CALCIUM 20 MG/1
TABLET, FILM COATED ORAL
COMMUNITY
Start: 2020-11-11 | End: 2021-07-02

## 2020-12-22 ENCOUNTER — TELEPHONE (OUTPATIENT)
Dept: CARDIOLOGY | Age: 75
End: 2020-12-22

## 2020-12-22 PROCEDURE — 93295 DEV INTERROG REMOTE 1/2/MLT: CPT | Performed by: CLINICAL NURSE SPECIALIST

## 2020-12-22 PROCEDURE — 93296 REM INTERROG EVL PM/IDS: CPT | Performed by: CLINICAL NURSE SPECIALIST

## 2021-01-06 ENCOUNTER — OFFICE VISIT (OUTPATIENT)
Dept: CARDIOLOGY CLINIC | Age: 76
End: 2021-01-06
Payer: MEDICARE

## 2021-01-06 VITALS
HEIGHT: 70 IN | DIASTOLIC BLOOD PRESSURE: 60 MMHG | BODY MASS INDEX: 24.91 KG/M2 | WEIGHT: 174 LBS | SYSTOLIC BLOOD PRESSURE: 128 MMHG | HEART RATE: 67 BPM

## 2021-01-06 DIAGNOSIS — Z95.810 AICD (AUTOMATIC CARDIOVERTER/DEFIBRILLATOR) PRESENT: ICD-10-CM

## 2021-01-06 DIAGNOSIS — I10 ESSENTIAL HYPERTENSION: ICD-10-CM

## 2021-01-06 DIAGNOSIS — I25.10 CORONARY ARTERY DISEASE INVOLVING NATIVE CORONARY ARTERY OF NATIVE HEART WITHOUT ANGINA PECTORIS: ICD-10-CM

## 2021-01-06 DIAGNOSIS — I25.5 ISCHEMIC CARDIOMYOPATHY: Primary | ICD-10-CM

## 2021-01-06 PROCEDURE — 4040F PNEUMOC VAC/ADMIN/RCVD: CPT | Performed by: CLINICAL NURSE SPECIALIST

## 2021-01-06 PROCEDURE — 1123F ACP DISCUSS/DSCN MKR DOCD: CPT | Performed by: CLINICAL NURSE SPECIALIST

## 2021-01-06 PROCEDURE — G8420 CALC BMI NORM PARAMETERS: HCPCS | Performed by: CLINICAL NURSE SPECIALIST

## 2021-01-06 PROCEDURE — G8427 DOCREV CUR MEDS BY ELIG CLIN: HCPCS | Performed by: CLINICAL NURSE SPECIALIST

## 2021-01-06 PROCEDURE — G8484 FLU IMMUNIZE NO ADMIN: HCPCS | Performed by: CLINICAL NURSE SPECIALIST

## 2021-01-06 PROCEDURE — 3017F COLORECTAL CA SCREEN DOC REV: CPT | Performed by: CLINICAL NURSE SPECIALIST

## 2021-01-06 PROCEDURE — 93289 INTERROG DEVICE EVAL HEART: CPT | Performed by: CLINICAL NURSE SPECIALIST

## 2021-01-06 PROCEDURE — 1036F TOBACCO NON-USER: CPT | Performed by: CLINICAL NURSE SPECIALIST

## 2021-01-06 PROCEDURE — 99213 OFFICE O/P EST LOW 20 MIN: CPT | Performed by: CLINICAL NURSE SPECIALIST

## 2021-01-06 NOTE — PROGRESS NOTES
CATHETERIZATION  6/14/11    DR Tano Bryant    CARDIAC CATHETERIZATION  6/20/2013  [facility]    EF 20%    CARDIAC DEFIBRILLATOR PLACEMENT  9/6/13    CHOLECYSTECTOMY  04/2016    CORONARY ARTERY BYPASS GRAFT  6/16/11    PACABG X 4, LIMA-DIAG, SVG-PLM, SVG-LAD, SVG-OM, RT LEG EVH, DR Kayla Medellin    HIP SURGERY      Growth in hip removed    KNEE ARTHROSCOPY      PROSTATE SURGERY      PROSTATE SURGERY  03/18/2020     Family History   Problem Relation Age of Onset    Diabetes Mother     Cancer Sister         Pancreatic     Social History     Tobacco Use    Smoking status: Former Smoker     Types: Cigarettes    Smokeless tobacco: Never Used   Substance Use Topics    Alcohol use: No      Current Outpatient Medications   Medication Sig Dispense Refill    simvastatin (ZOCOR) 10 MG tablet TAKE 1 TABLET EVERY NIGHT. Labs are under media. 90 tablet 3    carvedilol (COREG) 6.25 MG tablet Take 0.5 tablets by mouth 2 times daily 90 tablet 1    ondansetron (ZOFRAN) 4 MG tablet Take 1 tablet by mouth 3 times daily as needed for Nausea or Vomiting 15 tablet 0    isosorbide mononitrate (IMDUR) 30 MG extended release tablet TAKE 1 TABLET EVERY DAY 90 tablet 3    nitroGLYCERIN (NITROSTAT) 0.4 MG SL tablet Place 1 tablet under the tongue every 5 minutes as needed for Chest pain 25 tablet 3    aspirin 81 MG EC tablet Take 81 mg by mouth daily. No current facility-administered medications for this visit. Allergies: Patient has no known allergies. Review of Systems  Constitutional - no significant activity change, appetite change, or unexpected weight change. No fever, chills or diaphoresis. No fatigue. HEENT - no significant rhinorrhea or epistaxis. No tinnitus or significant hearing loss. Eyes - no sudden vision change or amaurosis. Respiratory - no significant wheezing, stridor, apnea or cough. No dyspnea on exertion or shortness of breath. Cardiovascular - no exertional chest pain, orthopnea or PND.   No sensation of arrhythmia or slow heart rate. No claudication or leg edema. Gastrointestinal - no abdominal swelling or pain. No blood in stool. No severe constipation, diarrhea, nausea, or vomiting. Genitourinary - no difficulty urinating, dysuria, frequency, or urgency. No flank pain or hematuria. Musculoskeletal - no back pain, gait disturbance, or myalgia. Skin - no color change or rash. No pallor. No new surgical incision. Neurologic - no speech difficulty, facial asymmetry or lateralizing weakness. No seizures, presyncope, syncope, or significant dizziness. Hematologic - no easy bruising or excessive bleeding. Psychiatric - no severe anxiety or insomnia. No confusion. All other review of systems are negative. Objective  Vital Signs - /60   Pulse 67   Ht 5' 10\" (1.778 m)   Wt 174 lb (78.9 kg)   BMI 24.97 kg/m²   General - Chrissy Rizo is alert, cooperative, and pleasant. Well groomed. No acute distress. Body habitus is normal.  HEENT - The head is normocephalic. No circumoral cyanosis. Dentition is normal.   EYES -  No Xanthelasma, no arcus senilis, no conjunctival hemorrhages or discharge. Neck - Supple, without increased jugular venous pressures. No carotid bruits. No mass. Respiratory - Lungs are clear bilaterally. No wheezes or rales. Normal effort without use of accessory muscles. Cardiovascular - Heart has regular rhythm and rate. No murmurs, rubs or gallops. + pedal pulses and no varicosities. Abdominal -  Soft, nontender, nondistended. Bowel sounds are intact. Extremities - No clubbing, cyanosis, or  edema. Musculoskeletal -  No clubbing . No Osler's nodes. Gait normal .  No kyphosis or scoliosis. Skin - no statis ulcers or dermatitis. Neurological - No focal signs are identified. Oriented to person, place and time. Psychiatric -  Appropriate affect and mood. Assessment:     Diagnosis Orders   1. Ischemic cardiomyopathy     2. Coronary artery disease involving native coronary artery of native heart without angina pectoris     3. AICD (automatic cardioverter/defibrillator) present     4. Essential hypertension       Data:  BP Readings from Last 3 Encounters:   01/06/21 128/60   07/15/20 125/73   06/23/20 128/82    Pulse Readings from Last 3 Encounters:   01/06/21 67   07/15/20 68   06/23/20 73        Wt Readings from Last 3 Encounters:   01/06/21 174 lb (78.9 kg)   07/14/20 168 lb (76.2 kg)   06/23/20 170 lb (77.1 kg)     ICD interrogation showed adequate battery voltage   (2.66 V) and charge time with  normal diagnostics, no firings and no sustained dysrhythmias. Optivol stable. Mode VVI at 40  Recheck in 3 mos. Via [facility] monitoring system    Blood pressure and heart rate well controlled. Medical management includes beta-blocker, long-acting nitrate statin and aspirin  Exercising regularly with no change of exercise tolerance. PCP managing labs  States taking medications as prescribed  Stable cardiovascular status. No evidence of overt heart failure, angina or dysrhythmia. Plan  [facility]link will sen in 3 mos  Follow up in 6 mos  With  ICD check   Call with any questions or concerns  Follow up with Letty Steele MD for non cardiac problems  Report any new problems  Cardiovascular Fitness-Exercise as tolerated. Strive for 30 minutes of exercise most days of the week. Cardiac / Healthy Diet  Continue current medications as directed  Continue plan of treatment  It is always recommended that you bring your medications bottles with you to each visit - this is for your safety! JONATHAN Engel    EMR dragon/transcription disclaimer: Much of this encounter note is electronic transcription/translation of spoken language to printed tach. Electronic translation of spoken language may be erroneous, or at times, nonsensical words or phrases may be inadvertently transcribed.  Although, I have reviewed the note for such errors, some may still exist.

## 2021-02-02 RX ORDER — CARVEDILOL 6.25 MG/1
TABLET ORAL
Qty: 90 TABLET | Refills: 3 | Status: SHIPPED | OUTPATIENT
Start: 2021-02-02 | End: 2022-02-03 | Stop reason: SDUPTHER

## 2021-02-14 ENCOUNTER — IMMUNIZATION (OUTPATIENT)
Age: 76
End: 2021-02-14
Payer: MEDICARE

## 2021-02-14 PROCEDURE — 0001A PR IMM ADMN SARSCOV2 30MCG/0.3ML DIL RECON 1ST DOSE: CPT | Performed by: FAMILY MEDICINE

## 2021-02-14 PROCEDURE — 91300 COVID-19, PFIZER VACCINE 30MCG/0.3ML DOSE: CPT | Performed by: FAMILY MEDICINE

## 2021-03-07 ENCOUNTER — IMMUNIZATION (OUTPATIENT)
Age: 76
End: 2021-03-07
Payer: MEDICARE

## 2021-03-07 PROCEDURE — 91300 COVID-19, PFIZER VACCINE 30MCG/0.3ML DOSE: CPT | Performed by: FAMILY MEDICINE

## 2021-03-07 PROCEDURE — 0002A PR IMM ADMN SARSCOV2 30MCG/0.3ML DIL RECON 2ND DOSE: CPT | Performed by: FAMILY MEDICINE

## 2021-04-07 DIAGNOSIS — I25.5 ISCHEMIC CARDIOMYOPATHY: ICD-10-CM

## 2021-04-07 DIAGNOSIS — Z95.810 AICD (AUTOMATIC CARDIOVERTER/DEFIBRILLATOR) PRESENT: Primary | ICD-10-CM

## 2021-04-07 PROCEDURE — 93296 REM INTERROG EVL PM/IDS: CPT | Performed by: CLINICAL NURSE SPECIALIST

## 2021-04-07 PROCEDURE — 93295 DEV INTERROG REMOTE 1/2/MLT: CPT | Performed by: CLINICAL NURSE SPECIALIST

## 2021-04-30 DIAGNOSIS — I25.10 CORONARY ARTERY DISEASE INVOLVING NATIVE CORONARY ARTERY OF NATIVE HEART WITHOUT ANGINA PECTORIS: ICD-10-CM

## 2021-04-30 RX ORDER — ISOSORBIDE MONONITRATE 30 MG/1
TABLET, EXTENDED RELEASE ORAL
Qty: 90 TABLET | Refills: 3 | Status: SHIPPED | OUTPATIENT
Start: 2021-04-30 | End: 2022-06-30 | Stop reason: SDUPTHER

## 2021-05-19 DIAGNOSIS — I25.5 ISCHEMIC CARDIOMYOPATHY: ICD-10-CM

## 2021-05-19 DIAGNOSIS — Z95.810 AICD (AUTOMATIC CARDIOVERTER/DEFIBRILLATOR) PRESENT: Primary | ICD-10-CM

## 2021-05-19 NOTE — PROGRESS NOTES
Remote ICD interrogation  Presenting rhythm:VS  <0.1%  Battery status: 2.63 V  Lead status: lead impedance within range and stable  Sensing:  R waves 6.4 mV  Thresholds: Ventricular 1.000 @ 0.4ms  Observations:  None  Next office visit:  07/07/21  Next remote home check scheduled for 06/21/21 battery check

## 2021-06-11 LAB — PSA SERPL-MCNC: <0.014 NG/ML (ref 0–4)

## 2021-06-21 DIAGNOSIS — Z95.810 AICD (AUTOMATIC CARDIOVERTER/DEFIBRILLATOR) PRESENT: ICD-10-CM

## 2021-06-21 DIAGNOSIS — I25.5 ISCHEMIC CARDIOMYOPATHY: Primary | ICD-10-CM

## 2021-07-02 ENCOUNTER — OFFICE VISIT (OUTPATIENT)
Dept: UROLOGY | Facility: CLINIC | Age: 76
End: 2021-07-02

## 2021-07-02 VITALS — HEIGHT: 70 IN | BODY MASS INDEX: 24.54 KG/M2 | WEIGHT: 171.4 LBS | TEMPERATURE: 97.8 F

## 2021-07-02 DIAGNOSIS — C61 PROSTATE CANCER (HCC): Primary | ICD-10-CM

## 2021-07-02 DIAGNOSIS — N52.9 IMPOTENCE OF ORGANIC ORIGIN: ICD-10-CM

## 2021-07-02 PROCEDURE — 81003 URINALYSIS AUTO W/O SCOPE: CPT | Performed by: UROLOGY

## 2021-07-02 PROCEDURE — 99213 OFFICE O/P EST LOW 20 MIN: CPT | Performed by: UROLOGY

## 2021-07-02 RX ORDER — SIMVASTATIN 10 MG
TABLET ORAL
Status: ON HOLD | COMMUNITY
Start: 2021-06-08 | End: 2022-10-24

## 2021-07-07 ENCOUNTER — OFFICE VISIT (OUTPATIENT)
Dept: CARDIOLOGY CLINIC | Age: 76
End: 2021-07-07
Payer: MEDICARE

## 2021-07-07 VITALS
SYSTOLIC BLOOD PRESSURE: 122 MMHG | BODY MASS INDEX: 24.2 KG/M2 | HEART RATE: 88 BPM | DIASTOLIC BLOOD PRESSURE: 72 MMHG | WEIGHT: 169 LBS | HEIGHT: 70 IN

## 2021-07-07 DIAGNOSIS — I10 ESSENTIAL HYPERTENSION: ICD-10-CM

## 2021-07-07 DIAGNOSIS — Z95.810 AICD (AUTOMATIC CARDIOVERTER/DEFIBRILLATOR) PRESENT: ICD-10-CM

## 2021-07-07 DIAGNOSIS — I25.10 CORONARY ARTERY DISEASE INVOLVING NATIVE CORONARY ARTERY OF NATIVE HEART WITHOUT ANGINA PECTORIS: Primary | ICD-10-CM

## 2021-07-07 DIAGNOSIS — I25.5 ISCHEMIC CARDIOMYOPATHY: ICD-10-CM

## 2021-07-07 PROCEDURE — 93289 INTERROG DEVICE EVAL HEART: CPT | Performed by: CLINICAL NURSE SPECIALIST

## 2021-07-07 PROCEDURE — 1036F TOBACCO NON-USER: CPT | Performed by: CLINICAL NURSE SPECIALIST

## 2021-07-07 PROCEDURE — 3017F COLORECTAL CA SCREEN DOC REV: CPT | Performed by: CLINICAL NURSE SPECIALIST

## 2021-07-07 PROCEDURE — G8420 CALC BMI NORM PARAMETERS: HCPCS | Performed by: CLINICAL NURSE SPECIALIST

## 2021-07-07 PROCEDURE — 1123F ACP DISCUSS/DSCN MKR DOCD: CPT | Performed by: CLINICAL NURSE SPECIALIST

## 2021-07-07 PROCEDURE — 4040F PNEUMOC VAC/ADMIN/RCVD: CPT | Performed by: CLINICAL NURSE SPECIALIST

## 2021-07-07 PROCEDURE — 99214 OFFICE O/P EST MOD 30 MIN: CPT | Performed by: CLINICAL NURSE SPECIALIST

## 2021-07-07 PROCEDURE — G8427 DOCREV CUR MEDS BY ELIG CLIN: HCPCS | Performed by: CLINICAL NURSE SPECIALIST

## 2021-07-07 NOTE — PROGRESS NOTES
[id number] [facility]  [facility] [address]  [address]  Phone: [phone]  Fax: [phone]    OFFICE VISIT:  2021    Luci Valles - : 1945    Reason For Visit:  Leyda Kemp is a 78 y.o. male who is here for 6 Month Follow-Up (no cardiac symptoms), Cardiomyopathy (AICD), Hypertension, and Coronary Artery Disease  CABG ×4 in   EF 20%. AICD placement   Negative Meghann scan for ischemia with known previous infarct May 2016  He returns today in follow-up. He continues to be active walking regularly. He has had no decline or change in activity tolerance. Saba Lisa denies exertional chest pain, shortness of breath, orthopnea, paroxysmal nocturnal dyspnea, syncope, presyncope, arrhythmia, edema and fatigue. The patient denies numbness or weakness to suggest cerebrovascular accident or transient ischemic attack. Sara Garcia MD is PCP and follows labs .   Luci Valles [facility] the [facility]:    Patient Active Problem List    Diagnosis Date Noted    Hx of CABG 10/03/2019    Pancreatitis, unspecified pancreatitis type 2020    Nausea & vomiting 2020    Idiopathic pancreatitis 2016    AICD (automatic cardioverter/defibrillator) present     Ischemic cardiomyopathy     Hyperlipidemia 2011    HTN (hypertension) 2011    CAD (coronary artery disease)      Past Medical History:   Diagnosis Date    Additional heart attack (inferior wall)     AICD (automatic cardioverter/defibrillator) present 13  MDL    CAD (coronary artery disease)     SEVERE MULTIVESSEL    Hx of blood clots     Post op hip surgery    Hyperlipidemia     PCP manages     Ischemic cardiomyopathy     SEVERE    Pancreatitis     Prostate CA [facility])      Past Surgical History:   Procedure Laterality Date    APPENDECTOMY      CARDIAC CATHETERIZATION  11    DR Cardozo Seat    CARDIAC CATHETERIZATION  2013  MDL    EF 20%    CARDIAC DEFIBRILLATOR PLACEMENT  9/6/13    CHOLECYSTECTOMY  04/2016    CORONARY ARTERY BYPASS GRAFT  6/16/11    PACABG X 4, LIMA-DIAG, SVG-PLM, SVG-LAD, SVG-OM, RT LEG EV, DR David Galeana    HIP SURGERY      Growth in hip removed    KNEE ARTHROSCOPY      PROSTATE SURGERY      PROSTATE SURGERY  03/18/2020     Family History   Problem Relation Age of Onset    Diabetes Mother     Cancer Sister         Pancreatic     Social History     Tobacco Use    Smoking status: Former Smoker     Types: Cigarettes    Smokeless tobacco: Never Used   Substance Use Topics    Alcohol use: No      Current Outpatient Medications   Medication Sig Dispense Refill    isosorbide mononitrate (IMDUR) 30 MG extended release tablet TAKE 1 TABLET EVERY DAY 90 tablet 3    carvedilol (COREG) 6.25 MG tablet TAKE 1/2 TABLET TWICE DAILY 90 tablet 3    simvastatin (ZOCOR) 10 MG tablet TAKE 1 TABLET EVERY NIGHT. Labs are under media. 90 tablet 3    nitroGLYCERIN (NITROSTAT) 0.4 MG SL tablet Place 1 tablet under the tongue every 5 minutes as needed for Chest pain 25 tablet 3    aspirin 81 MG EC tablet Take 81 mg by mouth daily.  ondansetron (ZOFRAN) 4 MG tablet Take 1 tablet by mouth 3 times daily as needed for Nausea or Vomiting (Patient not taking: Reported on 7/7/2021) 15 tablet 0     No current facility-administered medications for this visit. Allergies: Patient has no known allergies. Review of Systems  Constitutional - no significant activity change, appetite change, or unexpected weight change. No fever, chills or diaphoresis. No fatigue. HEENT - no significant rhinorrhea or epistaxis. No tinnitus or significant hearing loss. Eyes - no sudden vision change or amaurosis. Respiratory - no significant wheezing, stridor, apnea or cough. No dyspnea on exertion or shortness of breath. Cardiovascular - no exertional chest pain, orthopnea or PND. No sensation of arrhythmia or slow heart rate.    No claudication or leg Ischemic cardiomyopathy     3. AICD (automatic cardioverter/defibrillator) present     4. Essential hypertension       Data:  BP Readings from Last 3 Encounters:   07/07/21 122/72   01/06/21 128/60   07/15/20 125/73    Pulse Readings from Last 3 Encounters:   07/07/21 88   01/06/21 67   07/15/20 68        Wt Readings from Last 3 Encounters:   07/07/21 169 lb (76.7 kg)   01/06/21 174 lb (78.9 kg)   07/14/20 168 lb (76.2 kg)   ICD interrogation showed aging battery status currently at 2.62V but has not tripped to RRT charge time 12.5 seconds  no firings and no sustained dysrhythmias. Optivol stable. Mode VVI at 36  Demonstrated alert tones when reaching elective replacement time. Explained that will automatically send us CareLink transmission as well with his home monitor    Blood pressure heart rate well controlled. Medical management includes beta-blocker, long-acting nitrate, aspirin statin    Patient is quite active is done quite well for a long time. Follows with primary care regularly. PCP is managing labs     States taking medications as prescribed  Stable cardiovascular status. No evidence of overt heart failure, angina or dysrhythmia. 30 minutes were spent preparing, reviewing and seeing patient. All questions answered    Plan  ICD will send in 1 mos or when it reaches elective replacement   Will call to set up the generator change at that point  Follow up in 6 mos    Call with any questions or concerns  Follow up with Kimberly Olson MD for non cardiac problems and labs   Report any new problems  Cardiovascular Fitness-Exercise as tolerated. Strive for 30 minutes of exercise most days of the week. Cardiac / Healthy Diet  Continue current medications as directed  Continue plan of treatment  It is always recommended that you bring your medications bottles with you to each visit - this is for your safety!        JONATHAN Gipson    EMR dragon/transcription disclaimer: Much of this encounter note is electronic transcription/translation of spoken language to printed tach. Electronic translation of spoken language may be erroneous, or at times, nonsensical words or phrases may be inadvertently transcribed.  Although, I have reviewed the note for such errors, some may still exist.

## 2021-07-07 NOTE — PATIENT INSTRUCTIONS
ICD will send in 1 mos or when it reaches elective replacement   Will call to set up the generator change at that point  Follow up in 6 mos    Call with any questions or concerns  Follow up with Pedro Tierney MD for non cardiac problems and labs   Report any new problems  Cardiovascular Fitness-Exercise as tolerated. Strive for 30 minutes of exercise most days of the week. Cardiac / Healthy Diet  Continue current medications as directed  Continue plan of treatment  It is always recommended that you bring your medications bottles with you to each visit - this is for your safety!

## 2021-08-09 DIAGNOSIS — I25.5 ISCHEMIC CARDIOMYOPATHY: ICD-10-CM

## 2021-08-09 DIAGNOSIS — Z95.810 AICD (AUTOMATIC CARDIOVERTER/DEFIBRILLATOR) PRESENT: Primary | ICD-10-CM

## 2021-09-10 DIAGNOSIS — Z95.810 AICD (AUTOMATIC CARDIOVERTER/DEFIBRILLATOR) PRESENT: Primary | ICD-10-CM

## 2021-09-10 DIAGNOSIS — I25.5 ISCHEMIC CARDIOMYOPATHY: ICD-10-CM

## 2021-09-10 PROCEDURE — 93296 REM INTERROG EVL PM/IDS: CPT | Performed by: CLINICAL NURSE SPECIALIST

## 2021-09-10 PROCEDURE — 93295 DEV INTERROG REMOTE 1/2/MLT: CPT | Performed by: CLINICAL NURSE SPECIALIST

## 2021-10-11 DIAGNOSIS — I25.5 ISCHEMIC CARDIOMYOPATHY: ICD-10-CM

## 2021-10-11 DIAGNOSIS — Z95.810 AICD (AUTOMATIC CARDIOVERTER/DEFIBRILLATOR) PRESENT: Primary | ICD-10-CM

## 2021-11-10 DIAGNOSIS — I25.5 ISCHEMIC CARDIOMYOPATHY: ICD-10-CM

## 2021-11-10 DIAGNOSIS — Z95.810 AICD (AUTOMATIC CARDIOVERTER/DEFIBRILLATOR) PRESENT: Primary | ICD-10-CM

## 2021-11-30 DIAGNOSIS — Z95.810 AICD (AUTOMATIC CARDIOVERTER/DEFIBRILLATOR) PRESENT: Primary | ICD-10-CM

## 2021-11-30 DIAGNOSIS — I25.5 ISCHEMIC CARDIOMYOPATHY: ICD-10-CM

## 2021-12-06 ENCOUNTER — TELEPHONE (OUTPATIENT)
Dept: CARDIOLOGY CLINIC | Age: 76
End: 2021-12-06

## 2021-12-06 NOTE — TELEPHONE ENCOUNTER
----- Message from JONATHAN Green sent at 11/30/2021 12:25 PM CST -----  Will call and get him scheduled for generator replacement soon

## 2021-12-06 NOTE — TELEPHONE ENCOUNTER
Patient left a voice message on Friday returning call. Dr. Calhoun's MA left a message today for patient to return call.

## 2021-12-07 NOTE — TELEPHONE ENCOUNTER
Patient is scheduled for 12/28 at 7 arrival for 9 procedure. Covid swab is not needed due to being fully vaccinated. Patient wished to be scheduled this far out from having other things going on that he can't miss or be on restrictions. Patient voiced understanding of all of this and had patient repeat back to me. Other instructions given below. [place] at the [address] [place] [address] and [address] [facility] located on the first floor of [place] [address] through hospital main entrance and turn immediately to your left. Generator replacement    Prep Instructions:    1. Stop warfarin/Coumadin two days prior to this procedure. 2.  Do not eat or drink anything after midnight the night before your procedure. May take morning medications with a sip of water unless otherwise directed not to.  3.  You should arrange to have someone take you home rather than drive yourself. 4.  Further plans will be made following your procedure. If for any reason you are unable to keep this appointment, please contact [facility], [phone], as soon as possible to reschedule.

## 2021-12-28 ENCOUNTER — HOSPITAL ENCOUNTER (OUTPATIENT)
Dept: CARDIAC CATH/INVASIVE PROCEDURES | Age: 76
Discharge: HOME | End: 2021-12-28
Attending: INTERNAL MEDICINE | Admitting: INTERNAL MEDICINE
Payer: MEDICARE

## 2021-12-28 VITALS
DIASTOLIC BLOOD PRESSURE: 70 MMHG | HEIGHT: 70 IN | WEIGHT: 170 LBS | RESPIRATION RATE: 17 BRPM | OXYGEN SATURATION: 97 % | BODY MASS INDEX: 24.34 KG/M2 | SYSTOLIC BLOOD PRESSURE: 103 MMHG | TEMPERATURE: 96.7 F | HEART RATE: 64 BPM

## 2021-12-28 LAB
ALBUMIN SERPL-MCNC: 4.2 G/DL (ref 3.5–5.2)
ALP BLD-CCNC: 99 U/L (ref 40–130)
ALT SERPL-CCNC: 26 U/L (ref 5–41)
ANION GAP SERPL CALCULATED.3IONS-SCNC: 11 MMOL/L (ref 7–19)
AST SERPL-CCNC: 25 U/L (ref 5–40)
BILIRUB SERPL-MCNC: 0.4 MG/DL (ref 0.2–1.2)
BUN BLDV-MCNC: 15 MG/DL (ref 8–23)
CALCIUM SERPL-MCNC: 9.1 MG/DL (ref 8.8–10.2)
CHLORIDE BLD-SCNC: 106 MMOL/L (ref 98–111)
CO2: 23 MMOL/L (ref 22–29)
CREAT SERPL-MCNC: 0.9 MG/DL (ref 0.5–1.2)
EKG P AXIS: 54 DEGREES
EKG P-R INTERVAL: 158 MS
EKG Q-T INTERVAL: 434 MS
EKG QRS DURATION: 118 MS
EKG QTC CALCULATION (BAZETT): 441 MS
EKG T AXIS: 38 DEGREES
GFR AFRICAN AMERICAN: >59
GFR NON-AFRICAN AMERICAN: >60
GLUCOSE BLD-MCNC: 133 MG/DL (ref 74–109)
HCT VFR BLD CALC: 40.5 % (ref 42–52)
HEMOGLOBIN: 13.5 G/DL (ref 14–18)
MCH RBC QN AUTO: 31.3 PG (ref 27–31)
MCHC RBC AUTO-ENTMCNC: 33.3 G/DL (ref 33–37)
MCV RBC AUTO: 93.8 FL (ref 80–94)
PDW BLD-RTO: 11.9 % (ref 11.5–14.5)
PLATELET # BLD: 230 K/UL (ref 130–400)
PMV BLD AUTO: 10.4 FL (ref 9.4–12.4)
POTASSIUM REFLEX MAGNESIUM: 4.1 MMOL/L (ref 3.5–5)
RBC # BLD: 4.32 M/UL (ref 4.7–6.1)
SODIUM BLD-SCNC: 140 MMOL/L (ref 136–145)
TOTAL PROTEIN: 6.7 G/DL (ref 6.6–8.7)
WBC # BLD: 5.9 K/UL (ref 4.8–10.8)

## 2021-12-28 PROCEDURE — 93005 ELECTROCARDIOGRAM TRACING: CPT | Performed by: INTERNAL MEDICINE

## 2021-12-28 PROCEDURE — 80053 COMPREHEN METABOLIC PANEL: CPT

## 2021-12-28 PROCEDURE — 6370000000 HC RX 637 (ALT 250 FOR IP): Performed by: INTERNAL MEDICINE

## 2021-12-28 PROCEDURE — 99152 MOD SED SAME PHYS/QHP 5/>YRS: CPT

## 2021-12-28 PROCEDURE — 2780000010 HC IMPLANT OTHER

## 2021-12-28 PROCEDURE — 2500000003 HC RX 250 WO HCPCS

## 2021-12-28 PROCEDURE — 93010 ELECTROCARDIOGRAM REPORT: CPT | Performed by: INTERNAL MEDICINE

## 2021-12-28 PROCEDURE — 2580000003 HC RX 258: Performed by: INTERNAL MEDICINE

## 2021-12-28 PROCEDURE — 99152 MOD SED SAME PHYS/QHP 5/>YRS: CPT | Performed by: INTERNAL MEDICINE

## 2021-12-28 PROCEDURE — 99024 POSTOP FOLLOW-UP VISIT: CPT | Performed by: INTERNAL MEDICINE

## 2021-12-28 PROCEDURE — 6360000002 HC RX W HCPCS

## 2021-12-28 PROCEDURE — 33262 RMVL& REPLC PULSE GEN 1 LEAD: CPT | Performed by: INTERNAL MEDICINE

## 2021-12-28 PROCEDURE — 36415 COLL VENOUS BLD VENIPUNCTURE: CPT

## 2021-12-28 PROCEDURE — 2720000010 HC SURG SUPPLY STERILE

## 2021-12-28 PROCEDURE — 33262 RMVL& REPLC PULSE GEN 1 LEAD: CPT

## 2021-12-28 PROCEDURE — 85027 COMPLETE CBC AUTOMATED: CPT

## 2021-12-28 PROCEDURE — 99219 PR INITIAL OBSERVATION CARE/DAY 50 MINUTES: CPT | Performed by: INTERNAL MEDICINE

## 2021-12-28 PROCEDURE — C1722 AICD, SINGLE CHAMBER: HCPCS

## 2021-12-28 RX ORDER — ROSUVASTATIN CALCIUM 10 MG/1
10 TABLET, COATED ORAL DAILY
COMMUNITY
Start: 2021-11-03

## 2021-12-28 RX ORDER — CEFAZOLIN SODIUM 2 G/100ML
2000 INJECTION, SOLUTION INTRAVENOUS
Status: DISCONTINUED | OUTPATIENT
Start: 2021-12-28 | End: 2021-12-28 | Stop reason: HOSPADM

## 2021-12-28 RX ORDER — SODIUM CHLORIDE 9 MG/ML
INJECTION, SOLUTION INTRAVENOUS CONTINUOUS
Status: DISCONTINUED | OUTPATIENT
Start: 2021-12-28 | End: 2021-12-28 | Stop reason: HOSPADM

## 2021-12-28 RX ADMIN — MUPIROCIN: 20 OINTMENT TOPICAL at 07:46

## 2021-12-28 RX ADMIN — SODIUM CHLORIDE: 9 INJECTION, SOLUTION INTRAVENOUS at 07:44

## 2021-12-28 RX ADMIN — CHLORHEXIDINE GLUCONATE: 4 LIQUID TOPICAL at 08:08

## 2021-12-28 NOTE — PROGRESS NOTES
Returned post pacemaker generator replacement. Awake and alert. Incision site clear with gauze and tegaderm dressing in place. Denies any c/o pain. Wife at bedside.

## 2021-12-28 NOTE — PROGRESS NOTES
Patient and wife instructed on care of left upper chest site post pacemaker generator replacement. Given written instructions. Both stated understanding.

## 2021-12-28 NOTE — PROCEDURES
Cardiac Single Chamber ICD battery change Operative Report    Mervat Suarez  [id number]  12/28/2021    Surgeon: Dallas Soares    Anesthesia: Intravenous sedation and local anesthetic    Procedure(s):1. Single Chamber ICD battery change      Indications:   1. ICD battery end-of-life      Procedure Details  The risks, benefits, complications, treatment options, and expected outcomes were discussed with the patient. The patient and/or family concurred with the proposed plan, giving informed consent. Patient was prepped and draped in the usual strict sterile fashion. After the antibiotic was completely infused, 30 cc Sensorcaine was infiltrated into the left Infraclavicular area. First an incision was made over the previous ICD generator and then utilizing sharp and blunt dissection the pocket was identified entered and the device was freed from the surrounding subcutaneous tissues and delivered into the operative field. The lead was disconnected from the previous generator and a new generator was attached and the set screw was tightened. After having assured adequate hemostasis the leads were connected to the pulse generator and the pulse generator and lead was placed in the pocket. The pocket was copiously irrigated utilizing antibiotic solution. The pacemaker and lead system was visualized under fluoroscopy. Appropriate redundancy/slack in the lead was noted. The pin of the lead was beyond the set screw. Hemostasis was reverified. The pocket was then closed using 2-0 Vicryl for the deep layer and 3-0 Vicryl for the middle layer. Surgical staples were then applied to the skin and sterile dressing was applied. At the end of the procedure instrument, needle, and sponge counts were correct. An arm immobilizer was applied at this point and the patient was transferred. An independent trained observer administered medications under my direction.   The patient was continuously monitored with respect to level of consciousness, and vital signs/physiologic status throughout the case. For further details regarding specific medications and doses please refer to Cath Lab procedural notes. Anesthesia start time:1059  Anesthesia stop time:1118          Pacemaker Data:         Right Ventricular lead    Medtronic  Model   [id number]   serial #   [id number]  Volt    0.5    V     PW    0.4 ms     Current   NA   ma    I  Impedance:   304   ohms       Slew rate:   NA   V/sec  R wave:    6.4 mv      Generator  Medtronic  Model   [id number]  Serial   [id number]        Estimated Blood Loss:  Minimal         Complications:  None; patient tolerated the procedure well. Disposition:  To cath holding           Condition: stable      Jose White MD 12/28/2021 11:24 AM

## 2021-12-28 NOTE — H&P
Office Visit    2021  [facility] JONATHAN Montesinos    Cardiology  Coronary artery disease involving native coronary artery of native heart without angina pectoris +3 more    Dx  6 Month Follow-Up   Cardiomyopathy   Hypertension  Coronary Artery Disease    Reason for Visit       Progress Notes  JONATHAN Cruz (Nurse Practitioner) [facility]  [facility]  [address] [place], [address] [facility] [address] [address]  Phone: [phone]  Fax: [phone]     OFFICE VISIT:  2021     Everrett Boeck - : 1945     Reason For Visit:  Amna Werner is a 78 y.o. male who is here for 6 Month Follow-Up (no cardiac symptoms), Cardiomyopathy (AICD), Hypertension, and Coronary Artery Disease  CABG ×4 in   EF 20%. AICD placement   Negative Meghann scan for ischemia with known previous infarct May 2016  He returns today in follow-up. He continues to be active walking regularly. He has had no decline or change in activity tolerance.        Subjective  Amna Werner denies exertional chest pain, shortness of breath, orthopnea, paroxysmal nocturnal dyspnea, syncope, presyncope, arrhythmia, edema and fatigue. The patient denies numbness or weakness to suggest cerebrovascular accident or transient ischemic attack. Tima Willams MD is PCP and follows labs .   Everrett Boeck has the following history as recorded in [facility]:          Patient Active Problem List     Diagnosis Date Noted    Hx of CABG 10/03/2019    Pancreatitis, unspecified pancreatitis type 2020    Nausea & vomiting 2020    Idiopathic pancreatitis 2016    AICD (automatic cardioverter/defibrillator) present      Ischemic cardiomyopathy      Hyperlipidemia 2011    HTN (hypertension) 2011    CAD (coronary artery disease)        Past Medical History        Past Medical History:   Diagnosis Date    Additional heart attack (inferior wall)      AICD (automatic cardioverter/defibrillator) present 9/6/13  MDL    CAD (coronary artery disease)       SEVERE MULTIVESSEL    Hx of blood clots 1973     Post op hip surgery    Hyperlipidemia       PCP manages     Ischemic cardiomyopathy       SEVERE    Pancreatitis      Prostate CA [facility])           Past Surgical History         Past Surgical History:   Procedure Laterality Date    APPENDECTOMY        CARDIAC CATHETERIZATION   6/14/11     DR Hortencia Lin    CARDIAC CATHETERIZATION   6/20/2013  MDL     EF 20%    CARDIAC DEFIBRILLATOR PLACEMENT   9/6/13    CHOLECYSTECTOMY   04/2016    CORONARY ARTERY BYPASS GRAFT   6/16/11     PACABG X 4, LIMA-DIAG, SVG-PLM, SVG-LAD, SVG-OM, RT LEG EVH, DR Rayne Morales    HIP SURGERY         Growth in hip removed    KNEE ARTHROSCOPY        PROSTATE SURGERY        PROSTATE SURGERY   03/18/2020         Family History         Family History   Problem Relation Age of Onset    Diabetes Mother      Cancer Sister           Pancreatic         Social History            Tobacco Use    Smoking status: Former Smoker       Types: Cigarettes    Smokeless tobacco: Never Used   Substance Use Topics    Alcohol use: No      Current Facility-Administered Medications          Current Outpatient Medications   Medication Sig Dispense Refill    isosorbide mononitrate (IMDUR) 30 MG extended release tablet TAKE 1 TABLET EVERY DAY 90 tablet 3    carvedilol (COREG) 6.25 MG tablet TAKE 1/2 TABLET TWICE DAILY 90 tablet 3    simvastatin (ZOCOR) 10 MG tablet TAKE 1 TABLET EVERY NIGHT. Labs are under media.  90 tablet 3    nitroGLYCERIN (NITROSTAT) 0.4 MG SL tablet Place 1 tablet under the tongue every 5 minutes as needed for Chest pain 25 tablet 3    aspirin 81 MG EC tablet Take 81 mg by mouth daily.          ondansetron (ZOFRAN) 4 MG tablet Take 1 tablet by mouth 3 times daily as needed for Nausea or Vomiting (Patient not taking: Reported on 7/7/2021) 15 tablet 0      No current facility-administered medications for this visit.         Allergies: Patient has no known allergies.     Review of Systems  Constitutional - no significant activity change, appetite change, or unexpected weight change. No fever, chills or diaphoresis. No fatigue. HEENT - no significant rhinorrhea or epistaxis. No tinnitus or significant hearing loss. Eyes - no sudden vision change or amaurosis. Respiratory - no significant wheezing, stridor, apnea or cough. No dyspnea on exertion or shortness of breath. Cardiovascular - no exertional chest pain, orthopnea or PND. No sensation of arrhythmia or slow heart rate. No claudication or leg edema. Gastrointestinal - no abdominal swelling or pain. No blood in stool. No severe constipation, diarrhea, nausea, or vomiting. Genitourinary - no difficulty urinating, dysuria, frequency, or urgency. No flank pain or hematuria. Musculoskeletal - no back pain, gait disturbance, or myalgia. Skin - no color change or rash. No pallor. No new surgical incision. Neurologic - no speech difficulty, facial asymmetry or lateralizing weakness. No seizures, presyncope, syncope, or significant dizziness. Hematologic - no easy bruising or excessive bleeding. Psychiatric - no severe anxiety or insomnia. No confusion. All other review of systems are negative.        Objective  Vital Signs - /72   Pulse 88   Ht 5' 10\" (1.778 m)   Wt 169 lb (76.7 kg)   BMI 24.25 kg/m²   General - Kami Staley is alert, cooperative, and pleasant. Well groomed. No acute distress. Body habitus is normal.  HEENT - The head is normocephalic. No circumoral cyanosis. Dentition is normal.   EYES -  No Xanthelasma, no arcus senilis, no conjunctival hemorrhages or discharge. Neck - Supple, without increased jugular venous pressures. No carotid bruits. No mass. Respiratory - Lungs are clear bilaterally. No wheezes or rales. Normal effort without use of accessory muscles.   Cardiovascular - Heart has regular rhythm and rate.  No murmurs, rubs or gallops. + pedal pulses and no varicosities. Abdominal -  Soft, nontender, nondistended. Bowel sounds are intact. Extremities - No clubbing, cyanosis, or  edema. Musculoskeletal -  No clubbing . No Osler's nodes. Gait normal .  No kyphosis or scoliosis. Skin -  no statis ulcers or dermatitis. Neurological - No focal signs are identified. Oriented to person, place and time. Psychiatric -  Appropriate affect and mood.         Assessment:      Diagnosis Orders   1. Coronary artery disease involving native coronary artery of native heart without angina pectoris      2. Ischemic cardiomyopathy      3. AICD (automatic cardioverter/defibrillator) present      4. Essential hypertension         Data:      BP Readings from Last 3 Encounters:   07/07/21 122/72   01/06/21 128/60   07/15/20 125/73         Pulse Readings from Last 3 Encounters:   07/07/21 88   01/06/21 67   07/15/20 68              Wt Readings from Last 3 Encounters:   07/07/21 169 lb (76.7 kg)   01/06/21 174 lb (78.9 kg)   07/14/20 168 lb (76.2 kg)   ICD interrogation showed aging battery status currently at 2.62V but has not tripped to RRT charge time 12.5 seconds  no firings and no sustained dysrhythmias. Optivol stable. Mode VVI at 36  Demonstrated alert tones when reaching elective replacement time. Explained that will automatically send us CareLink transmission as well with his home monitor     Blood pressure heart rate well controlled. Medical management includes beta-blocker, long-acting nitrate, aspirin statin     Patient is quite active is done quite well for a long time. Follows with primary care regularly. PCP is managing labs      States taking medications as prescribed  Stable cardiovascular status. No evidence of overt heart failure, angina or dysrhythmia. 30 minutes were spent preparing, reviewing and seeing patient.   All questions answered     Plan  ICD will send in 1 mos or when it reaches elective replacement   Will call to set up the generator change at that point  Follow up in 6 mos    Call with any questions or concerns  Follow up with Kali Garcia MD for non cardiac problems and labs   Report any new problems  Cardiovascular Fitness-Exercise as tolerated. Strive for 30 minutes of exercise most days of the week. Cardiac / Healthy Diet  Continue current medications as directed  Continue plan of treatment  It is always recommended that you bring your medications bottles with you to each visit - this is for your safety!         JONATHAN Marquez     EMR dragon/transcription disclaimer: Much of this encounter note is electronic transcription/translation of spoken language to printed tach. Electronic translation of spoken language may be erroneous, or at times, nonsensical words or phrases may be inadvertently transcribed. Although, I have reviewed the note for such errors, some may still exist.            Here for elective ICD generator change end-of-life advise indication alternatives benefits and risk patient agreeable plan today.   I have discussed with the patient regarding indications for the proposed procedure ICD/ Pacemaker implantation along with possible alternatives benefits and risks including but not limited to risks of death, myocardial infarction, stroke, contrast induced nephropathy which in some cases may lead to acute kidney failure requiring dialysis, allergic reactions, infections which may require treatment with antibiotics or removal of the device, bleeding requiring blood transfusion,  cardiac arrhthymias, respiratory failure which may require placing the patient on respiratory support such as a ventilator or breathing machine,risk of complications which may require vascular surgery,risks of collapsing the lung with development of a pneumothorax which may require placement of a chest tube, and risks of lead dislodgement which may require follow up surgery and repositioning of the leads. In addition there are long term risks including infection, and device or component failure which may require removal and/or replacement of various components. Risk of wound nonhealing t subsequent erosion etc. also discussed. We also discussed the risk of potential stroke or thromboembolic phenomena during the timeframe that the patient may be off off of anticoagulation. The patient is advised the device battery will eventually become depleted and require replacement at some point. The patient is awake and alert and understands the issues involved and indicates willingness to proceed as ordered. The patient is  a reasonable candidate for moderate conscious sedation. ASA score:  ASA 3 - Patient with moderate systemic disease with functional limitations    Mallampati: I (soft palate, uvula, fauces, tonsillar pillars visible)    Preferred vascular access site will be: left subclavian vein.

## 2022-01-03 ENCOUNTER — OFFICE VISIT (OUTPATIENT)
Dept: UROLOGY | Facility: CLINIC | Age: 77
End: 2022-01-03

## 2022-01-03 VITALS — HEIGHT: 70 IN | WEIGHT: 176.4 LBS | TEMPERATURE: 98.3 F | BODY MASS INDEX: 25.25 KG/M2

## 2022-01-03 DIAGNOSIS — N52.9 IMPOTENCE OF ORGANIC ORIGIN: ICD-10-CM

## 2022-01-03 DIAGNOSIS — C61 PROSTATE CANCER: Primary | ICD-10-CM

## 2022-01-03 LAB
BILIRUB BLD-MCNC: NEGATIVE MG/DL
CLARITY, POC: CLEAR
COLOR UR: YELLOW
GLUCOSE UR STRIP-MCNC: NEGATIVE MG/DL
KETONES UR QL: NEGATIVE
LEUKOCYTE EST, POC: NEGATIVE
NITRITE UR-MCNC: NEGATIVE MG/ML
PH UR: 5 [PH] (ref 5–8)
PROT UR STRIP-MCNC: NEGATIVE MG/DL
RBC # UR STRIP: ABNORMAL /UL
SP GR UR: 1 (ref 1–1.03)
UROBILINOGEN UR QL: NORMAL

## 2022-01-03 PROCEDURE — 81001 URINALYSIS AUTO W/SCOPE: CPT | Performed by: UROLOGY

## 2022-01-03 PROCEDURE — 99213 OFFICE O/P EST LOW 20 MIN: CPT | Performed by: UROLOGY

## 2022-01-03 RX ORDER — ROSUVASTATIN CALCIUM 10 MG/1
1 TABLET, COATED ORAL DAILY
COMMUNITY
Start: 2021-11-03

## 2022-01-07 ENCOUNTER — NURSE ONLY (OUTPATIENT)
Dept: CARDIOLOGY CLINIC | Age: 77
End: 2022-01-07
Payer: MEDICARE

## 2022-01-07 DIAGNOSIS — Z95.810 AICD (AUTOMATIC CARDIOVERTER/DEFIBRILLATOR) PRESENT: Primary | ICD-10-CM

## 2022-01-07 DIAGNOSIS — Z48.89 ENCOUNTER FOR POST SURGICAL WOUND CHECK: ICD-10-CM

## 2022-01-07 PROCEDURE — 99024 POSTOP FOLLOW-UP VISIT: CPT | Performed by: CLINICAL NURSE SPECIALIST

## 2022-01-07 PROCEDURE — 93289 INTERROG DEVICE EVAL HEART: CPT | Performed by: CLINICAL NURSE SPECIALIST

## 2022-01-07 NOTE — PROGRESS NOTES
Diagnosis Orders   1. AICD (automatic cardioverter/defibrillator) present     2. Encounter for post surgical wound check       12/28/2021 AICD at end-of-life. Generator replacement performed by Dr. Eva Gerber  Here today for wound check. Left upper chest wall has dry bandage in place. Removed. Staples intact. No redness or drainage noted. Staples removed easily. No bleeding. Instructed to keep clean and dry can wash with antibacterial soap. May apply dry dressing if has any oozing. We will see him back as regular follow-up in about 4 weeks    Device working appropriately.   Has CareLink monitor from previous device  Noted arrhythmias on interrogation were at time of implant    Picture of wound under media

## 2022-01-10 ENCOUNTER — TELEPHONE (OUTPATIENT)
Dept: CARDIOLOGY CLINIC | Age: 77
End: 2022-01-10

## 2022-02-03 RX ORDER — CARVEDILOL 6.25 MG/1
TABLET ORAL
Qty: 90 TABLET | Refills: 3 | Status: SHIPPED | OUTPATIENT
Start: 2022-02-03

## 2022-02-07 ENCOUNTER — TELEPHONE (OUTPATIENT)
Dept: CARDIOLOGY CLINIC | Age: 77
End: 2022-02-07

## 2022-02-08 ENCOUNTER — OFFICE VISIT (OUTPATIENT)
Dept: CARDIOLOGY CLINIC | Age: 77
End: 2022-02-08
Payer: MEDICARE

## 2022-02-08 VITALS
HEIGHT: 70 IN | BODY MASS INDEX: 25.2 KG/M2 | DIASTOLIC BLOOD PRESSURE: 72 MMHG | HEART RATE: 61 BPM | WEIGHT: 176 LBS | SYSTOLIC BLOOD PRESSURE: 118 MMHG

## 2022-02-08 DIAGNOSIS — I25.5 ISCHEMIC CARDIOMYOPATHY: Primary | ICD-10-CM

## 2022-02-08 DIAGNOSIS — Z95.810 AICD (AUTOMATIC CARDIOVERTER/DEFIBRILLATOR) PRESENT: ICD-10-CM

## 2022-02-08 DIAGNOSIS — I25.10 CORONARY ARTERY DISEASE INVOLVING NATIVE CORONARY ARTERY OF NATIVE HEART WITHOUT ANGINA PECTORIS: ICD-10-CM

## 2022-02-08 DIAGNOSIS — I10 ESSENTIAL HYPERTENSION: ICD-10-CM

## 2022-02-08 PROCEDURE — 3017F COLORECTAL CA SCREEN DOC REV: CPT | Performed by: CLINICAL NURSE SPECIALIST

## 2022-02-08 PROCEDURE — 1036F TOBACCO NON-USER: CPT | Performed by: CLINICAL NURSE SPECIALIST

## 2022-02-08 PROCEDURE — 99214 OFFICE O/P EST MOD 30 MIN: CPT | Performed by: CLINICAL NURSE SPECIALIST

## 2022-02-08 PROCEDURE — G8417 CALC BMI ABV UP PARAM F/U: HCPCS | Performed by: CLINICAL NURSE SPECIALIST

## 2022-02-08 PROCEDURE — 4040F PNEUMOC VAC/ADMIN/RCVD: CPT | Performed by: CLINICAL NURSE SPECIALIST

## 2022-02-08 PROCEDURE — G8427 DOCREV CUR MEDS BY ELIG CLIN: HCPCS | Performed by: CLINICAL NURSE SPECIALIST

## 2022-02-08 PROCEDURE — 1123F ACP DISCUSS/DSCN MKR DOCD: CPT | Performed by: CLINICAL NURSE SPECIALIST

## 2022-02-08 PROCEDURE — G8484 FLU IMMUNIZE NO ADMIN: HCPCS | Performed by: CLINICAL NURSE SPECIALIST

## 2022-02-08 PROCEDURE — 93289 INTERROG DEVICE EVAL HEART: CPT | Performed by: CLINICAL NURSE SPECIALIST

## 2022-02-08 NOTE — PROGRESS NOTES
[facility], [address] , [place] [address]  Phone: [phone]  Fax: [phone]    OFFICE VISIT:  22    Yenifer Osman - : 1945    Reason For Visit:  Mazin Loya is a 78 y.o. male who is here for Follow-up (no cardiac symptoms), Coronary Artery Disease, and Cardiomyopathy (AICD)   from a recent hospitalization for generator replacement. CABG ×4 in -nuclear stress test 16 showed old inferior infarction with no new ischemia with EF 35%  EF 20%. AICD placement   With generator replacement 21    Today for follow-up. He states there is been no change in activity tolerance. He has no cardiac complaints. Alicia Mccormick denies exertional chest pain, shortness of breath, orthopnea, paroxysmal nocturnal dyspnea, syncope, presyncope, arrythmia, edema and fatigue. The patient denies numbness or weakness to suggest cerebrovascular accident or transient ischemic attack.       Yenifer Osman has the following history as recorded in [facility]:    Patient Active Problem List    Diagnosis Date Noted    ICD (implantable cardioverter-defibrillator) battery depletion     Hx of CABG 10/03/2019    Pancreatitis, unspecified pancreatitis type 2020    Nausea & vomiting 2020    Idiopathic pancreatitis 2016    AICD (automatic cardioverter/defibrillator) present     Ischemic cardiomyopathy     Hyperlipidemia 2011    HTN (hypertension) 2011    CAD (coronary artery disease)      Past Medical History:   Diagnosis Date    Additional heart attack (inferior wall)     AICD (automatic cardioverter/defibrillator) present 13  MDL    Arthritis     CAD (coronary artery disease)     SEVERE MULTIVESSEL    Hx of blood clots     Post op hip surgery    Hyperlipidemia     PCP manages     Hypertension     Ischemic cardiomyopathy     SEVERE    Pancreatitis     Prostate CA [facility])      Past Surgical History:   Procedure Laterality Date    APPENDECTOMY      CARDIAC CATHETERIZATION  11    DR Freddy Valenzuela CARDIAC CATHETERIZATION  2013  MDL    EF 20%    CARDIAC DEFIBRILLATOR PLACEMENT  13    CARDIAC PACEMAKER PLACEMENT      CHOLECYSTECTOMY  2016    COLONOSCOPY      CORONARY ARTERY BYPASS GRAFT  11    PACABG X 4, LIMA-DIAG, SVG-PLM, SVG-LAD, SVG-OM, RT LEG EVH, DR Pj Morejon    HIP SURGERY      Growth in hip removed    KNEE ARTHROSCOPY      PACEMAKER PLACEMENT      aicd    PROSTATE SURGERY      PROSTATE SURGERY  2020     Family History   Problem Relation Age of Onset    Diabetes Mother     Cancer Sister         Pancreatic     Social History     Tobacco Use    Smoking status: Former Smoker     Types: Cigarettes     Quit date:      Years since quittin.1    Smokeless tobacco: Never Used   Substance Use Topics    Alcohol use: No      Current Outpatient Medications   Medication Sig Dispense Refill    carvedilol (COREG) 6.25 MG tablet TAKE 1/2 TABLET TWICE DAILY 90 tablet 3    rosuvastatin (CRESTOR) 10 MG tablet Take 10 mg by mouth daily       isosorbide mononitrate (IMDUR) 30 MG extended release tablet TAKE 1 TABLET EVERY DAY 90 tablet 3    nitroGLYCERIN (NITROSTAT) 0.4 MG SL tablet Place 1 tablet under the tongue every 5 minutes as needed for Chest pain 25 tablet 3    aspirin 81 MG EC tablet Take 81 mg by mouth daily.  simvastatin (ZOCOR) 10 MG tablet TAKE 1 TABLET EVERY NIGHT. Labs are under media. (Patient not taking: Reported on 2022) 90 tablet 3    ondansetron (ZOFRAN) 4 MG tablet Take 1 tablet by mouth 3 times daily as needed for Nausea or Vomiting (Patient not taking: Reported on 2021) 15 tablet 0     No current facility-administered medications for this visit. Allergies: Patient has no known allergies. Review of Systems  Constitutional  no significant activity change, appetite change, or unexpected weight change. No fever, chills or diaphoresis.   No fatigue. Respiratory  no significant wheezing, stridor, apnea or cough. No dyspnea on exertion or shortness of breath. Cardiovascular  no exertional chest pain, orthopnea or PND. No sensation of arrythmia or slow heart rate. No claudication or leg edema. Gastrointestinal  no abdominal swelling or pain. No blood in stool. No severe constipation, diarrhea, nausea, or vomiting. Genitourinary  no difficulty urinating, dysuria, frequency, or urgency. No flank pain or hematuria. Musculoskeletal  no back pain, gait disturbance, or myalgia. Skin  no color change or rash. No pallor. No redness or drainage from device site. Neurologic  no speech difficulty, facial asymmetry or lateralizing weakness. No seizures, presyncope, syncope, or significant dizziness. Hematologic  no easy bruising or excessive bleeding. Psychiatric  no severe anxiety or insomnia. No confusion. All other review of systems are negative. Objective  Vital Signs - /72   Pulse 61   Ht 5' 10\" (1.778 m)   Wt 176 lb (79.8 kg)   BMI 25.25 kg/m²   General - Lilia Beaver is alert, cooperative, and pleasant. No acute distress. HEENT  The head is normocephalic. Neck - Supple, without increased jugular venous pressures. No carotid bruits. Lungs - Lungs are clear bilaterally. No wheezes or rales. Cardiovascular  Heart has regular rate and rhythm. No murmurs, rubs or gallops. Abdominal -  Soft, nontender, nondistended. Bowel sounds are intact. Extremities - No clubbing, cyanosis, or edema. Musculoskeletal - No musculoskeletal symptoms. Device site at left upper chest is well healed, no redness or drainage. Neurological - No focal signs are identified. Cranial nerves II through XII are grossly intact. Assessment:     Diagnosis Orders   1. Ischemic cardiomyopathy     2. AICD (automatic cardioverter/defibrillator) present     3.  Coronary artery disease involving native coronary artery of native heart without angina pectoris     4. Essential hypertension       Device interrogation shows appropriate diagnostics without sustained arrhythmias or ICD discharges. Mode VVI at 40  Received [facility] home monitoring system  Has sent initial transmission. Next transmission due in 3 mos    Active and doing well on appropriate medical management. Blood pressure and heart rate controlled. On beta-blocker, long-acting nitrate statin and aspirin  PCP managing labs  Discussed signs and symptoms to report. He stays fairly active    Stable cardiovascular status. No evidence of overt heart failure, angina or dysrhythmia. Plan  1. Return to see Dr. Carmen Cardenas in 6 months months. 2. Continue same medications. 3. Call with any problems, questions or concerns. 4. Continue to see primary care provider for non cardiac concerns. 5. Send [facility] in 3 mos- education provided.      JONATHAN Garza

## 2022-02-08 NOTE — PATIENT INSTRUCTIONS
1. Return to see Dr. Brayan Castanon in 6 months months. 2. Continue same medications. 3. Call with any problems, questions or concerns. 4. Continue to see primary care provider for non cardiac concerns. 5. Send [facility] in 3 mos- education provided.

## 2022-05-11 DIAGNOSIS — Z95.810 AICD (AUTOMATIC CARDIOVERTER/DEFIBRILLATOR) PRESENT: Primary | ICD-10-CM

## 2022-05-11 DIAGNOSIS — I25.5 ISCHEMIC CARDIOMYOPATHY: ICD-10-CM

## 2022-05-11 PROCEDURE — 93296 REM INTERROG EVL PM/IDS: CPT | Performed by: CLINICAL NURSE SPECIALIST

## 2022-05-11 PROCEDURE — 93295 DEV INTERROG REMOTE 1/2/MLT: CPT | Performed by: CLINICAL NURSE SPECIALIST

## 2022-06-29 ENCOUNTER — LAB (OUTPATIENT)
Dept: UROLOGY | Facility: CLINIC | Age: 77
End: 2022-06-29

## 2022-06-29 DIAGNOSIS — C61 PROSTATE CANCER: ICD-10-CM

## 2022-06-30 DIAGNOSIS — I25.10 CORONARY ARTERY DISEASE INVOLVING NATIVE CORONARY ARTERY OF NATIVE HEART WITHOUT ANGINA PECTORIS: ICD-10-CM

## 2022-06-30 LAB — PSA SERPL-MCNC: <0.014 NG/ML (ref 0–4)

## 2022-06-30 RX ORDER — ISOSORBIDE MONONITRATE 30 MG/1
30 TABLET, EXTENDED RELEASE ORAL DAILY
Qty: 90 TABLET | Refills: 3 | Status: SHIPPED | OUTPATIENT
Start: 2022-06-30

## 2022-07-06 ENCOUNTER — OFFICE VISIT (OUTPATIENT)
Dept: UROLOGY | Facility: CLINIC | Age: 77
End: 2022-07-06

## 2022-07-06 VITALS — BODY MASS INDEX: 24.25 KG/M2 | HEIGHT: 70 IN | WEIGHT: 169.4 LBS

## 2022-07-06 DIAGNOSIS — N52.9 IMPOTENCE OF ORGANIC ORIGIN: ICD-10-CM

## 2022-07-06 DIAGNOSIS — C61 PROSTATE CANCER: Primary | ICD-10-CM

## 2022-07-06 LAB
BILIRUB BLD-MCNC: NEGATIVE MG/DL
CLARITY, POC: CLEAR
COLOR UR: YELLOW
GLUCOSE UR STRIP-MCNC: NEGATIVE MG/DL
KETONES UR QL: NEGATIVE
LEUKOCYTE EST, POC: NEGATIVE
NITRITE UR-MCNC: NEGATIVE MG/ML
PH UR: 6 [PH] (ref 5–8)
PROT UR STRIP-MCNC: NEGATIVE MG/DL
RBC # UR STRIP: ABNORMAL /UL
SP GR UR: 1.01 (ref 1–1.03)
UROBILINOGEN UR QL: NORMAL

## 2022-07-06 PROCEDURE — 81003 URINALYSIS AUTO W/O SCOPE: CPT | Performed by: UROLOGY

## 2022-07-06 PROCEDURE — 99213 OFFICE O/P EST LOW 20 MIN: CPT | Performed by: UROLOGY

## 2022-08-11 DIAGNOSIS — Z95.810 AICD (AUTOMATIC CARDIOVERTER/DEFIBRILLATOR) PRESENT: Primary | ICD-10-CM

## 2022-08-11 DIAGNOSIS — I25.5 ISCHEMIC CARDIOMYOPATHY: ICD-10-CM

## 2022-08-11 PROCEDURE — 93295 DEV INTERROG REMOTE 1/2/MLT: CPT | Performed by: NURSE PRACTITIONER

## 2022-08-11 PROCEDURE — 93296 REM INTERROG EVL PM/IDS: CPT | Performed by: NURSE PRACTITIONER

## 2022-08-23 ENCOUNTER — TELEPHONE (OUTPATIENT)
Dept: CARDIOLOGY CLINIC | Age: 77
End: 2022-08-23

## 2022-08-24 ENCOUNTER — OFFICE VISIT (OUTPATIENT)
Dept: CARDIOLOGY CLINIC | Age: 77
End: 2022-08-24
Payer: MEDICARE

## 2022-08-24 VITALS
BODY MASS INDEX: 23.91 KG/M2 | DIASTOLIC BLOOD PRESSURE: 78 MMHG | HEART RATE: 71 BPM | SYSTOLIC BLOOD PRESSURE: 122 MMHG | WEIGHT: 167 LBS | HEIGHT: 70 IN

## 2022-08-24 DIAGNOSIS — I25.10 CORONARY ARTERY DISEASE INVOLVING NATIVE CORONARY ARTERY OF NATIVE HEART WITHOUT ANGINA PECTORIS: Primary | ICD-10-CM

## 2022-08-24 DIAGNOSIS — I10 ESSENTIAL HYPERTENSION: ICD-10-CM

## 2022-08-24 DIAGNOSIS — Z95.810 AICD (AUTOMATIC CARDIOVERTER/DEFIBRILLATOR) PRESENT: ICD-10-CM

## 2022-08-24 DIAGNOSIS — I25.5 ISCHEMIC CARDIOMYOPATHY: ICD-10-CM

## 2022-08-24 PROCEDURE — 3017F COLORECTAL CA SCREEN DOC REV: CPT | Performed by: CLINICAL NURSE SPECIALIST

## 2022-08-24 PROCEDURE — 99214 OFFICE O/P EST MOD 30 MIN: CPT | Performed by: CLINICAL NURSE SPECIALIST

## 2022-08-24 PROCEDURE — G8420 CALC BMI NORM PARAMETERS: HCPCS | Performed by: CLINICAL NURSE SPECIALIST

## 2022-08-24 PROCEDURE — 1123F ACP DISCUSS/DSCN MKR DOCD: CPT | Performed by: CLINICAL NURSE SPECIALIST

## 2022-08-24 PROCEDURE — 93290 INTERROG DEV EVAL ICPMS IP: CPT | Performed by: CLINICAL NURSE SPECIALIST

## 2022-08-24 PROCEDURE — G8427 DOCREV CUR MEDS BY ELIG CLIN: HCPCS | Performed by: CLINICAL NURSE SPECIALIST

## 2022-08-24 PROCEDURE — 1036F TOBACCO NON-USER: CPT | Performed by: CLINICAL NURSE SPECIALIST

## 2022-08-24 PROCEDURE — 93288 INTERROG EVL PM/LDLS PM IP: CPT | Performed by: CLINICAL NURSE SPECIALIST

## 2022-08-24 RX ORDER — NITROGLYCERIN 0.4 MG/1
0.4 TABLET SUBLINGUAL EVERY 5 MIN PRN
Qty: 25 TABLET | Refills: 3 | Status: SHIPPED | OUTPATIENT
Start: 2022-08-24

## 2022-08-24 RX ORDER — NITROGLYCERIN 0.4 MG/1
0.4 TABLET SUBLINGUAL EVERY 5 MIN PRN
Qty: 25 TABLET | Refills: 3 | Status: CANCELLED | OUTPATIENT
Start: 2022-08-24

## 2022-08-24 NOTE — PROGRESS NOTES
[facility]  [facility] [address]  [address]  Phone: [phone]  Fax: [phone]    OFFICE VISIT:  2022    Myah Rosales - : 1945    Reason For Visit:  Deedee Herndon is a 78 y.o. male who is here for 6 Month Follow-Up (No cardiac symptoms), Cardiomyopathy, Coronary Artery Disease, and Hypertension (AICD)  CABG ×4 in -nuclear stress test 16 showed old inferior infarction with no new ischemia with EF 35%  EF 20%. AICD placement   With generator replacement 21     Today for follow-up. He states there is been no change in activity tolerance. He has no cardiac complaints. Dmitry Kee denies exertional chest pain, shortness of breath, orthopnea, paroxysmal nocturnal dyspnea, syncope, presyncope, arrhythmia, edema and fatigue. The patient denies numbness or weakness to suggest cerebrovascular accident or transient ischemic attack. Clemente Troy MD is PCP and follows labs including lipids.   Myah Rosales has the following history as recorded in [facility]:    Patient Active Problem List    Diagnosis Date Noted    ICD (implantable cardioverter-defibrillator) battery depletion     Hx of CABG 10/03/2019    Pancreatitis, unspecified pancreatitis type 2020    Nausea & vomiting 2020    Idiopathic pancreatitis 2016    AICD (automatic cardioverter/defibrillator) present     Ischemic cardiomyopathy     Hyperlipidemia 2011    HTN (hypertension) 2011    CAD (coronary artery disease)      Past Medical History:   Diagnosis Date    Additional heart attack (inferior wall)     AICD (automatic cardioverter/defibrillator) present 13  MDL    Arthritis     CAD (coronary artery disease)     SEVERE MULTIVESSEL    Hx of blood clots     Post op hip surgery    Hyperlipidemia     PCP manages     Hypertension     Ischemic cardiomyopathy     SEVERE    Pancreatitis     Prostate CA [facility])      Past Surgical History:   Procedure Laterality Date APPENDECTOMY      CARDIAC CATHETERIZATION  11    DR Manuela Duckworth    CARDIAC CATHETERIZATION  2013  MDL    EF 20%    CARDIAC DEFIBRILLATOR PLACEMENT  13    CARDIAC PACEMAKER PLACEMENT      CHOLECYSTECTOMY  2016    COLONOSCOPY      CORONARY ARTERY BYPASS GRAFT  11    PACABG X 4, LIMA-DIAG, SVG-PLM, SVG-LAD, SVG-OM, RT LEG EVH, DR MYLES    HIP SURGERY      Growth in hip removed    KNEE ARTHROSCOPY      PACEMAKER PLACEMENT      aicd    PROSTATE SURGERY      PROSTATE SURGERY  2020     Family History   Problem Relation Age of Onset    Diabetes Mother     Cancer Sister         Pancreatic     Social History     Tobacco Use    Smoking status: Former     Types: Cigarettes     Quit date:      Years since quittin.6    Smokeless tobacco: Never   Substance Use Topics    Alcohol use: No      Current Outpatient Medications   Medication Sig Dispense Refill    isosorbide mononitrate (IMDUR) 30 MG extended release tablet Take 1 tablet by mouth daily 90 tablet 3    carvedilol (COREG) 6.25 MG tablet TAKE 1/2 TABLET TWICE DAILY 90 tablet 3    rosuvastatin (CRESTOR) 10 MG tablet Take 10 mg by mouth daily       nitroGLYCERIN (NITROSTAT) 0.4 MG SL tablet Place 1 tablet under the tongue every 5 minutes as needed for Chest pain 25 tablet 3    aspirin 81 MG EC tablet Take 81 mg by mouth daily. ondansetron (ZOFRAN) 4 MG tablet Take 1 tablet by mouth 3 times daily as needed for Nausea or Vomiting (Patient not taking: No sig reported) 15 tablet 0     No current facility-administered medications for this visit. Allergies: Patient has no known allergies. Review of Systems  Constitutional - no significant activity change, appetite change, or unexpected weight change. No fever, chills or diaphoresis. No fatigue. HEENT - no significant rhinorrhea or epistaxis. No tinnitus or significant hearing loss. Eyes - no sudden vision change or amaurosis.    Respiratory - no significant wheezing, stridor, apnea or cough. No dyspnea on exertion or shortness of breath. Cardiovascular - no exertional chest pain, orthopnea or PND. No sensation of arrhythmia or slow heart rate. No claudication or leg edema. Gastrointestinal - no abdominal swelling or pain. No blood in stool. No severe constipation, diarrhea, nausea, or vomiting. Genitourinary - no difficulty urinating, dysuria, frequency, or urgency. No flank pain or hematuria. Musculoskeletal - no back pain, gait disturbance, or myalgia. Skin - no color change or rash. No pallor. No new surgical incision. Neurologic - no speech difficulty, facial asymmetry or lateralizing weakness. No seizures, presyncope, syncope, or significant dizziness. Hematologic - no easy bruising or excessive bleeding. Psychiatric - no severe anxiety or insomnia. No confusion. All other review of systems are negative. Objective  Vital Signs - /78   Pulse 71   Ht 5' 10\" (1.778 m)   Wt 167 lb (75.8 kg)   BMI 23.96 kg/m²   General - Nicholas Lung is alert, cooperative, and pleasant. Well groomed. No acute distress. Body habitus is normal.  HEENT - The head is normocephalic. No circumoral cyanosis. Dentition is normal.   EYES -  No Xanthelasma, no arcus senilis, no conjunctival hemorrhages or discharge. Neck - Supple, without increased jugular venous pressures. No carotid bruits. No mass. Respiratory - Lungs are clear bilaterally. No wheezes or rales. Normal effort without use of accessory muscles. Cardiovascular - Heart has regular rhythm and rate. No murmurs, rubs or gallops. + pedal pulses and no varicosities. Abdominal -  Soft, nontender, nondistended. Bowel sounds are intact. Extremities - No clubbing, cyanosis, or  edema. Musculoskeletal -  No clubbing . No Osler's nodes. Gait normal .  No kyphosis or scoliosis. Skin -  no statis ulcers or dermatitis. Neurological - No focal signs are identified.   Oriented to person, place and electronic transcription/translation of spoken language to printed tach. Electronic translation of spoken language may be erroneous, or at times, nonsensical words or phrases may be inadvertently transcribed.  Although, I have reviewed the note for such errors, some may still exist.

## 2022-10-10 ENCOUNTER — OFFICE VISIT (OUTPATIENT)
Dept: GASTROENTEROLOGY | Facility: CLINIC | Age: 77
End: 2022-10-10

## 2022-10-10 VITALS
TEMPERATURE: 97.2 F | DIASTOLIC BLOOD PRESSURE: 64 MMHG | OXYGEN SATURATION: 99 % | HEIGHT: 70 IN | HEART RATE: 77 BPM | BODY MASS INDEX: 24.62 KG/M2 | WEIGHT: 172 LBS | SYSTOLIC BLOOD PRESSURE: 128 MMHG

## 2022-10-10 DIAGNOSIS — Z86.010 HX OF COLONIC POLYP: Primary | ICD-10-CM

## 2022-10-10 DIAGNOSIS — Z80.0 FAMILY HX OF COLON CANCER: ICD-10-CM

## 2022-10-10 PROCEDURE — S0260 H&P FOR SURGERY: HCPCS | Performed by: NURSE PRACTITIONER

## 2022-10-10 RX ORDER — SOD SULF/POT CHLORIDE/MAG SULF 1.479 G
12 TABLET ORAL TAKE AS DIRECTED
Qty: 24 TABLET | Refills: 0 | Status: ON HOLD | OUTPATIENT
Start: 2022-10-10 | End: 2022-10-24

## 2022-10-10 NOTE — H&P (VIEW-ONLY)
[facility]    Primary Physician Josiah Martines MD    10/10/2022    Zackery Molina   1945      No chief complaint on file.  for colonoscopy     Subjective     HPI    Zackery Molina is a 77 y.o. male who presents as a referral for preventative maintenance. He has no complaints of nausea or vomiting. No change in bowels. No wt loss. No BRBPR. No melena. No abdominal pain.         COLONOSCOPY (10/20/2017 12:48)  Recall 5 years.   Tissue Pathology Exam - Tissue, Esophagus (10/20/2017 12:57)        There is not a family history of colon polyps. There is a family history of colon cancer father in his 70's.    Past Medical History:   Diagnosis Date   • Arthritis    • CHF (congestive heart failure) (HCC)     dr crow    • Childhood asthma    • Colon polyp    • Coronary artery disease    • Dysuria    • Elevated PSA    • GERD (gastroesophageal reflux disease)    • History of pulmonary embolism     yrs ago 1973   • Hyperlipidemia    • Myocardial infarction (HCC)    • Prostate cancer (HCC)    • Seborrheic keratosis 06/26/2019    Left [place]    • Shingles        Past Surgical History:   Procedure Laterality Date   • APPENDECTOMY     • CARDIAC DEFIBRILLATOR PLACEMENT     • CHOLECYSTECTOMY     • COLONOSCOPY     • COLONOSCOPY N/A 10/20/2017    Procedure: COLONOSCOPY WITH ANESTHESIA;  Surgeon: Donny Mock MD;  Location: [facility] ENDOSCOPY;  Service:    • CORONARY ARTERY BYPASS GRAFT  2011    done at [facility]   • ENDOSCOPY  04/06/2016   • ENDOSCOPY N/A 10/20/2017    Procedure: ESOPHAGOGASTRODUODENOSCOPY WITH ANESTHESIA;  Surgeon: Donny Mock MD;  Location: [facility] ENDOSCOPY;  Service:    • EXCISION LESION Left 06/26/2019    Left [place]  Path- Inflamed pigmented seborrheic keratosis   • HIP SURGERY Left    • KNEE ARTHROSCOPY Right 11/7/2018    Procedure: KNEE ARTHROSCOPY WITH MENISCECTOMY;  Surgeon: Hernesto Rodriguez MD;  Location: [facility] OR;  Service: Orthopedics   •  PACEMAKER IMPLANTATION      defibrillator also medtronic   • PROSTATECTOMY Bilateral 10/22/2019    Procedure: RADICAL PROSTATECTOMY LAPAROSCOPIC WITH DAVINCI SI ROBOT;  Surgeon: Haim Warren MD;  Location: [facility];  Service: [facility]       Outpatient Medications Marked as Taking for the 10/10/22 encounter (Office Visit) with Tanya Desir APRN   Medication Sig Dispense Refill   • aspirin 81 MG EC tablet Take 81 mg by mouth Daily. WILL STOP 18     • carvedilol (COREG) 3.125 MG tablet Take 3.125 mg by mouth 2 (Two) Times a Day With Meals.     • isosorbide mononitrate (IMDUR) 30 MG 24 hr tablet 30 mg Daily.     • nitroglycerin (NITROSTAT) 0.4 MG SL tablet Place 0.4 mg under the tongue Every 5 (Five) Minutes As Needed for Chest Pain.     • rosuvastatin (CRESTOR) 10 MG tablet Take 1 tablet by mouth Daily.         No Known Allergies    Social History     Socioeconomic History   • Marital status:    Tobacco Use   • Smoking status: Former     Types: Cigarettes     Quit date: 10/20/1978     Years since quittin.0   • Smokeless tobacco: Never   Vaping Use   • Vaping Use: Never used   Substance and Sexual Activity   • Alcohol use: No   • Drug use: No   • Sexual activity: Defer       Family History   Problem Relation Age of Onset   • Colon cancer Father    • Colon polyps Neg Hx        Review of Systems   Constitutional: Negative for chills, fever and unexpected weight change.   Respiratory: Negative for shortness of breath and wheezing.    Cardiovascular: Negative for chest pain and palpitations.   Gastrointestinal: Negative for abdominal distention, abdominal pain, anal bleeding, blood in stool, constipation, diarrhea, nausea and vomiting.       Objective     Vitals:    10/10/22 1002   BP: 128/64   Pulse: 77   Temp: 97.2 °F (36.2 °C)   SpO2: 99%         10/10/22  1002   Weight: 78 kg (172 lb)     Body mass index is 24.68 kg/m².    Physical Exam  Vitals reviewed.   Constitutional:       General: He  is not in acute distress.  Cardiovascular:      Rate and Rhythm: Normal rate and regular rhythm.      Heart sounds: Normal heart sounds.   Pulmonary:      Effort: Pulmonary effort is normal.      Breath sounds: Normal breath sounds.   Abdominal:      General: Bowel sounds are normal. There is no distension.      Palpations: Abdomen is soft.      Tenderness: There is no abdominal tenderness.   Skin:     General: Skin is warm and dry.   Neurological:      Mental Status: He is alert.         Imaging Results (Most Recent)     None          Assessment & Plan     Diagnoses and all orders for this visit:    1. Hx of colonic polyp (Primary)  -     Case Request; Standing  -     Implement Anesthesia Orders Day of Procedure; Standing  -     Obtain Informed Consent; Standing  -     Case Request    2. Family hx of colon cancer  -     Case Request; Standing  -     Implement Anesthesia Orders Day of Procedure; Standing  -     Obtain Informed Consent; Standing  -     Case Request    Other orders  -     Sodium Sulfate-Mag Sulfate-KCl (Sutab) 3601-041-725 MG tablet; Take 12 tablets by mouth Take As Directed.  Dispense: 24 tablet; Refill: 0    Plan for colonoscopy.                  COLONOSCOPY WITH ANESTHESIA (N/A)  All risks, benefits, alternatives, and indications of colonoscopy procedure have been discussed with the patient. Risks to include perforation of the colon requiring possible surgery or colostomy, risk of bleeding from biopsies or removal of colon tissue, possibility of missing a colon polyp or cancer, or adverse drug reaction.  Benefits to include the diagnosis and management of disease of the colon and rectum. Alternatives to include barium enema, radiographic evaluation, lab testing or no intervention. Pt verbalizes understanding and agrees.       MOODY Aponte

## 2022-10-10 NOTE — PROGRESS NOTES
[facility] Gastroenterology    Primary Physician Josiah Martines MD    10/10/2022    Zackery Moilna   1945      No chief complaint on file.  for colonoscopy     Subjective     HPI    Zackery Molina is a 77 y.o. male who presents as a referral for preventative maintenance. He has no complaints of nausea or vomiting. No change in bowels. No wt loss. No BRBPR. No melena. No abdominal pain.         COLONOSCOPY (10/20/2017 12:48)  Recall 5 years.   Tissue Pathology Exam - Tissue, Esophagus (10/20/2017 12:57)        There is not a family history of colon polyps. There is a family history of colon cancer father in his 70's.    Past Medical History:   Diagnosis Date   • Arthritis    • CHF (congestive heart failure) (HCC)     dr crow    • Childhood asthma    • Colon polyp    • Coronary artery disease    • Dysuria    • Elevated PSA    • GERD (gastroesophageal reflux disease)    • History of pulmonary embolism     yrs ago 1973   • Hyperlipidemia    • Myocardial infarction (HCC)    • Prostate cancer (HCC)    • Seborrheic keratosis 06/26/2019    Left [place]    • Shingles        Past Surgical History:   Procedure Laterality Date   • APPENDECTOMY     • CARDIAC DEFIBRILLATOR PLACEMENT     • CHOLECYSTECTOMY     • COLONOSCOPY     • COLONOSCOPY N/A 10/20/2017    Procedure: COLONOSCOPY WITH ANESTHESIA;  Surgeon: Donny Mock MD;  Location: [facility] ENDOSCOPY;  Service:    • CORONARY ARTERY BYPASS GRAFT  2011    done at [facility]   • ENDOSCOPY  04/06/2016   • ENDOSCOPY N/A 10/20/2017    Procedure: ESOPHAGOGASTRODUODENOSCOPY WITH ANESTHESIA;  Surgeon: Donny Mock MD;  Location: [facility] ENDOSCOPY;  Service:    • EXCISION LESION Left 06/26/2019    Left [place]  Path- Inflamed pigmented seborrheic keratosis   • HIP SURGERY Left    • KNEE ARTHROSCOPY Right 11/7/2018    Procedure: KNEE ARTHROSCOPY WITH MENISCECTOMY;  Surgeon: Hernesto Rodriguez MD;  Location: [facility] OR;  Service: Orthopedics   •  PACEMAKER IMPLANTATION      defibrillator also medtronic   • PROSTATECTOMY Bilateral 10/22/2019    Procedure: RADICAL PROSTATECTOMY LAPAROSCOPIC WITH DAVINCI SI ROBOT;  Surgeon: Haim Warren MD;  Location: [facility];  Service: [facility]       Outpatient Medications Marked as Taking for the 10/10/22 encounter (Office Visit) with Tanya Desir APRN   Medication Sig Dispense Refill   • aspirin 81 MG EC tablet Take 81 mg by mouth Daily. WILL STOP 18     • carvedilol (COREG) 3.125 MG tablet Take 3.125 mg by mouth 2 (Two) Times a Day With Meals.     • isosorbide mononitrate (IMDUR) 30 MG 24 hr tablet 30 mg Daily.     • nitroglycerin (NITROSTAT) 0.4 MG SL tablet Place 0.4 mg under the tongue Every 5 (Five) Minutes As Needed for Chest Pain.     • rosuvastatin (CRESTOR) 10 MG tablet Take 1 tablet by mouth Daily.         No Known Allergies    Social History     Socioeconomic History   • Marital status:    Tobacco Use   • Smoking status: Former     Types: Cigarettes     Quit date: 10/20/1978     Years since quittin.0   • Smokeless tobacco: Never   Vaping Use   • Vaping Use: Never used   Substance and Sexual Activity   • Alcohol use: No   • Drug use: No   • Sexual activity: Defer       Family History   Problem Relation Age of Onset   • Colon cancer Father    • Colon polyps Neg Hx        Review of Systems   Constitutional: Negative for chills, fever and unexpected weight change.   Respiratory: Negative for shortness of breath and wheezing.    Cardiovascular: Negative for chest pain and palpitations.   Gastrointestinal: Negative for abdominal distention, abdominal pain, anal bleeding, blood in stool, constipation, diarrhea, nausea and vomiting.       Objective     Vitals:    10/10/22 1002   BP: 128/64   Pulse: 77   Temp: 97.2 °F (36.2 °C)   SpO2: 99%         10/10/22  1002   Weight: 78 kg (172 lb)     Body mass index is 24.68 kg/m².    Physical Exam  Vitals reviewed.   Constitutional:       General: He  is not in acute distress.  Cardiovascular:      Rate and Rhythm: Normal rate and regular rhythm.      Heart sounds: Normal heart sounds.   Pulmonary:      Effort: Pulmonary effort is normal.      Breath sounds: Normal breath sounds.   Abdominal:      General: Bowel sounds are normal. There is no distension.      Palpations: Abdomen is soft.      Tenderness: There is no abdominal tenderness.   Skin:     General: Skin is warm and dry.   Neurological:      Mental Status: He is alert.         Imaging Results (Most Recent)     None          Assessment & Plan     Diagnoses and all orders for this visit:    1. Hx of colonic polyp (Primary)  -     Case Request; Standing  -     Implement Anesthesia Orders Day of Procedure; Standing  -     Obtain Informed Consent; Standing  -     Case Request    2. Family hx of colon cancer  -     Case Request; Standing  -     Implement Anesthesia Orders Day of Procedure; Standing  -     Obtain Informed Consent; Standing  -     Case Request    Other orders  -     Sodium Sulfate-Mag Sulfate-KCl (Sutab) 2023-081-326 MG tablet; Take 12 tablets by mouth Take As Directed.  Dispense: 24 tablet; Refill: 0    Plan for colonoscopy.                  COLONOSCOPY WITH ANESTHESIA (N/A)  All risks, benefits, alternatives, and indications of colonoscopy procedure have been discussed with the patient. Risks to include perforation of the colon requiring possible surgery or colostomy, risk of bleeding from biopsies or removal of colon tissue, possibility of missing a colon polyp or cancer, or adverse drug reaction.  Benefits to include the diagnosis and management of disease of the colon and rectum. Alternatives to include barium enema, radiographic evaluation, lab testing or no intervention. Pt verbalizes understanding and agrees.       MOODY Aponte

## 2022-10-24 ENCOUNTER — ANESTHESIA EVENT (OUTPATIENT)
Dept: GASTROENTEROLOGY | Facility: HOSPITAL | Age: 77
End: 2022-10-24

## 2022-10-24 ENCOUNTER — TELEPHONE (OUTPATIENT)
Dept: GASTROENTEROLOGY | Facility: CLINIC | Age: 77
End: 2022-10-24

## 2022-10-24 ENCOUNTER — HOSPITAL ENCOUNTER (OUTPATIENT)
Facility: HOSPITAL | Age: 77
Setting detail: HOSPITAL OUTPATIENT SURGERY
Discharge: HOME | End: 2022-10-24
Attending: INTERNAL MEDICINE | Admitting: INTERNAL MEDICINE

## 2022-10-24 ENCOUNTER — ANESTHESIA (OUTPATIENT)
Dept: GASTROENTEROLOGY | Facility: HOSPITAL | Age: 77
End: 2022-10-24

## 2022-10-24 VITALS
RESPIRATION RATE: 21 BRPM | TEMPERATURE: 97.2 F | BODY MASS INDEX: 23.77 KG/M2 | SYSTOLIC BLOOD PRESSURE: 100 MMHG | DIASTOLIC BLOOD PRESSURE: 79 MMHG | HEART RATE: 69 BPM | OXYGEN SATURATION: 96 % | WEIGHT: 166 LBS | HEIGHT: 70 IN

## 2022-10-24 DIAGNOSIS — Z86.010 HX OF COLONIC POLYP: ICD-10-CM

## 2022-10-24 DIAGNOSIS — Z80.0 FAMILY HX OF COLON CANCER: ICD-10-CM

## 2022-10-24 PROCEDURE — 25010000002 PROPOFOL 10 MG/ML EMULSION: Performed by: NURSE ANESTHETIST, CERTIFIED REGISTERED

## 2022-10-24 PROCEDURE — G0105 COLORECTAL SCRN; HI RISK IND: HCPCS | Performed by: INTERNAL MEDICINE

## 2022-10-24 RX ORDER — ONDANSETRON 2 MG/ML
4 INJECTION INTRAMUSCULAR; INTRAVENOUS ONCE AS NEEDED
Status: DISCONTINUED | OUTPATIENT
Start: 2022-10-24 | End: 2022-10-24 | Stop reason: HOSPADM

## 2022-10-24 RX ORDER — PROPOFOL 10 MG/ML
VIAL (ML) INTRAVENOUS AS NEEDED
Status: DISCONTINUED | OUTPATIENT
Start: 2022-10-24 | End: 2022-10-24 | Stop reason: SURG

## 2022-10-24 RX ORDER — LIDOCAINE HYDROCHLORIDE 10 MG/ML
0.5 INJECTION, SOLUTION EPIDURAL; INFILTRATION; INTRACAUDAL; PERINEURAL ONCE AS NEEDED
Status: DISCONTINUED | OUTPATIENT
Start: 2022-10-24 | End: 2022-10-24 | Stop reason: HOSPADM

## 2022-10-24 RX ORDER — SODIUM CHLORIDE 9 MG/ML
500 INJECTION, SOLUTION INTRAVENOUS CONTINUOUS PRN
Status: DISCONTINUED | OUTPATIENT
Start: 2022-10-24 | End: 2022-10-24 | Stop reason: HOSPADM

## 2022-10-24 RX ORDER — SODIUM CHLORIDE 0.9 % (FLUSH) 0.9 %
10 SYRINGE (ML) INJECTION AS NEEDED
Status: DISCONTINUED | OUTPATIENT
Start: 2022-10-24 | End: 2022-10-24 | Stop reason: HOSPADM

## 2022-10-24 RX ADMIN — SODIUM CHLORIDE 500 ML: 9 INJECTION, SOLUTION INTRAVENOUS at 10:30

## 2022-10-24 RX ADMIN — PROPOFOL 100 MG: 10 INJECTION, EMULSION INTRAVENOUS at 11:54

## 2022-10-24 RX ADMIN — PROPOFOL 100 MG: 10 INJECTION, EMULSION INTRAVENOUS at 12:00

## 2022-10-24 RX ADMIN — PROPOFOL 100 MG: 10 INJECTION, EMULSION INTRAVENOUS at 11:47

## 2022-10-24 NOTE — ANESTHESIA POSTPROCEDURE EVALUATION
Patient: Zackery Molina    Procedure Summary     Date: 10/24/22 Room / Location: [facility] ENDOSCOPY 6 / [facility] ENDOSCOPY    Anesthesia Start: 1144 Anesthesia Stop: 1207    Procedure: COLONOSCOPY WITH ANESTHESIA Diagnosis:       Hx of colonic polyp      Family hx of colon cancer      (Hx of colonic polyp [Z86.010])      (Family hx of colon cancer [Z80.0])    Surgeons: Donny Mock MD Provider: Jose Martin Ken CRNA    Anesthesia Type: MAC ASA Status: 3          Anesthesia Type: MAC    Vitals  No vitals data found for the desired time range.          Post Anesthesia Care and Evaluation    Patient location during evaluation: PHASE II  Patient participation: complete - patient participated  Level of consciousness: awake and alert  Pain score: 0  Pain management: adequate    Airway patency: patent  Anesthetic complications: No anesthetic complications  PONV Status: none  Cardiovascular status: acceptable and stable  Respiratory status: acceptable  Hydration status: acceptable

## 2022-10-24 NOTE — ANESTHESIA PREPROCEDURE EVALUATION
Anesthesia Evaluation     Patient summary reviewed   NPO Solid Status: > 8 hours             Airway   Mallampati: II  Dental      Pulmonary    (-) COPD, asthma, sleep apnea, not a smoker  Cardiovascular   Exercise tolerance: excellent (>7 METS)    (+) pacemaker (medtronic) pacemaker, hypertension, CAD, CABG (2013), hyperlipidemia,   (-) past MI, angina, cardiac stents      Neuro/Psych  (-) seizures, TIA, CVA  GI/Hepatic/Renal/Endo    (-) GERD, liver disease, no renal disease, diabetes    Musculoskeletal     Abdominal    Substance History      OB/GYN          Other                        Anesthesia Plan    ASA 3     MAC       Anesthetic plan, risks, benefits, and alternatives have been provided, discussed and informed consent has been obtained with: patient.        CODE STATUS:

## 2023-01-19 DIAGNOSIS — Z95.810 AICD (AUTOMATIC CARDIOVERTER/DEFIBRILLATOR) PRESENT: Primary | ICD-10-CM

## 2023-01-19 DIAGNOSIS — I25.5 ISCHEMIC CARDIOMYOPATHY: ICD-10-CM

## 2023-01-19 PROCEDURE — 93295 DEV INTERROG REMOTE 1/2/MLT: CPT | Performed by: CLINICAL NURSE SPECIALIST

## 2023-01-19 PROCEDURE — 93296 REM INTERROG EVL PM/IDS: CPT | Performed by: CLINICAL NURSE SPECIALIST

## 2023-02-24 ENCOUNTER — OFFICE VISIT (OUTPATIENT)
Dept: CARDIOLOGY CLINIC | Age: 78
End: 2023-02-24

## 2023-02-24 VITALS
SYSTOLIC BLOOD PRESSURE: 124 MMHG | OXYGEN SATURATION: 100 % | WEIGHT: 176 LBS | BODY MASS INDEX: 25.2 KG/M2 | HEART RATE: 72 BPM | DIASTOLIC BLOOD PRESSURE: 78 MMHG | HEIGHT: 70 IN

## 2023-02-24 DIAGNOSIS — Z95.810 AICD (AUTOMATIC CARDIOVERTER/DEFIBRILLATOR) PRESENT: ICD-10-CM

## 2023-02-24 DIAGNOSIS — I25.10 CORONARY ARTERY DISEASE INVOLVING NATIVE CORONARY ARTERY OF NATIVE HEART WITHOUT ANGINA PECTORIS: Primary | ICD-10-CM

## 2023-02-24 DIAGNOSIS — I25.5 ISCHEMIC CARDIOMYOPATHY: ICD-10-CM

## 2023-02-24 DIAGNOSIS — I10 ESSENTIAL HYPERTENSION: ICD-10-CM

## 2023-02-24 RX ORDER — NITROGLYCERIN 0.4 MG/1
0.4 TABLET SUBLINGUAL EVERY 5 MIN PRN
Qty: 25 TABLET | Refills: 3 | Status: SHIPPED | OUTPATIENT
Start: 2023-02-24

## 2023-02-24 NOTE — PROGRESS NOTES
[facility] Cardiology  [facility] [address]  [address]  Phone: [phone]  Fax: [phone]    OFFICE VISIT:  2023    Nestor Sterling - : 1945    Reason For Visit:  Sydney Rivera is a 78 y.o. male who is here for 6 Month Follow-Up (ICD  no cardiac symptoms)  CABG ×4 in -nuclear stress test 16 showed old inferior infarction with no new ischemia with EF 35%  EF 20%. AICD placement   With generator replacement 21     He is here today for follow-up. He states there is been no change in activity tolerance. He has no cardiac complaints. At the gym a few times a week and walks. Has not noticed any significant change in activity tolerance. Yobany Dwyer denies exertional chest pain, shortness of breath, orthopnea, paroxysmal nocturnal dyspnea, syncope, presyncope, arrhythmia, edema and fatigue. The patient denies numbness or weakness to suggest cerebrovascular accident or transient ischemic attack. Meme Hines MD is PCP and follows labs.   Nestor Sterling has the following history as recorded in [facility]:    Patient Active Problem List    Diagnosis Date Noted    ICD (implantable cardioverter-defibrillator) battery depletion     Hx of CABG 10/03/2019    Pancreatitis, unspecified pancreatitis type 2020    Nausea & vomiting 2020    Idiopathic pancreatitis 2016    AICD (automatic cardioverter/defibrillator) present     Ischemic cardiomyopathy     Hyperlipidemia 2011    HTN (hypertension) 2011    CAD (coronary artery disease)      Past Medical History:   Diagnosis Date    Additional heart attack (inferior wall)     AICD (automatic cardioverter/defibrillator) present 13  MDL    Arthritis     CAD (coronary artery disease)     SEVERE MULTIVESSEL    Hx of blood clots     Post op hip surgery    Hyperlipidemia     PCP manages     Hypertension     Ischemic cardiomyopathy     SEVERE    Pancreatitis     Prostate CA ([address]      Past Surgical History:   Procedure Laterality Date    APPENDECTOMY      CARDIAC CATHETERIZATION  11    DR Demond Nance    CARDIAC CATHETERIZATION  2013  MDL    EF 20%    CARDIAC DEFIBRILLATOR PLACEMENT  13    CARDIAC PACEMAKER PLACEMENT      CHOLECYSTECTOMY  2016    COLONOSCOPY      CORONARY ARTERY BYPASS GRAFT  11    PACABG X 4, LIMA-DIAG, SVG-PLM, SVG-LAD, SVG-OM, RT LEG EVH, DR MYLES    HIP SURGERY      Growth in hip removed    KNEE ARTHROSCOPY      PACEMAKER PLACEMENT      aicd    PROSTATE SURGERY      PROSTATE SURGERY  2020     Family History   Problem Relation Age of Onset    Diabetes Mother     Cancer Sister         Pancreatic     Social History     Tobacco Use    Smoking status: Former     Types: Cigarettes     Quit date:      Years since quittin.1    Smokeless tobacco: Never   Substance Use Topics    Alcohol use: No      Current Outpatient Medications   Medication Sig Dispense Refill    nitroGLYCERIN (NITROSTAT) 0.4 MG SL tablet Place 1 tablet under the tongue every 5 minutes as needed for Chest pain 25 tablet 3    isosorbide mononitrate (IMDUR) 30 MG extended release tablet Take 1 tablet by mouth daily 90 tablet 3    carvedilol (COREG) 6.25 MG tablet TAKE 1/2 TABLET TWICE DAILY 90 tablet 3    rosuvastatin (CRESTOR) 10 MG tablet Take 10 mg by mouth daily       ondansetron (ZOFRAN) 4 MG tablet Take 1 tablet by mouth 3 times daily as needed for Nausea or Vomiting 15 tablet 0    aspirin 81 MG EC tablet Take 81 mg by mouth daily. No current facility-administered medications for this visit. Allergies: Patient has no known allergies. Review of Systems  Constitutional - no significant activity change, appetite change, or unexpected weight change. No fever, chills or diaphoresis. No fatigue. HEENT - no significant rhinorrhea or epistaxis. No tinnitus or significant hearing loss. Eyes - no sudden vision change or amaurosis.    Respiratory - no significant wheezing, stridor, apnea or cough. No dyspnea on exertion or shortness of breath. Cardiovascular - no exertional chest pain, orthopnea or PND. No sensation of arrhythmia or slow heart rate. No claudication or leg edema. Gastrointestinal - no abdominal swelling or pain. No blood in stool. No severe constipation, diarrhea, nausea, or vomiting. Genitourinary - no difficulty urinating, dysuria, frequency, or urgency. No flank pain or hematuria. Musculoskeletal - no back pain, gait disturbance, or myalgia. Skin - no color change or rash. No pallor. No new surgical incision. Neurologic - no speech difficulty, facial asymmetry or lateralizing weakness. No seizures, presyncope, syncope, or significant dizziness. Hematologic - no easy bruising or excessive bleeding. Psychiatric - no severe anxiety or insomnia. No confusion. All other review of systems are negative. Objective  Vital Signs - /78   Pulse 72   Ht 5' 10\" (1.778 m)   Wt 176 lb (79.8 kg)   SpO2 100%   BMI 25.25 kg/m²   General - Junito Payne is alert, cooperative, and pleasant. Well groomed. No acute distress. Body habitus is normal.  HEENT - The head is normocephalic. No circumoral cyanosis. Dentition is normal.   EYES -  No Xanthelasma, no arcus senilis, no conjunctival hemorrhages or discharge. Neck - Supple, without increased jugular venous pressures. No carotid bruits. No mass. Respiratory - Lungs are clear bilaterally. No wheezes or rales. Normal effort without use of accessory muscles. Cardiovascular - Heart has regular rhythm and rate. No murmurs, rubs or gallops. + pedal pulses and no varicosities. Abdominal -  Soft, nontender, nondistended. Bowel sounds are intact. Extremities - No clubbing, cyanosis, or  edema. Musculoskeletal -  No clubbing . No Osler's nodes. Gait normal .  No kyphosis or scoliosis. Skin -  no statis ulcers or dermatitis. Neurological - No focal signs are identified.   Oriented to person, place and time. Psychiatric -  Appropriate affect and mood. Assessment:     Diagnosis Orders   1. Coronary artery disease involving native coronary artery of native heart without angina pectoris        2. AICD (automatic cardioverter/defibrillator) present        3. Ischemic cardiomyopathy        4. Essential hypertension          Data:  BP Readings from Last 3 Encounters:   02/24/23 124/78   08/24/22 122/78   02/08/22 118/72    Pulse Readings from Last 3 Encounters:   02/24/23 72   08/24/22 71   02/08/22 61        Wt Readings from Last 3 Encounters:   02/24/23 176 lb (79.8 kg)   08/24/22 167 lb (75.8 kg)   02/08/22 176 lb (79.8 kg)   ICD interrogation showed adequate battery voltage   (10.5 years) and charge time with  normal diagnostics, no firings and no sustained dysrhythmias. Optivol stable. Mode VVI at 40  Recheck in 3 mos. Via [employer] Interactive monitoring system    Blood pressure and heart rate well controlled. Medical management includes beta-blocker, long-acting nitrate, aspirin and statin    PCP is managing his labs. He has good exercise tolerance. We discussed signs and symptoms to report. States taking medications as prescribed  Stable cardiovascular status. No evidence of overt heart failure, angina or dysrhythmia. 20 minutes were spent preparing, reviewing and seeing patient. All questions answered    Plan    Maintain good blood pressure control-goal<130/80 at rest  Maintain good cholesterol control LDL goal<70 with arterial disease  If you are diabetic work to keep/obtain hemoglobin A1c< 7    Follow up in 6 mos  With ICD check   Call with any questions or concerns  Follow up with Vic Schmidt MD for non cardiac problems  Report any new problems  Cardiovascular Fitness-Exercise as tolerated. Strive for 30 minutes of exercise most days of the week.     Cardiac / Healthy Diet- Avoid processed high fat foods, maintain low sodium/salt   Continue current medications as directed  Continue plan of treatment  It is always recommended that you bring your medications bottles with you to each visit - this is for your safety!       JONATHAN Bond    EMR dragon/transcription disclaimer: Much of this encounter note is electronic transcription/translation of spoken language to printed tach. Electronic translation of spoken language may be erroneous, or at times, nonsensical words or phrases may be inadvertently transcribed. Although, I have reviewed the note for such errors, some may still exist.

## 2023-02-24 NOTE — PATIENT INSTRUCTIONS
Maintain good blood pressure control-goal<130/80 at rest  Maintain good cholesterol control LDL goal<70 with arterial disease  If you are diabetic work to keep/obtain hemoglobin A1c< 7    Follow up in 6 mos  With ICD check   Call with any questions or concerns  Follow up with Bhupinder Torres MD for non cardiac problems  Report any new problems  Cardiovascular Fitness-Exercise as tolerated. Strive for 30 minutes of exercise most days of the week. Cardiac / Healthy Diet- Avoid processed high fat foods, maintain low sodium/salt   Continue current medications as directed  Continue plan of treatment  It is always recommended that you bring your medications bottles with you to each visit - this is for your safety!

## 2023-03-14 ENCOUNTER — LAB (OUTPATIENT)
Dept: LAB | Facility: HOSPITAL | Age: 78
End: 2023-03-14
Payer: MEDICARE

## 2023-03-14 DIAGNOSIS — C61 PROSTATE CANCER: ICD-10-CM

## 2023-03-14 PROCEDURE — 36415 COLL VENOUS BLD VENIPUNCTURE: CPT

## 2023-03-14 PROCEDURE — 84153 ASSAY OF PSA TOTAL: CPT | Performed by: UROLOGY

## 2023-03-15 LAB — PSA SERPL-MCNC: <0.014 NG/ML (ref 0–4)

## 2023-03-22 DIAGNOSIS — C61 PROSTATE CANCER: Primary | ICD-10-CM

## 2023-03-30 RX ORDER — CARVEDILOL 6.25 MG/1
TABLET ORAL
Qty: 90 TABLET | Refills: 3 | Status: SHIPPED | OUTPATIENT
Start: 2023-03-30

## 2023-06-28 ENCOUNTER — TELEPHONE (OUTPATIENT)
Dept: CARDIOLOGY CLINIC | Age: 78
End: 2023-06-28

## 2023-07-14 PROCEDURE — 93295 DEV INTERROG REMOTE 1/2/MLT: CPT | Performed by: CLINICAL NURSE SPECIALIST

## 2023-07-14 PROCEDURE — 93296 REM INTERROG EVL PM/IDS: CPT | Performed by: CLINICAL NURSE SPECIALIST

## 2023-07-17 DIAGNOSIS — I25.5 ISCHEMIC CARDIOMYOPATHY: ICD-10-CM

## 2023-07-17 DIAGNOSIS — Z95.810 AICD (AUTOMATIC CARDIOVERTER/DEFIBRILLATOR) PRESENT: Primary | ICD-10-CM

## 2023-08-04 DIAGNOSIS — I25.10 CORONARY ARTERY DISEASE INVOLVING NATIVE CORONARY ARTERY OF NATIVE HEART WITHOUT ANGINA PECTORIS: ICD-10-CM

## 2023-08-04 RX ORDER — ISOSORBIDE MONONITRATE 30 MG/1
TABLET, EXTENDED RELEASE ORAL
Qty: 90 TABLET | Refills: 3 | Status: SHIPPED | OUTPATIENT
Start: 2023-08-04

## 2023-08-31 ENCOUNTER — OFFICE VISIT (OUTPATIENT)
Dept: CARDIOLOGY CLINIC | Age: 78
End: 2023-08-31
Payer: MEDICARE

## 2023-08-31 VITALS
WEIGHT: 169 LBS | BODY MASS INDEX: 24.2 KG/M2 | DIASTOLIC BLOOD PRESSURE: 82 MMHG | HEART RATE: 58 BPM | HEIGHT: 70 IN | SYSTOLIC BLOOD PRESSURE: 122 MMHG

## 2023-08-31 DIAGNOSIS — Z95.810 AICD (AUTOMATIC CARDIOVERTER/DEFIBRILLATOR) PRESENT: ICD-10-CM

## 2023-08-31 DIAGNOSIS — I25.5 ISCHEMIC CARDIOMYOPATHY: ICD-10-CM

## 2023-08-31 DIAGNOSIS — I10 ESSENTIAL HYPERTENSION: ICD-10-CM

## 2023-08-31 DIAGNOSIS — I25.10 CORONARY ARTERY DISEASE INVOLVING NATIVE CORONARY ARTERY OF NATIVE HEART WITHOUT ANGINA PECTORIS: Primary | ICD-10-CM

## 2023-08-31 PROCEDURE — 1123F ACP DISCUSS/DSCN MKR DOCD: CPT | Performed by: CLINICAL NURSE SPECIALIST

## 2023-08-31 PROCEDURE — 3074F SYST BP LT 130 MM HG: CPT | Performed by: CLINICAL NURSE SPECIALIST

## 2023-08-31 PROCEDURE — 93282 PRGRMG EVAL IMPLANTABLE DFB: CPT | Performed by: CLINICAL NURSE SPECIALIST

## 2023-08-31 PROCEDURE — 1036F TOBACCO NON-USER: CPT | Performed by: CLINICAL NURSE SPECIALIST

## 2023-08-31 PROCEDURE — 99213 OFFICE O/P EST LOW 20 MIN: CPT | Performed by: CLINICAL NURSE SPECIALIST

## 2023-08-31 PROCEDURE — G8427 DOCREV CUR MEDS BY ELIG CLIN: HCPCS | Performed by: CLINICAL NURSE SPECIALIST

## 2023-08-31 PROCEDURE — G8420 CALC BMI NORM PARAMETERS: HCPCS | Performed by: CLINICAL NURSE SPECIALIST

## 2023-08-31 PROCEDURE — 3079F DIAST BP 80-89 MM HG: CPT | Performed by: CLINICAL NURSE SPECIALIST

## 2023-08-31 RX ORDER — NITROGLYCERIN 0.4 MG/1
0.4 TABLET SUBLINGUAL EVERY 5 MIN PRN
Qty: 25 TABLET | Refills: 3 | Status: SHIPPED | OUTPATIENT
Start: 2023-08-31

## 2023-08-31 NOTE — PATIENT INSTRUCTIONS
Maintain good blood pressure control-goal<130/80 at rest  Maintain good cholesterol control LDL goal<70 with arterial disease  If you are diabetic work to keep/obtain hemoglobin A1c< 7    Follow up in 6 mos  With ICD check   Call with any questions or concerns  Follow up with Radha Loera MD for non cardiac problems  Report any new problems  Cardiovascular Fitness-Exercise as tolerated. Strive for 30 minutes of exercise most days of the week. Cardiac / Healthy Diet- Avoid processed high fat foods, maintain low sodium/salt   Continue current medications as directed  Continue plan of treatment  It is always recommended that you bring your medications bottles with you to each visit - this is for your safety!

## 2023-12-04 DIAGNOSIS — Z95.810 AICD (AUTOMATIC CARDIOVERTER/DEFIBRILLATOR) PRESENT: Primary | ICD-10-CM

## 2023-12-04 DIAGNOSIS — I25.5 ISCHEMIC CARDIOMYOPATHY: ICD-10-CM

## 2023-12-04 PROCEDURE — 93295 DEV INTERROG REMOTE 1/2/MLT: CPT | Performed by: CLINICAL NURSE SPECIALIST

## 2023-12-04 PROCEDURE — 93296 REM INTERROG EVL PM/IDS: CPT | Performed by: CLINICAL NURSE SPECIALIST

## 2024-02-29 ENCOUNTER — OFFICE VISIT (OUTPATIENT)
Dept: CARDIOLOGY CLINIC | Age: 79
End: 2024-02-29
Payer: MEDICARE

## 2024-02-29 VITALS
WEIGHT: 170 LBS | SYSTOLIC BLOOD PRESSURE: 120 MMHG | BODY MASS INDEX: 24.34 KG/M2 | HEIGHT: 70 IN | HEART RATE: 70 BPM | DIASTOLIC BLOOD PRESSURE: 68 MMHG

## 2024-02-29 DIAGNOSIS — I25.5 ISCHEMIC CARDIOMYOPATHY: ICD-10-CM

## 2024-02-29 DIAGNOSIS — Z95.810 AICD (AUTOMATIC CARDIOVERTER/DEFIBRILLATOR) PRESENT: ICD-10-CM

## 2024-02-29 DIAGNOSIS — I10 ESSENTIAL HYPERTENSION: ICD-10-CM

## 2024-02-29 DIAGNOSIS — I25.10 CORONARY ARTERY DISEASE INVOLVING NATIVE CORONARY ARTERY OF NATIVE HEART WITHOUT ANGINA PECTORIS: Primary | ICD-10-CM

## 2024-02-29 PROCEDURE — 1123F ACP DISCUSS/DSCN MKR DOCD: CPT | Performed by: CLINICAL NURSE SPECIALIST

## 2024-02-29 PROCEDURE — 1036F TOBACCO NON-USER: CPT | Performed by: CLINICAL NURSE SPECIALIST

## 2024-02-29 PROCEDURE — 3074F SYST BP LT 130 MM HG: CPT | Performed by: CLINICAL NURSE SPECIALIST

## 2024-02-29 PROCEDURE — 93282 PRGRMG EVAL IMPLANTABLE DFB: CPT | Performed by: CLINICAL NURSE SPECIALIST

## 2024-02-29 PROCEDURE — G8420 CALC BMI NORM PARAMETERS: HCPCS | Performed by: CLINICAL NURSE SPECIALIST

## 2024-02-29 PROCEDURE — 3078F DIAST BP <80 MM HG: CPT | Performed by: CLINICAL NURSE SPECIALIST

## 2024-02-29 PROCEDURE — G8484 FLU IMMUNIZE NO ADMIN: HCPCS | Performed by: CLINICAL NURSE SPECIALIST

## 2024-02-29 PROCEDURE — G8427 DOCREV CUR MEDS BY ELIG CLIN: HCPCS | Performed by: CLINICAL NURSE SPECIALIST

## 2024-02-29 PROCEDURE — 99214 OFFICE O/P EST MOD 30 MIN: CPT | Performed by: CLINICAL NURSE SPECIALIST

## 2024-02-29 RX ORDER — LANOLIN ALCOHOL/MO/W.PET/CERES
3 CREAM (GRAM) TOPICAL NIGHTLY PRN
COMMUNITY

## 2024-02-29 RX ORDER — TRAZODONE HYDROCHLORIDE 50 MG/1
50 TABLET ORAL NIGHTLY
COMMUNITY

## 2024-02-29 NOTE — PROGRESS NOTES
[facility] Cardiology  [address] [address]  Phone: [phone]  Fax: [phone]    OFFICE VISIT:  2024    Felix Pimentel - : 1945    Reason For Visit:  Felix is a 78 y.o. male who is here for Coronary Artery Disease, Hypertension, and Cardiomyopathy (6 month follow up and ICD recheck, patient denies any cardiac symptoms. )  CABG ×4 in -nuclear stress test 16 showed old inferior infarction with no new ischemia with EF 35%  EF 20%. AICD placement   With generator replacement 21     He returns today in follow-up.  He states overall he is doing fairly well.  He did have 1 episode of being very upset and under significant amount of stress and developed pain in bilateral upper arms.  Lasted for a few minutes.  Relieved with rest and after 10 minutes stressful situation resolved and the pain resolved.  Did not take any nitroglycerin.  He has not had any further symptoms.      Subjective  Felix denies exertional chest pain, shortness of breath, orthopnea, paroxysmal nocturnal dyspnea, syncope, presyncope, arrhythmia, edema and fatigue.  The patient denies numbness or weakness to suggest cerebrovascular accident or transient ischemic attack.    Brian King MD is PCP and follows labs.  Felix Pimentel has the following history as recorded in [facility]:    Patient Active Problem List    Diagnosis Date Noted    ICD (implantable cardioverter-defibrillator) battery depletion     Hx of CABG 10/03/2019    Pancreatitis, unspecified pancreatitis type 2020    Nausea & vomiting 2020    Idiopathic pancreatitis 2016    AICD (automatic cardioverter/defibrillator) present     Ischemic cardiomyopathy     Hyperlipidemia 2011    HTN (hypertension) 2011    CAD (coronary artery disease)      Past Medical History:   Diagnosis Date    Additional heart attack (inferior wall)     AICD (automatic cardioverter/defibrillator) present 13  MDL

## 2024-02-29 NOTE — PATIENT INSTRUCTIONS
ICD will send in 3 mos    Lexiscan soon - if that is OK then let us know if you have any more chest or arm pain     Maintain good blood pressure control-goal<130/80 at rest  Maintain good cholesterol control LDL goal<70 with arterial disease  If you are diabetic work to keep/obtain hemoglobin A1c< 7    Follow up in 6 mos  With ICD check   Call with any questions or concerns  Follow up with Brian King MD for non cardiac problems  Report any new problems  Cardiovascular Fitness-Exercise as tolerated.  Strive for 30 minutes of exercise most days of the week.    Cardiac / Healthy Diet- Avoid processed high fat foods, maintain low sodium/salt   Continue current medications as directed  Continue plan of treatment  It is always recommended that you bring your medications bottles with you to each visit - this is for your safety!

## 2024-03-20 ENCOUNTER — HOSPITAL ENCOUNTER (OUTPATIENT)
Dept: NUCLEAR MEDICINE | Age: 79
Discharge: HOME | End: 2024-03-22
Payer: MEDICARE

## 2024-03-20 DIAGNOSIS — I25.5 ISCHEMIC CARDIOMYOPATHY: ICD-10-CM

## 2024-03-20 DIAGNOSIS — I25.10 CORONARY ARTERY DISEASE INVOLVING NATIVE CORONARY ARTERY OF NATIVE HEART WITHOUT ANGINA PECTORIS: ICD-10-CM

## 2024-03-20 PROCEDURE — 78452 HT MUSCLE IMAGE SPECT MULT: CPT

## 2024-03-20 PROCEDURE — 93017 CV STRESS TEST TRACING ONLY: CPT

## 2024-03-20 PROCEDURE — 6360000002 HC RX W HCPCS: Performed by: CLINICAL NURSE SPECIALIST

## 2024-03-20 PROCEDURE — 3430000000 HC RX DIAGNOSTIC RADIOPHARMACEUTICAL: Performed by: CLINICAL NURSE SPECIALIST

## 2024-03-20 PROCEDURE — A9502 TC99M TETROFOSMIN: HCPCS | Performed by: CLINICAL NURSE SPECIALIST

## 2024-03-20 RX ORDER — REGADENOSON 0.08 MG/ML
0.4 INJECTION, SOLUTION INTRAVENOUS
Status: COMPLETED | OUTPATIENT
Start: 2024-03-20 | End: 2024-03-20

## 2024-03-20 RX ADMIN — TETROFOSMIN 8 MILLICURIE: 1.38 INJECTION, POWDER, LYOPHILIZED, FOR SOLUTION INTRAVENOUS at 14:12

## 2024-03-20 RX ADMIN — REGADENOSON 0.4 MG: 0.08 INJECTION, SOLUTION INTRAVENOUS at 08:40

## 2024-03-20 RX ADMIN — TETROFOSMIN 24 MILLICURIE: 1.38 INJECTION, POWDER, LYOPHILIZED, FOR SOLUTION INTRAVENOUS at 14:12

## 2024-03-26 ENCOUNTER — TELEPHONE (OUTPATIENT)
Dept: CARDIOLOGY CLINIC | Age: 79
End: 2024-03-26

## 2024-03-26 NOTE — TELEPHONE ENCOUNTER
----- Message from JONATHAN Payne sent at 3/26/2024  8:40 AM CDT -----  Ejection fraction stable.  No evidence of new ischemia  Continue same medications

## 2024-03-30 ENCOUNTER — APPOINTMENT (OUTPATIENT)
Dept: GENERAL RADIOLOGY | Age: 79
End: 2024-03-30
Payer: COMMERCIAL

## 2024-03-30 ENCOUNTER — APPOINTMENT (OUTPATIENT)
Dept: CT IMAGING | Age: 79
End: 2024-03-30
Payer: COMMERCIAL

## 2024-03-30 ENCOUNTER — HOSPITAL ENCOUNTER (EMERGENCY)
Age: 79
Discharge: HOME | End: 2024-03-30
Attending: EMERGENCY MEDICINE
Payer: COMMERCIAL

## 2024-03-30 VITALS
TEMPERATURE: 98.5 F | HEART RATE: 68 BPM | SYSTOLIC BLOOD PRESSURE: 118 MMHG | OXYGEN SATURATION: 98 % | DIASTOLIC BLOOD PRESSURE: 61 MMHG | RESPIRATION RATE: 15 BRPM

## 2024-03-30 DIAGNOSIS — T14.8XXA ABRASION: ICD-10-CM

## 2024-03-30 DIAGNOSIS — M25.551 RIGHT HIP PAIN: ICD-10-CM
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
## 2024-03-30 DIAGNOSIS — M25.511 ACUTE PAIN OF RIGHT SHOULDER: ICD-10-CM

## 2024-03-30 LAB
ALBUMIN SERPL-MCNC: 4.2 G/DL (ref 3.5–5.2)
ALP SERPL-CCNC: 93 U/L (ref 40–130)
ALT SERPL-CCNC: 20 U/L (ref 5–41)
ANION GAP SERPL CALCULATED.3IONS-SCNC: 10 MMOL/L (ref 7–19)
AST SERPL-CCNC: 20 U/L (ref 5–40)
BASOPHILS # BLD: 0 K/UL (ref 0–0.2)
BASOPHILS NFR BLD: 0.6 % (ref 0–1)
BILIRUB SERPL-MCNC: 0.4 MG/DL (ref 0.2–1.2)
BUN SERPL-MCNC: 16 MG/DL (ref 8–23)
CALCIUM SERPL-MCNC: 8.7 MG/DL (ref 8.8–10.2)
CHLORIDE SERPL-SCNC: 105 MMOL/L (ref 98–111)
CO2 SERPL-SCNC: 26 MMOL/L (ref 22–29)
CREAT SERPL-MCNC: 1.1 MG/DL (ref 0.5–1.2)
EOSINOPHIL # BLD: 0.2 K/UL (ref 0–0.6)
EOSINOPHIL NFR BLD: 2.1 % (ref 0–5)
ERYTHROCYTE [DISTWIDTH] IN BLOOD BY AUTOMATED COUNT: 12.2 % (ref 11.5–14.5)
GLUCOSE SERPL-MCNC: 189 MG/DL (ref 74–109)
HCT VFR BLD AUTO: 37.3 % (ref 42–52)
HGB BLD-MCNC: 12.3 G/DL (ref 14–18)
IMM GRANULOCYTES # BLD: 0.1 K/UL
LIPASE SERPL-CCNC: 11 U/L (ref 13–60)
LYMPHOCYTES # BLD: 2.5 K/UL (ref 1.1–4.5)
LYMPHOCYTES NFR BLD: 35.7 % (ref 20–40)
MCH RBC QN AUTO: 30.1 PG (ref 27–31)
MCHC RBC AUTO-ENTMCNC: 33 G/DL (ref 33–37)
MCV RBC AUTO: 91.2 FL (ref 80–94)
MONOCYTES # BLD: 0.6 K/UL (ref 0–0.9)
MONOCYTES NFR BLD: 7.9 % (ref 0–10)
NEUTROPHILS # BLD: 3.7 K/UL (ref 1.5–7.5)
NEUTS SEG NFR BLD: 53 % (ref 50–65)
PLATELET # BLD AUTO: 206 K/UL (ref 130–400)
PMV BLD AUTO: 10.7 FL (ref 9.4–12.4)
POTASSIUM SERPL-SCNC: 4.3 MMOL/L (ref 3.5–5)
PROT SERPL-MCNC: 6.2 G/DL (ref 6.6–8.7)
RBC # BLD AUTO: 4.09 M/UL (ref 4.7–6.1)
SODIUM SERPL-SCNC: 141 MMOL/L (ref 136–145)
WBC # BLD AUTO: 7.1 K/UL (ref 4.8–10.8)

## 2024-03-30 PROCEDURE — 36415 COLL VENOUS BLD VENIPUNCTURE: CPT

## 2024-03-30 PROCEDURE — 85025 COMPLETE CBC W/AUTO DIFF WBC: CPT

## 2024-03-30 PROCEDURE — 73030 X-RAY EXAM OF SHOULDER: CPT

## 2024-03-30 PROCEDURE — 83690 ASSAY OF LIPASE: CPT

## 2024-03-30 PROCEDURE — 99284 EMERGENCY DEPT VISIT MOD MDM: CPT

## 2024-03-30 PROCEDURE — 80053 COMPREHEN METABOLIC PANEL: CPT

## 2024-03-30 PROCEDURE — 73502 X-RAY EXAM HIP UNI 2-3 VIEWS: CPT

## 2024-03-30 RX ORDER — MORPHINE SULFATE 4 MG/ML
4 INJECTION, SOLUTION INTRAMUSCULAR; INTRAVENOUS ONCE
Status: DISCONTINUED | OUTPATIENT
Start: 2024-03-30 | End: 2024-03-30 | Stop reason: HOSPADM

## 2024-03-30 RX ORDER — ONDANSETRON 2 MG/ML
4 INJECTION INTRAMUSCULAR; INTRAVENOUS ONCE
Status: DISCONTINUED | OUTPATIENT
Start: 2024-03-30 | End: 2024-03-30 | Stop reason: HOSPADM

## 2024-03-30 RX ORDER — CYCLOBENZAPRINE HCL 10 MG
10 TABLET ORAL 3 TIMES DAILY PRN
Qty: 30 TABLET | Refills: 0 | Status: SHIPPED | OUTPATIENT
Start: 2024-03-30 | End: 2024-04-09

## 2024-03-30 RX ORDER — NAPROXEN 500 MG/1
500 TABLET ORAL 2 TIMES DAILY
Qty: 15 TABLET | Refills: 0 | Status: SHIPPED | OUTPATIENT
Start: 2024-03-30

## 2024-03-30 ASSESSMENT — ENCOUNTER SYMPTOMS
DIARRHEA: 0
SORE THROAT: 0
VOMITING: 0
BACK PAIN: 0
ABDOMINAL PAIN: 0
NAUSEA: 0
SHORTNESS OF BREATH: 0
RHINORRHEA: 0
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
## 2024-05-09 RX ORDER — CARVEDILOL 6.25 MG/1
TABLET ORAL
Qty: 90 TABLET | Refills: 3 | Status: SHIPPED | OUTPATIENT
Start: 2024-05-09

## 2024-05-30 ENCOUNTER — TELEPHONE (OUTPATIENT)
Dept: CARDIOLOGY CLINIC | Age: 79
End: 2024-05-30

## 2024-06-12 ENCOUNTER — TELEPHONE (OUTPATIENT)
Dept: CARDIOLOGY CLINIC | Age: 79
End: 2024-06-12

## 2024-07-11 ENCOUNTER — TELEPHONE (OUTPATIENT)
Dept: CARDIOLOGY CLINIC | Age: 79
End: 2024-07-11

## 2024-07-11 NOTE — TELEPHONE ENCOUNTER
Patient returned call and left voice message stating his monitor has not been working.  He contacted [employer].  They are sending him a new monitor.  He will send [employer] once he receives his new monitor.

## 2024-07-24 DIAGNOSIS — Z95.810 AICD (AUTOMATIC CARDIOVERTER/DEFIBRILLATOR) PRESENT: Primary | ICD-10-CM

## 2024-07-24 DIAGNOSIS — I25.5 ISCHEMIC CARDIOMYOPATHY: ICD-10-CM

## 2024-07-24 PROCEDURE — 93296 REM INTERROG EVL PM/IDS: CPT | Performed by: CLINICAL NURSE SPECIALIST

## 2024-07-24 PROCEDURE — 93295 DEV INTERROG REMOTE 1/2/MLT: CPT | Performed by: CLINICAL NURSE SPECIALIST

## 2024-08-29 ENCOUNTER — OFFICE VISIT (OUTPATIENT)
Dept: CARDIOLOGY CLINIC | Age: 79
End: 2024-08-29

## 2024-08-29 VITALS
DIASTOLIC BLOOD PRESSURE: 80 MMHG | HEART RATE: 64 BPM | HEIGHT: 70 IN | BODY MASS INDEX: 23.77 KG/M2 | SYSTOLIC BLOOD PRESSURE: 136 MMHG | WEIGHT: 166 LBS

## 2024-08-29 DIAGNOSIS — I25.5 ISCHEMIC CARDIOMYOPATHY: ICD-10-CM

## 2024-08-29 DIAGNOSIS — I25.10 CORONARY ARTERY DISEASE INVOLVING NATIVE CORONARY ARTERY OF NATIVE HEART WITHOUT ANGINA PECTORIS: Primary | ICD-10-CM

## 2024-08-29 DIAGNOSIS — I10 ESSENTIAL HYPERTENSION: ICD-10-CM

## 2024-08-29 DIAGNOSIS — Z95.810 AICD (AUTOMATIC CARDIOVERTER/DEFIBRILLATOR) PRESENT: ICD-10-CM

## 2024-08-29 NOTE — PROGRESS NOTES
[facility] Cardiology  [address] [address]  Phone: [phone]  Fax: [phone]    OFFICE VISIT:  2024    Felix Pimentel - : 1945    Reason For Visit:  Felix is a 79 y.o. male who is here for Follow-up (No cardiac symptoms) and Coronary artery disease involving native coronary artery of  CABG ×4 in -nuclear stress test 16 showed old inferior infarction with no new ischemia with EF 35%  EF 20%. AICD placement   With generator replacement 21    Routine follow-up in February with complaints of 1 episode of chest discomfort.  Underwent nuclear stress test that showed stable ejection fraction and no evidence of any new ischemia.     He returns today in follow-up.  States overall he is active and doing well      Subjective  Felix denies exertional chest pain, shortness of breath, orthopnea, paroxysmal nocturnal dyspnea, syncope, presyncope, arrhythmia, edema and fatigue.  The patient denies numbness or weakness to suggest cerebrovascular accident or transient ischemic attack.    Brian King MD is PCP and follows labs.  Felix Pimentel [facility] the [facility]:    Patient Active Problem List    Diagnosis Date Noted    ICD (implantable cardioverter-defibrillator) battery depletion     Hx of CABG 10/03/2019    Pancreatitis, unspecified pancreatitis type 2020    Nausea & vomiting 2020    Idiopathic pancreatitis 2016    AICD (automatic cardioverter/defibrillator) present     Ischemic cardiomyopathy     Hyperlipidemia 2011    HTN (hypertension) 2011    CAD (coronary artery disease)      Past Medical History:   Diagnosis Date    Additional heart attack (inferior wall)     AICD (automatic cardioverter/defibrillator) present 13  MDL    Arthritis     CAD (coronary artery disease)     SEVERE MULTIVESSEL    Hx of blood clots     Post op hip surgery    Hyperlipidemia     PCP manages     Hypertension   epistaxis. No tinnitus or significant hearing loss.   Eyes - no sudden vision change or amaurosis.   Respiratory - no significant wheezing, stridor, apnea or cough.  No dyspnea on exertion or shortness of breath.  Cardiovascular - no exertional chest pain, orthopnea or PND.  No sensation of arrhythmia or slow heart rate.   No claudication or leg edema.  Gastrointestinal - no abdominal swelling or pain. No blood in stool. No severe constipation, diarrhea, nausea, or vomiting.   Genitourinary - no difficulty urinating, dysuria, frequency, or urgency. No flank pain or hematuria.   Musculoskeletal - no back pain, gait disturbance, or myalgia.   Skin - no color change or rash.  No pallor.  No new surgical incision.  Neurologic - no speech difficulty, facial asymmetry or lateralizing weakness.  No seizures, presyncope, syncope, or significant dizziness.  Hematologic - no easy bruising or excessive bleeding.   Psychiatric - no severe anxiety or insomnia.  No confusion.   All other review of systems are negative.      Objective  Vital Signs - /80   Pulse 64   Ht 1.778 m (5' 10\")   Wt 75.3 kg (166 lb)   BMI 23.82 kg/m²   General - Felix is alert, cooperative, and pleasant.  Well groomed.  No acute distress.    Body habitus is normal.  HEENT - The head is normocephalic. No circumoral cyanosis.  Dentition is normal.   EYES -  No Xanthelasma, no arcus senilis, no conjunctival hemorrhages or discharge.   Neck - Supple, without increased jugular venous pressures.  No carotid bruits.  No mass.   Respiratory - Lungs are clear bilaterally.  No wheezes or rales.  Normal effort without use of accessory muscles.  Cardiovascular - Heart has regular rhythm and rate.  No murmurs, rubs or gallops.    + pedal pulses and no varicosities.      Abdominal -  Soft, nontender, nondistended.  Bowel sounds are intact.   Extremities - No clubbing, cyanosis, or  edema.   Musculoskeletal -  No clubbing .  No Osler's nodes.   Gait normal .

## 2024-08-29 NOTE — PATIENT INSTRUCTIONS
ICD will send in 3 mos    Maintain good blood pressure control-goal<130/80 at rest  Maintain good cholesterol control LDL goal<70 with arterial disease  If you are diabetic work to keep/obtain hemoglobin A1c< 7    Follow up in 6 mos  With ICD check   Call with any questions or concerns  Follow up with Brian King MD for non cardiac problems  Report any new problems  Cardiovascular Fitness-Exercise as tolerated.  Strive for 30 minutes of exercise most days of the week.    Cardiac / Healthy Diet- Avoid processed high fat foods, maintain low sodium/salt   Continue current medications as directed  Continue plan of treatment  It is always recommended that you bring your medications bottles with you to each visit - this is for your safety!

## 2024-09-30 ENCOUNTER — TELEPHONE (OUTPATIENT)
Dept: UROLOGY | Facility: CLINIC | Age: 79
End: 2024-09-30

## 2024-09-30 NOTE — TELEPHONE ENCOUNTER
Caller: MODESTA MARCH    Relationship: SELF    Best call back number: [phone]     What orders are you requesting (i.e. lab or imaging): LAB  PSA    In what timeframe would the patient need to come in: 1/OCT    Where will you receive your lab/imaging services:     Additional notes: PT HAS APPT 10/11/24

## 2024-10-04 ENCOUNTER — LAB (OUTPATIENT)
Dept: LAB | Facility: HOSPITAL | Age: 79
End: 2024-10-04
Payer: MEDICARE

## 2024-10-04 DIAGNOSIS — C61 PROSTATE CANCER: ICD-10-CM

## 2024-10-04 LAB — PSA SERPL-MCNC: <0.014 NG/ML (ref 0–4)

## 2024-10-04 PROCEDURE — 36415 COLL VENOUS BLD VENIPUNCTURE: CPT

## 2024-10-04 PROCEDURE — 84153 ASSAY OF PSA TOTAL: CPT

## 2024-10-11 NOTE — PROGRESS NOTES
Subjective    Mr. Molina is 79 y.o. male    Chief Complaint: Prostate cancer    History of Present Illness  Patient presents with history of prostate cancer diagnosed October 2019.  Patient underwent robot-assisted laparoscopic prostatectomy.  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  He had some stress incontinence but this has resolved.  He has erectile dysfunction but does not interested in treating or discussing.  Recent PSA is <0.014.  Patient denies any weight loss bone pain or lower extremity edema  Lab Results   Component Value Date    PSA <0.014 10/04/2024    PSA <0.014 07/03/2023    PSA <0.014 03/14/2023       The following portions of the patient's history were reviewed and updated as appropriate: allergies, current medications, past family history, past medical history, past social history, past surgical history and problem list.    Review of Systems   Genitourinary: Negative.          Current Outpatient Medications:     aspirin 81 MG EC tablet, Take 1 tablet by mouth Daily. WILL STOP 11/2/18, Disp: , Rfl:     carvedilol (COREG) 3.125 MG tablet, Take 1 tablet by mouth 2 (Two) Times a Day With Meals., Disp: , Rfl:     isosorbide mononitrate (IMDUR) 30 MG 24 hr tablet, 1 tablet Daily., Disp: , Rfl:     omeprazole (priLOSEC) 40 MG capsule, Take 1 capsule by mouth Daily., Disp: 30 capsule, Rfl: 0    rosuvastatin (CRESTOR) 10 MG tablet, Take 1 tablet by mouth Daily., Disp: , Rfl:     traZODone (DESYREL) 50 MG tablet, TAKE 1 TABLET BY MOUTH NIGHTLY AS NEEDED, Disp: , Rfl:     nitroglycerin (NITROSTAT) 0.4 MG SL tablet, Place 1 tablet under the tongue Every 5 (Five) Minutes As Needed for Chest Pain. (Patient not taking: Reported on 10/17/2024), Disp: , Rfl:     Past Medical History:   Diagnosis Date    Arthritis     CHF (congestive heart failure)     dr crow     Childhood asthma     Colon polyp     Coronary artery disease     Dysuria     Elevated PSA      GERD (gastroesophageal reflux disease)     History of pulmonary embolism     yrs ago 1973    Hyperlipidemia     Myocardial infarction     Prostate cancer     Seborrheic keratosis 2019    Left [employer]     Shingles        Past Surgical History:   Procedure Laterality Date    APPENDECTOMY      CARDIAC DEFIBRILLATOR PLACEMENT      CHOLECYSTECTOMY      COLONOSCOPY      COLONOSCOPY N/A 10/20/2017    Procedure: COLONOSCOPY WITH ANESTHESIA;  Surgeon: Donny Mock MD;  Location: [facility] ENDOSCOPY;  Service:     COLONOSCOPY N/A 10/24/2022    Procedure: COLONOSCOPY WITH ANESTHESIA;  Surgeon: Donny Mock MD;  Location: [facility] ENDOSCOPY;  Service: Gastroenterology;  Laterality: N/A;  Pre: hx polyp, family hx colon ca  Post: normal  Josiah Martines MD    CORONARY ARTERY BYPASS GRAFT  2011    [facility]    ENDOSCOPY  2016    ENDOSCOPY N/A 10/20/2017    Procedure: ESOPHAGOGASTRODUODENOSCOPY WITH ANESTHESIA;  Surgeon: Donny Mock MD;  Location: [facility] ENDOSCOPY;  Service:     EXCISION LESION Left 2019    Left [employer]  Path- Inflamed pigmented seborrheic keratosis    HIP SURGERY Left     KNEE ARTHROSCOPY Right 2018    Procedure: KNEE ARTHROSCOPY WITH MENISCECTOMY;  Surgeon: Hernesto Rodriguez MD;  Location:  [facility] OR;  Service: Orthopedics    PACEMAKER IMPLANTATION      defibrillator also medtronic    PROSTATECTOMY Bilateral 10/22/2019    Procedure: RADICAL PROSTATECTOMY LAPAROSCOPIC WITH DAVINCI SI ROBOT;  Surgeon: Haim Warren MD;  Location: [facility] OR;  Service: DaVinci       Social History     Socioeconomic History    Marital status:    Tobacco Use    Smoking status: Former     Current packs/day: 0.00     Types: Cigarettes     Quit date: 10/20/1978     Years since quittin.0    Smokeless tobacco: Never   Vaping Use    Vaping status: Never Used   Substance and Sexual Activity    Alcohol use: No    Drug use: No    Sexual activity: Defer       Family  History   Problem Relation Age of Onset    Diabetes Mother     Heart disease Father     Colon cancer Father     Colon polyps Neg Hx        Objective    Temp 98.1 °F (36.7 °C)   Wt 76.2 kg (168 lb)   BMI 24.11 kg/m²     Physical Exam  Constitutional:       Appearance: Normal appearance.   HENT:      Head: Normocephalic and atraumatic.   Pulmonary:      Effort: Pulmonary effort is normal.   Neurological:      Mental Status: He is alert.   Psychiatric:         Mood and Affect: Mood normal.         Behavior: Behavior normal.             Results for orders placed or performed in visit on 10/17/24   POC Urinalysis Dipstick, Multipro    Collection Time: 10/17/24 10:27 AM    Specimen: Urine   Result Value Ref Range    Color Yellow Yellow, Straw, Dark Yellow, [place]    Clarity, UA Clear Clear    Glucose, UA Negative Negative mg/dL    Bilirubin Negative Negative    Ketones, UA Negative Negative    Specific Gravity  1.005 1.005 - 1.030    Blood, UA Trace (A) Negative    pH, Urine 6.0 5.0 - 8.0    Protein, POC Negative Negative mg/dL    Urobilinogen, UA 0.2 E.U./dL Normal, 0.2 E.U./dL    Nitrite, UA Negative Negative    Leukocytes Negative Negative     Assessment and Plan    Diagnoses and all orders for this visit:    1. Prostate cancer (Primary)  -     POC Urinalysis Dipstick, Multipro  -     PSA DIAGNOSTIC; Future        PSA remains undetectable less than 0.014 no new or worsening symptoms his urine is clear.    No longer has any stress incontinence.    Follow-up 1 year PSA prior.

## 2024-10-17 ENCOUNTER — OFFICE VISIT (OUTPATIENT)
Dept: UROLOGY | Facility: CLINIC | Age: 79
End: 2024-10-17
Payer: MEDICARE

## 2024-10-17 VITALS — TEMPERATURE: 98.1 F | WEIGHT: 168 LBS | BODY MASS INDEX: 24.11 KG/M2

## 2024-10-17 DIAGNOSIS — C61 PROSTATE CANCER: Primary | ICD-10-CM

## 2024-10-17 LAB
BILIRUB BLD-MCNC: NEGATIVE MG/DL
CLARITY, POC: CLEAR
COLOR UR: YELLOW
GLUCOSE UR STRIP-MCNC: NEGATIVE MG/DL
KETONES UR QL: NEGATIVE
LEUKOCYTE EST, POC: NEGATIVE
NITRITE UR-MCNC: NEGATIVE MG/ML
PH UR: 6 [PH] (ref 5–8)
PROT UR STRIP-MCNC: NEGATIVE MG/DL
RBC # UR STRIP: ABNORMAL /UL
SP GR UR: 1 (ref 1–1.03)
UROBILINOGEN UR QL: ABNORMAL

## 2024-12-03 DIAGNOSIS — Z95.810 AICD (AUTOMATIC CARDIOVERTER/DEFIBRILLATOR) PRESENT: Primary | ICD-10-CM

## 2024-12-03 DIAGNOSIS — I25.5 ISCHEMIC CARDIOMYOPATHY: ICD-10-CM

## 2025-01-16 DIAGNOSIS — I25.10 CORONARY ARTERY DISEASE INVOLVING NATIVE CORONARY ARTERY OF NATIVE HEART WITHOUT ANGINA PECTORIS: ICD-10-CM

## 2025-01-16 RX ORDER — NITROGLYCERIN 0.4 MG/1
TABLET SUBLINGUAL
Qty: 25 TABLET | Refills: 1 | Status: SHIPPED | OUTPATIENT
Start: 2025-01-16

## 2025-01-16 RX ORDER — ISOSORBIDE MONONITRATE 30 MG/1
TABLET, EXTENDED RELEASE ORAL
Qty: 90 TABLET | Refills: 1 | Status: SHIPPED | OUTPATIENT
Start: 2025-01-16

## 2025-02-28 ENCOUNTER — OFFICE VISIT (OUTPATIENT)
Dept: CARDIOLOGY CLINIC | Age: 80
End: 2025-02-28

## 2025-02-28 VITALS
HEART RATE: 64 BPM | BODY MASS INDEX: 23.52 KG/M2 | HEIGHT: 71 IN | WEIGHT: 168 LBS | SYSTOLIC BLOOD PRESSURE: 132 MMHG | DIASTOLIC BLOOD PRESSURE: 62 MMHG

## 2025-02-28 DIAGNOSIS — I25.10 CORONARY ARTERY DISEASE INVOLVING NATIVE CORONARY ARTERY OF NATIVE HEART WITHOUT ANGINA PECTORIS: Primary | ICD-10-CM

## 2025-02-28 DIAGNOSIS — I25.5 ISCHEMIC CARDIOMYOPATHY: ICD-10-CM

## 2025-02-28 DIAGNOSIS — I10 ESSENTIAL HYPERTENSION: ICD-10-CM

## 2025-02-28 DIAGNOSIS — Z95.810 AICD (AUTOMATIC CARDIOVERTER/DEFIBRILLATOR) PRESENT: ICD-10-CM

## 2025-02-28 NOTE — PROGRESS NOTES
[facility] Cardiology  [address] [address]  Phone: [phone]  Fax: [phone]    OFFICE VISIT:  2025    Felix Pimentel - : 1945    Reason For Visit:  Felix is a 79 y.o. male who is here for Follow-up (No cardiac symptoms) and Coronary artery disease involving native coronary artery of  CABG ×4 in -nuclear stress test 16 showed old inferior infarction with no new ischemia with EF 35%  EF 20%. AICD placement   With generator replacement 21    Routine follow-up in February with complaints of 1 episode of chest discomfort.  Underwent nuclear stress test that showed stable ejection fraction and no evidence of any new ischemia.     He returns today in follow-up.  States overall he is active and doing well  He is planning on going to [place] in  and taking his family.  He will be driving      Subjective  Felix denies exertional chest pain, shortness of breath, orthopnea, paroxysmal nocturnal dyspnea, syncope, presyncope, arrhythmia, edema and fatigue.  The patient denies numbness or weakness to suggest cerebrovascular accident or transient ischemic attack.    Brian King MD is PCP and follows labs.  Felix Pimentel has the following history as recorded in [facility]:    Patient Active Problem List    Diagnosis Date Noted    ICD (implantable cardioverter-defibrillator) battery depletion     Hx of CABG 10/03/2019    Pancreatitis, unspecified pancreatitis type 2020    Nausea & vomiting 2020    Idiopathic pancreatitis 2016    AICD (automatic cardioverter/defibrillator) present     Ischemic cardiomyopathy     Hyperlipidemia 2011    HTN (hypertension) 2011    CAD (coronary artery disease)      Past Medical History:   Diagnosis Date    Additional heart attack (inferior wall)     AICD (automatic cardioverter/defibrillator) present 13  MDL    Arthritis     CAD (coronary artery disease)     SEVERE MULTIVESSEL    Hx of blood

## 2025-06-03 ENCOUNTER — RESULTS FOLLOW-UP (OUTPATIENT)
Dept: CARDIOLOGY CLINIC | Age: 80
End: 2025-06-03

## 2025-06-03 DIAGNOSIS — I25.10 CORONARY ARTERY DISEASE INVOLVING NATIVE CORONARY ARTERY OF NATIVE HEART WITHOUT ANGINA PECTORIS: ICD-10-CM

## 2025-06-03 DIAGNOSIS — Z95.810 AICD (AUTOMATIC CARDIOVERTER/DEFIBRILLATOR) PRESENT: Primary | ICD-10-CM

## 2025-06-03 DIAGNOSIS — I25.5 ISCHEMIC CARDIOMYOPATHY: ICD-10-CM

## 2025-08-28 ENCOUNTER — OFFICE VISIT (OUTPATIENT)
Dept: CARDIOLOGY CLINIC | Age: 80
End: 2025-08-28

## 2025-08-28 VITALS
BODY MASS INDEX: 23.91 KG/M2 | DIASTOLIC BLOOD PRESSURE: 80 MMHG | OXYGEN SATURATION: 97 % | HEIGHT: 70 IN | WEIGHT: 167 LBS | HEART RATE: 69 BPM | SYSTOLIC BLOOD PRESSURE: 138 MMHG

## 2025-08-28 DIAGNOSIS — I25.10 CORONARY ARTERY DISEASE INVOLVING NATIVE CORONARY ARTERY OF NATIVE HEART WITHOUT ANGINA PECTORIS: Primary | ICD-10-CM

## 2025-08-28 DIAGNOSIS — I25.5 ISCHEMIC CARDIOMYOPATHY: ICD-10-CM

## 2025-08-28 DIAGNOSIS — I10 ESSENTIAL HYPERTENSION: ICD-10-CM

## 2025-08-28 DIAGNOSIS — Z95.810 AICD (AUTOMATIC CARDIOVERTER/DEFIBRILLATOR) PRESENT: ICD-10-CM

## (undated) DEVICE — SUT GUT CHRM 3/0 SH 27IN G122H

## (undated) DEVICE — DAVINCI: Brand: MEDLINE INDUSTRIES, INC.

## (undated) DEVICE — ENDOGATOR AUXILIARY WATER JET CONNECTOR: Brand: ENDOGATOR

## (undated) DEVICE — TRY PREP SCRB VAG PVP

## (undated) DEVICE — CATHETER,FOLEY,SILI-ELAST,LTX,20FR,10ML: Brand: MEDLINE

## (undated) DEVICE — SUCTION MAT (LOW PROFILE), 50X34: Brand: NEPTUNE

## (undated) DEVICE — THE CHANNEL CLEANING BRUSH IS A NYLON FLEXI BRUSH ATTACHED TO A FLEXIBLE PLASTIC SHEATH DESIGNED TO SAFELY REMOVE DEBRIS FROM FLEXIBLE ENDOSCOPES.

## (undated) DEVICE — TIP COVER ACCESSORY

## (undated) DEVICE — SUT SILK 2/0 FS BLK 18IN 685G

## (undated) DEVICE — SUT VIC 0 UR6 27IN VCP603H

## (undated) DEVICE — CVR BRD ARM 13X30

## (undated) DEVICE — SUT ETHLN 4/0 FS2 18IN 662H

## (undated) DEVICE — DRSNG WND GZ CURAD OIL EMULSION 3X3IN STRL

## (undated) DEVICE — GLV SURG TRIUMPH GREEN W/ALOE PF LTX 8 STRL

## (undated) DEVICE — CUFF,BP,DISP,1 TUBE,ADULT,HP: Brand: MEDLINE

## (undated) DEVICE — [TOMCAT CUTTER, ARTHROSCOPIC SHAVER BLADE,  DO NOT RESTERILIZE,  DO NOT USE IF PACKAGE IS DAMAGED,  KEEP DRY,  KEEP AWAY FROM SUNLIGHT]: Brand: FORMULA

## (undated) DEVICE — GLV SURG BIOGEL LTX PF 7 1/2

## (undated) DEVICE — DISPOSABLE TOURNIQUET CUFF SINGLE BLADDER, SINGLE PORT AND QUICK CONNECT CONNECTOR: Brand: COLOR CUFF

## (undated) DEVICE — 2, DISPOSABLE SUCTION/IRRIGATOR WITHOUT DISPOSABLE TIP: Brand: STRYKEFLOW

## (undated) DEVICE — PK TURNOVER RM ADV

## (undated) DEVICE — TROC ANCHORPORT BLADELES W/GRIP 12X120MM

## (undated) DEVICE — COTTON UNDERCAST PADDING,REGULAR FINISH: Brand: WEBRIL

## (undated) DEVICE — MONOPOLAR METZENBAUM SCISSOR, MINI BLADE TIP, DISPOSABLE: Brand: MONOPOLAR METZENBAUM SCISSOR, MINI BLADE TIP, DISPOSABLE

## (undated) DEVICE — ANTIBACTERIAL UNDYED BRAIDED (POLYGLACTIN 910), SYNTHETIC ABSORBABLE SUTURE: Brand: COATED VICRYL

## (undated) DEVICE — MSK O2 MD CONCENTR A/ LF 7FT 1P/U

## (undated) DEVICE — APPL HEMO SURG ARISTA/AH/FLEXITIP XL 38CM

## (undated) DEVICE — ST TBG AIRSEAL FLTR TRI LUM

## (undated) DEVICE — SUT VIC 1 SUTUPAK TIES 18IN J913G

## (undated) DEVICE — SNAR POLYP SENSATION MICRO OVL 13 240X40

## (undated) DEVICE — SKIN AFFIX SURG ADHESIVE 72/CS 0.55ML: Brand: MEDLINE

## (undated) DEVICE — VIOLET POLYDIOXANONE POLYMER, SYNTHETIC ABSORBABLE SUTURE CLIPS: Brand: LAPRA-TY

## (undated) DEVICE — SYS CLOSE PORTII CARTR/THOMASN XL

## (undated) DEVICE — SENSR O2 OXIMAX FNGR A/ 18IN NONSTR

## (undated) DEVICE — DRN JP RND NO TROC SIL 15F 3/16IN

## (undated) DEVICE — YANKAUER,BULB TIP WITH VENT: Brand: ARGYLE

## (undated) DEVICE — PK KN ARTHSCP 30

## (undated) DEVICE — ENDOPATH XCEL WITH OPTIVIEW TECHNOLOGY BLADELESS TROCARS WITH STABILITY SLEEVES: Brand: ENDOPATH XCEL OPTIVIEW

## (undated) DEVICE — OBT BLADLES ENDOWRIST DAVINCI/S 8MM

## (undated) DEVICE — DRP CASSET 10 RAY LG 24X40

## (undated) DEVICE — ENDOPOUCH RETRIEVER SPECIMEN RETRIEVAL BAGS: Brand: ENDOPOUCH RETRIEVER

## (undated) DEVICE — TBG SMPL FLTR LINE NASL 02/C02 A/ BX/100

## (undated) DEVICE — CONMED SCOPE SAVER BITE BLOCK, 20X27 MM: Brand: SCOPE SAVER

## (undated) DEVICE — Device: Brand: DEFENDO AIR/WATER/SUCTION AND BIOPSY VALVE

## (undated) DEVICE — RESERVOIR,SUCTION,100CC,SILICONE: Brand: MEDLINE

## (undated) DEVICE — TBG ARTHRO FLOWSTEADY/ST DISP

## (undated) DEVICE — MASK,OXYGEN,MED CONC,ADLT,7' TUB, UC: Brand: PENDING

## (undated) DEVICE — POSTN SURG LEG WELL LOW EXTREM 1P/U

## (undated) DEVICE — THE SINGLE USE ETRAP – POLYP TRAP IS USED FOR SUCTION RETRIEVAL OF ENDOSCOPICALLY REMOVED POLYPS.: Brand: ETRAP

## (undated) DEVICE — 4-PORT MANIFOLD: Brand: NEPTUNE 2

## (undated) DEVICE — FRCP BX RADJAW4 NDL 2.8 240 STD OG

## (undated) DEVICE — GLV SURG TRIUMPH PF LTX 7.5 STRL

## (undated) DEVICE — GLV SURG TRIUMPH MICRO PF LTX 7.5 STRL

## (undated) DEVICE — Device